# Patient Record
Sex: FEMALE | Race: WHITE | NOT HISPANIC OR LATINO | URBAN - METROPOLITAN AREA
[De-identification: names, ages, dates, MRNs, and addresses within clinical notes are randomized per-mention and may not be internally consistent; named-entity substitution may affect disease eponyms.]

---

## 2018-04-01 PROBLEM — I34.1 MVP (MITRAL VALVE PROLAPSE): Status: ACTIVE | Noted: 2018-04-01

## 2018-04-01 PROBLEM — R91.1 PULMONARY NODULE: Status: ACTIVE | Noted: 2018-04-01

## 2018-04-01 PROBLEM — I77.810 DILATED AORTIC ROOT (CMS/HCC): Status: ACTIVE | Noted: 2018-04-01

## 2018-04-01 PROBLEM — I25.10 CORONARY ARTERY DISEASE INVOLVING NATIVE CORONARY ARTERY OF NATIVE HEART WITHOUT ANGINA PECTORIS: Status: ACTIVE | Noted: 2018-04-01

## 2018-04-01 PROBLEM — I47.10 SVT (SUPRAVENTRICULAR TACHYCARDIA) (CMS/HCC): Status: ACTIVE | Noted: 2018-04-01

## 2018-04-01 PROBLEM — I10 ESSENTIAL HYPERTENSION: Status: ACTIVE | Noted: 2018-04-01

## 2018-04-01 PROBLEM — I45.10 RBBB: Status: ACTIVE | Noted: 2018-04-01

## 2018-04-01 PROBLEM — E78.2 MIXED HYPERLIPIDEMIA: Status: ACTIVE | Noted: 2018-04-01

## 2018-04-06 ENCOUNTER — TELEPHONE (OUTPATIENT)
Dept: CARDIOLOGY | Facility: CLINIC | Age: 83
End: 2018-04-06

## 2018-04-06 RX ORDER — ADHESIVE BANDAGE
30 BANDAGE TOPICAL DAILY PRN
COMMUNITY
Start: 2014-09-29 | End: 2019-10-14

## 2018-04-06 RX ORDER — SOY PROTEIN
1 POWDER (GRAM) ORAL DAILY
COMMUNITY
Start: 2011-06-06

## 2018-04-06 RX ORDER — MAGNESIUM HYDROXIDE 400 MG/5ML
1 SUSPENSION, ORAL (FINAL DOSE FORM) ORAL DAILY
COMMUNITY
Start: 2011-06-06

## 2018-04-06 RX ORDER — THIAMINE HCL 50 MG
50 TABLET ORAL DAILY
COMMUNITY
Start: 2016-09-26

## 2018-04-06 RX ORDER — LORAZEPAM 0.5 MG/1
0.5 TABLET ORAL NIGHTLY PRN
COMMUNITY
Start: 2011-06-06

## 2018-04-06 RX ORDER — SIMETHICONE 125 MG
125 CAPSULE ORAL DAILY
COMMUNITY
Start: 2011-06-06

## 2018-04-06 RX ORDER — CHOLECALCIFEROL (VITAMIN D3) 25 MCG
1 TABLET ORAL DAILY
COMMUNITY
Start: 2011-06-06

## 2018-04-06 RX ORDER — LANSOPRAZOLE 30 MG/1
30 TABLET, ORALLY DISINTEGRATING, DELAYED RELEASE ORAL DAILY
COMMUNITY
Start: 2011-06-06

## 2018-04-06 RX ORDER — FOLIC ACID 0.4 MG
1 TABLET ORAL DAILY
COMMUNITY
Start: 2012-02-06

## 2018-04-06 RX ORDER — FLAXSEED OIL 1000 MG
1 CAPSULE ORAL AS NEEDED
COMMUNITY
Start: 2016-09-26

## 2018-04-06 RX ORDER — VITAMIN E 268 MG
1 CAPSULE ORAL DAILY
COMMUNITY
Start: 2011-06-06 | End: 2022-04-04

## 2018-04-06 NOTE — PROGRESS NOTES
Cardiology Note        Kathy Clayton is a 84 y.o. female 5/24/1933      She has a history of SVT with ablation 2006, of mitral valve prolapse and coronary artery disease.  CAD was identified on calcifications on CAT scan her last ischemic evaluation was negative in September 2016 with pharmacologic nuclear she is pulmonary nodules stable for several years    She has hyperlipidemia but is intolerant to the medications and cannot afford the newer injectable drugs    She has a mildly dilated aortic root last echocardiogram October 2017 was 4.2 she had CT angiogram thoracic aorta done in March 2017 aortic root was only 3.9 nodules are described as stable from 2012    echoCardiogram today stable aortic root 4.2 significant mitral annular calcification with mild mitral regurgitation normal left ventricular systolic function mild aortic insufficiency    Her main complaint is significant arthritis she goes to physical therapy        Patient Active Problem List    Diagnosis Date Noted   • Arthritis 04/09/2018   • Mixed hyperlipidemia 04/01/2018   • Essential hypertension 04/01/2018   • Coronary artery disease involving native coronary artery of native heart without angina pectoris 04/01/2018   • Dilated aortic root (CMS/HCC) ([facility]) 04/01/2018   • SVT (supraventricular tachycardia) (CMS/HCC) ([facility]) 04/01/2018   • Pulmonary nodule 04/01/2018   • RBBB 04/01/2018   • MVP (mitral valve prolapse) 04/01/2018       Allergy    Adenosine; Anesthesia s/i-40  [propofol]; Cephalosporins; Diltiazem; Ezetimibe; Fluoxetine hcl; Metoprolol; Penicillins; Plant stanol leonila; Statins-hmg-coa reductase inhibitors; and Sulfa (sulfonamide antibiotics)          MED LIST       Current Outpatient Prescriptions   Medication Sig Dispense Refill   • cholecalciferol, vitamin D3, (cholecalciferol) 1,000 unit tablet Take 1 tablet by mouth daily.     • CYANOCOBALAMIN/FOLIC ACID (VITAMIN B12-FOLIC ACID) 500-400 mcg tablet Take 1 tablet by mouth daily.      • flaxseed oil 1,000 mg capsule Take 1 capsule by mouth daily.     • folic acid (FOLVITE) 400 mcg tablet Take 1 tablet by mouth daily.     • Lactobac no.41-Bifidobact no.7 (PROBIOTIC-10) 70 mg (3 billion cell) capsule Take 1 capsule by mouth daily.     • lansoprazole (PREVACID SOLUTAB) 30 mg disintegrating tablet Take 30 mg by mouth daily.     • LORazepam (ATIVAN) 0.5 mg tablet Take 0.5 mg by mouth nightly as needed for anxiety.     • magnesium hydroxide (magnesium hydroxide) 400 mg/5 mL suspension Take 30 mL by mouth daily as needed.     • multivit-min-FA-lycopen-lutein (CENTRUM SILVER) tablet Take 1 tablet by mouth daily.     • polyvinyl alcohol-povidon,PF, (REFRESH CLASSIC, PF,) 1.4-0.6 % dropperette Take 1 drop by mouth 4 (four) times a day as needed.     • potassium gluconate 595 mg (99 mg) tablet Take 1 tablet by mouth daily.     • simethicone (GAS RELIEF) 125 mg capsule Take 125 mg by mouth daily.     • thiamine (vitamin B-1) 50 mg tablet Take 50 mg by mouth daily.     • vitamin E 400 unit capsule Take 1 tablet by mouth daily.       No current facility-administered medications for this visit.                 Review of Systems   Constitution: Negative for malaise/fatigue, weight gain and weight loss.   HENT: Negative for hearing loss and hoarse voice.    Eyes: Negative for visual disturbance.   Cardiovascular: Negative for chest pain, claudication, cyanosis, dyspnea on exertion, irregular heartbeat, leg swelling, near-syncope, orthopnea, palpitations, paroxysmal nocturnal dyspnea and syncope.   Respiratory: Negative for cough, hemoptysis, shortness of breath, sleep disturbances due to breathing, snoring, sputum production and wheezing.    Endocrine: Negative for cold intolerance and heat intolerance.   Hematologic/Lymphatic: Negative.  Negative for bleeding problem. Does not bruise/bleed easily.   Skin: Negative.  Negative for rash.   Musculoskeletal: Positive for joint pain. Negative for arthritis, falls,  muscle cramps and myalgias.   Gastrointestinal: Negative for abdominal pain, anorexia, change in bowel habit, constipation, diarrhea, dysphagia, heartburn, jaundice and nausea.   Genitourinary: Negative for frequency and nocturia.   Neurological: Negative for dizziness, focal weakness, headaches, light-headedness, numbness, tremors and vertigo.   Psychiatric/Behavioral: Negative for memory loss. The patient does not have insomnia and is not nervous/anxious.    Allergic/Immunologic: Negative for hives.       Labs     Lab creatinine 0.8 potassium 4 5  Cholesterol 251  HDL 53  Hemoglobin 11.8      No results found for: WBC, HGB, HCT, PLT, CHOL, TRIG, HDL, LDLDIRECT, TOTLDLC, LDLCALC, ALT, AST, NA, K, CL, CREATININE, BUN, CO2, TSH, INR, GLUF, HGBA1C, BNP    No results found for: GLUCOSE, CALCIUM, NA, K, CO2, CL, BUN, CREATININE      Objective   Vitals:    04/09/18 0948   BP: 124/70   Pulse: 68   SpO2: 98%     Physical Exam   Constitutional: She is oriented to person, place, and time. She appears well-developed and well-nourished. No distress.   HENT:   Head: Normocephalic and atraumatic.   Nose: Nose normal.   Eyes: Conjunctivae are normal. No scleral icterus.   Neck: No JVD present.   Cardiovascular: Normal rate, regular rhythm, normal heart sounds and intact distal pulses.  Exam reveals no gallop and no friction rub.    No murmur heard.  2/6 systolic murmur   Pulmonary/Chest: Effort normal. No stridor. No respiratory distress. She has no wheezes. She has no rales. She exhibits no tenderness.   Abdominal: There is no tenderness.   Musculoskeletal: She exhibits no edema or deformity.   Uses a cane   Neurological: She is alert and oriented to person, place, and time.   Skin: Skin is warm and dry.   Psychiatric: She has a normal mood and affect.         Imaging  Calcifications seen on cat scan 2016    pharm nuc neg 9/16    Echo 4/18 Mac mild mr aortic root 4.2 aort sclerosis mild ai no change 2017    CT angiogram  of  thoracic aorta 3.9pulm nodules LLL  3/17 stable from 2012      EKG nsr IRBBB no change      Assessment/Plan:      Coronary artery disease involving native coronary artery of native heart without angina pectoris  This was diagnosed by calcifications seen on CAT scan in the past last ischemic evaluation, pharmacologic nuclear stress test negative in 2016 normal LV systolic function with echo today.  Probably will not  repeat ischemic evaluation unless change in EKG symptoms or if any preoperative evaluation is required she is entertaining the possibility of knee replacement    SVT (supraventricular tachycardia) (CMS/HCC) ([facility])  Ablated 2016 no clinical recurrence no cardiac meds    Mixed hyperlipidemia  Unfixable.  Iwe have gone through all medications and her plan did not cover the PCS canine inhibitors  LDL cholesterol months 160    Dilated aortic root (CMS/HCC) ([facility])  Echo today stable aortic root 4.1 mitral annular calcification mild mitral regurgitation mild aortic regurgitation preserved ejection fraction  CAT scan last year aortic root was 4 pulmonary nodule stable from 2012.  We will repeat noncontrast CAT scan prior to her next visit we will repeat echocardiogram in 1 year    Pulmonary nodule  Left lower lobe pulmonary nodules that have been stable since 2012 she had pulmonary evaluation in the past repeat CAT scan prior to next visit to evaluate her aortic root    MVP (mitral valve prolapse)  Echo today extensive mitral annular calcification with prolapse mild mitral regurgitation mild aortic insufficiency aortic root 4.2 stable    Arthritis  This is her main medical complaints she does go to physical therapy at some point may need knee replacement she is trying to put it off.  If she undergoes orthopedic surgery will repeat pharmacologic nuclear stress test         Her back in 6 months.  She will have lab work noncontrast CAT scan of her aorta prior to that visit, an echocardiogram will be repeated in 1  year.  We will repeat ischemic evaluation unless she has suspicious symptoms change in her EKG or needs a preoperative evaluation    Thank you for allowing me to participate in the care of this patient.  I hope this information is helpful.      This letter was generated using speech recognition software.  Please excuse any typographical errors.  Matias Willard MD [facility]   4/9/2018

## 2018-04-09 ENCOUNTER — OFFICE VISIT (OUTPATIENT)
Dept: CARDIOLOGY | Facility: CLINIC | Age: 83
End: 2018-04-09
Attending: INTERNAL MEDICINE
Payer: MEDICARE

## 2018-04-09 ENCOUNTER — HOSPITAL ENCOUNTER (OUTPATIENT)
Dept: CARDIOLOGY | Facility: CLINIC | Age: 83
Discharge: HOME | End: 2018-04-09
Payer: MEDICARE

## 2018-04-09 VITALS
BODY MASS INDEX: 37.21 KG/M2 | DIASTOLIC BLOOD PRESSURE: 85 MMHG | WEIGHT: 210 LBS | SYSTOLIC BLOOD PRESSURE: 135 MMHG | HEIGHT: 63 IN

## 2018-04-09 VITALS
SYSTOLIC BLOOD PRESSURE: 124 MMHG | BODY MASS INDEX: 34.55 KG/M2 | OXYGEN SATURATION: 98 % | HEART RATE: 68 BPM | DIASTOLIC BLOOD PRESSURE: 70 MMHG | WEIGHT: 195 LBS | HEIGHT: 63 IN

## 2018-04-09 DIAGNOSIS — I25.10 CORONARY ARTERY DISEASE INVOLVING NATIVE CORONARY ARTERY OF NATIVE HEART WITHOUT ANGINA PECTORIS: ICD-10-CM

## 2018-04-09 DIAGNOSIS — Z00.00 ROUTINE GENERAL MEDICAL EXAMINATION AT A HEALTH CARE FACILITY: ICD-10-CM

## 2018-04-09 DIAGNOSIS — I10 ESSENTIAL HYPERTENSION: ICD-10-CM

## 2018-04-09 DIAGNOSIS — I34.1 MVP (MITRAL VALVE PROLAPSE): ICD-10-CM

## 2018-04-09 DIAGNOSIS — R91.1 PULMONARY NODULE: Primary | ICD-10-CM

## 2018-04-09 DIAGNOSIS — I47.10 SVT (SUPRAVENTRICULAR TACHYCARDIA) (CMS/HCC): ICD-10-CM

## 2018-04-09 DIAGNOSIS — I77.810 DILATED AORTIC ROOT (CMS/HCC): ICD-10-CM

## 2018-04-09 DIAGNOSIS — I45.10 RBBB: ICD-10-CM

## 2018-04-09 PROBLEM — M19.90 ARTHRITIS: Status: ACTIVE | Noted: 2018-04-09

## 2018-04-09 LAB
AORTIC ROOT ANNULUS: 4.1 CM
ASCENDING AORTA: 3.7 CM
AV PEAK GRADIENT: 10 MMHG
AV PEAK VELOCITY-S: 1.6 MM/S
AV VALVE AREA: 2.84 CM2
BSA FOR ECHO PROCEDURE: 2.06 M2
CUSP SEPARATION: 1.6 CM
E WAVE DECELERATION TIME: 204 MS
E/A RATIO: 1
E/E' RATIO: 18 MM/S
EF (A4C): 59.9 PERCENT
EF A2C: 68.8 PERCENT
EJECTION FRACTION: 63.9 PERCENT
EST RIGHT VENT SYSTOLIC PRESSURE BY TRICUSPID REGURGITATION JET: 40 MMHG
ESV (BP): 31.2 CM3
INTERVENTRICULAR SEPTUM: 0.9 CM
LA ESV (BP): 80.9 CM3
LA/AORTA RATIO: 0.85
LAAS-AP2: 25.5 CM2
LAAS-AP4: 23.6 CM2
LAD 2D: 3.5 CM
LALD A4C: 5.95 CM
LALD A4C: 6.13 CM
LEFT ATRIUM VOLUME INDEX: 41.26 ML/M2
LEFT ATRIUM VOLUME: 85 ML
LEFT INTERNAL DIMENSION IN SYSTOLE: 2.87 CM (ref 2.7–4.08)
LEFT VENTRICULAR INTERNAL DIMENSION IN DIASTOLE: 4.36 CM (ref 4.57–6.35)
LEFT VENTRICULAR POSTERIOR WALL IN END DIASTOLE: 0.9 CM (ref 0.6–1.11)
LV DIASTOLIC VOLUME: 86.5 CM3
LV ESV (APICAL 2 CHAMBER): 40.6 CM3
LVAD-AP2: 25.5 CM2
LVAD-AP4: 29.9 CM2
LVAS-AP2: 11.9 CM2
LVLD-AP2: 7.49 CM
LVLD-AP4: 7.28 CM
LVLS-AP2: 5.54 CM
LVLS-AP4: 5.97 CM
LVOT 2D: 2.2 CM
LVOT A: 3.8 CM2
LVOT PEAK VELOCITY: 1.2 MM/S
MITRAL VALVE PEAK A WAVE VELOCITY: 1.2 M/S
MV E'TISSUE VEL-LAT: 0.08 M/S
MV E'TISSUE VEL-MED: 0.07 M/S
MV PEAK A VEL: 1.2 MM/S
MV PEAK E VEL: 1.2 MM/S
RAP: 10 MMHG
RVOT VMAX: 852 MM/S
TR MAX PG: 30 MMHG
TRICUSPID VALVE PEAK REGURGITATION VELOCITY: 30 MM/S
Z-SCORE OF LEFT VENTRICULAR DIMENSION IN END DIASTOLE: -2.12
Z-SCORE OF LEFT VENTRICULAR DIMENSION IN END SYSTOLE: -1.15
Z-SCORE OF LEFT VENTRICULAR POSTERIOR WALL IN END DIASTOLE: 0.52

## 2018-04-09 PROCEDURE — 99214 OFFICE O/P EST MOD 30 MIN: CPT | Performed by: INTERNAL MEDICINE

## 2018-04-09 PROCEDURE — 93306 TTE W/DOPPLER COMPLETE: CPT | Performed by: INTERNAL MEDICINE

## 2018-04-09 PROCEDURE — 93000 ELECTROCARDIOGRAM COMPLETE: CPT | Performed by: INTERNAL MEDICINE

## 2018-04-09 ASSESSMENT — ENCOUNTER SYMPTOMS
JAUNDICE: 0
SNORING: 0
NEAR-SYNCOPE: 0
CONSTIPATION: 0
MYALGIAS: 0
MEMORY LOSS: 0
INSOMNIA: 0
FALLS: 0
HEMATOLOGIC/LYMPHATIC NEGATIVE: 1
LIGHT-HEADEDNESS: 0
DYSPNEA ON EXERTION: 0
ORTHOPNEA: 0
WEIGHT LOSS: 0
PND: 0
CLAUDICATION: 0
ANOREXIA: 0
SHORTNESS OF BREATH: 0
HEADACHES: 0
MUSCLE CRAMPS: 0
NERVOUS/ANXIOUS: 0
WEIGHT GAIN: 0
COUGH: 0
DIZZINESS: 0
CHANGE IN BOWEL HABIT: 0
ABDOMINAL PAIN: 0
HEARTBURN: 0
BRUISES/BLEEDS EASILY: 0
HOARSE VOICE: 0
FREQUENCY: 0
IRREGULAR HEARTBEAT: 0
NAUSEA: 0
SPUTUM PRODUCTION: 0
WHEEZING: 0
FOCAL WEAKNESS: 0
SYNCOPE: 0
PALPITATIONS: 0
VERTIGO: 0
DIARRHEA: 0
SLEEP DISTURBANCES DUE TO BREATHING: 0
HEMOPTYSIS: 0
TREMORS: 0
NUMBNESS: 0

## 2018-04-09 NOTE — ASSESSMENT & PLAN NOTE
Echo today stable aortic root 4.1 mitral annular calcification mild mitral regurgitation mild aortic regurgitation preserved ejection fraction  CAT scan last year aortic root was 4 pulmonary nodule stable from 2012.  We will repeat noncontrast CAT scan prior to her next visit we will repeat echocardiogram in 1 year

## 2018-04-09 NOTE — ASSESSMENT & PLAN NOTE
Echo today extensive mitral annular calcification with prolapse mild mitral regurgitation mild aortic insufficiency aortic root 4.2 stable

## 2018-04-09 NOTE — ASSESSMENT & PLAN NOTE
This was diagnosed by calcifications seen on CAT scan in the past last ischemic evaluation, pharmacologic nuclear stress test negative in 2016 normal LV systolic function with echo today.  Probably will not  repeat ischemic evaluation unless change in EKG symptoms or if any preoperative evaluation is required she is entertaining the possibility of knee replacement

## 2018-04-09 NOTE — LETTER
April 9, 2018     Arpan Casillas  [address]    [facility] [id number]    Patient: Kathy Clayton   YOB: 1933   Date of Visit: 4/9/2018       Dear Dr. Casillas:    Thank you for referring Kathy Clayton to me for evaluation. Below are my notes for this consultation.    If you have questions, please do not hesitate to call me. I look forward to following your patient along with you.         Sincerely,        Matias Willard MD        CC: No Recipients  Matias Willard MD  4/9/2018 10:15 AM  Sign at close encounter      Cardiology Note        Kathy Clayton is a 84 y.o. female 5/24/1933      She has a history of SVT with ablation 2006, of mitral valve prolapse and coronary artery disease.  CAD was identified on calcifications on CAT scan her last ischemic evaluation was negative in September 2016 with pharmacologic nuclear she is pulmonary nodules stable for several years    She has hyperlipidemia but is intolerant to the medications and cannot afford the newer injectable drugs    She has a mildly dilated aortic root last echocardiogram October 2017 was 4.2 she had CT angiogram thoracic aorta done in March 2017 aortic root was only 3.9 nodules are described as stable from 2012    echoCardiogram today stable aortic root 4.2 significant mitral annular calcification with mild mitral regurgitation normal left ventricular systolic function mild aortic insufficiency    Her main complaint is significant arthritis she goes to physical therapy        Patient Active Problem List    Diagnosis Date Noted   • Arthritis 04/09/2018   • Mixed hyperlipidemia 04/01/2018   • Essential hypertension 04/01/2018   • Coronary artery disease involving native coronary artery of native heart without angina pectoris 04/01/2018   • Dilated aortic root (CMS/HCC) ([facility]) 04/01/2018   • SVT (supraventricular tachycardia) (CMS/HCC) ([facility]) 04/01/2018   • Pulmonary nodule 04/01/2018   • RBBB 04/01/2018   • MVP  (mitral valve prolapse) 04/01/2018       Allergy    Adenosine; Anesthesia s/i-40  [propofol]; Cephalosporins; Diltiazem; Ezetimibe; Fluoxetine hcl; Metoprolol; Penicillins; Plant stanol leonila; Statins-hmg-coa reductase inhibitors; and Sulfa (sulfonamide antibiotics)          MED LIST       Current Outpatient Prescriptions   Medication Sig Dispense Refill   • cholecalciferol, vitamin D3, (cholecalciferol) 1,000 unit tablet Take 1 tablet by mouth daily.     • CYANOCOBALAMIN/FOLIC ACID (VITAMIN B12-FOLIC ACID) 500-400 mcg tablet Take 1 tablet by mouth daily.     • flaxseed oil 1,000 mg capsule Take 1 capsule by mouth daily.     • folic acid (FOLVITE) 400 mcg tablet Take 1 tablet by mouth daily.     • Lactobac no.41-Bifidobact no.7 (PROBIOTIC-10) 70 mg (3 billion cell) capsule Take 1 capsule by mouth daily.     • lansoprazole (PREVACID SOLUTAB) 30 mg disintegrating tablet Take 30 mg by mouth daily.     • LORazepam (ATIVAN) 0.5 mg tablet Take 0.5 mg by mouth nightly as needed for anxiety.     • magnesium hydroxide (magnesium hydroxide) 400 mg/5 mL suspension Take 30 mL by mouth daily as needed.     • multivit-min-FA-lycopen-lutein (CENTRUM SILVER) tablet Take 1 tablet by mouth daily.     • polyvinyl alcohol-povidon,PF, (REFRESH CLASSIC, PF,) 1.4-0.6 % dropperette Take 1 drop by mouth 4 (four) times a day as needed.     • potassium gluconate 595 mg (99 mg) tablet Take 1 tablet by mouth daily.     • simethicone (GAS RELIEF) 125 mg capsule Take 125 mg by mouth daily.     • thiamine (vitamin B-1) 50 mg tablet Take 50 mg by mouth daily.     • vitamin E 400 unit capsule Take 1 tablet by mouth daily.       No current facility-administered medications for this visit.                 Review of Systems   Constitution: Negative for malaise/fatigue, weight gain and weight loss.   HENT: Negative for hearing loss and hoarse voice.    Eyes: Negative for visual disturbance.   Cardiovascular: Negative for chest pain, claudication,  cyanosis, dyspnea on exertion, irregular heartbeat, leg swelling, near-syncope, orthopnea, palpitations, paroxysmal nocturnal dyspnea and syncope.   Respiratory: Negative for cough, hemoptysis, shortness of breath, sleep disturbances due to breathing, snoring, sputum production and wheezing.    Endocrine: Negative for cold intolerance and heat intolerance.   Hematologic/Lymphatic: Negative.  Negative for bleeding problem. Does not bruise/bleed easily.   Skin: Negative.  Negative for rash.   Musculoskeletal: Positive for joint pain. Negative for arthritis, falls, muscle cramps and myalgias.   Gastrointestinal: Negative for abdominal pain, anorexia, change in bowel habit, constipation, diarrhea, dysphagia, heartburn, jaundice and nausea.   Genitourinary: Negative for frequency and nocturia.   Neurological: Negative for dizziness, focal weakness, headaches, light-headedness, numbness, tremors and vertigo.   Psychiatric/Behavioral: Negative for memory loss. The patient does not have insomnia and is not nervous/anxious.    Allergic/Immunologic: Negative for hives.       Labs     Lab creatinine 0.8 potassium 4 5  Cholesterol 251  HDL 53  Hemoglobin 11.8      No results found for: WBC, HGB, HCT, PLT, CHOL, TRIG, HDL, LDLDIRECT, TOTLDLC, LDLCALC, ALT, AST, NA, K, CL, CREATININE, BUN, CO2, TSH, INR, GLUF, HGBA1C, BNP    No results found for: GLUCOSE, CALCIUM, NA, K, CO2, CL, BUN, CREATININE      Objective   Vitals:    04/09/18 0948   BP: 124/70   Pulse: 68   SpO2: 98%     Physical Exam   Constitutional: She is oriented to person, place, and time. She appears well-developed and well-nourished. No distress.   HENT:   Head: Normocephalic and atraumatic.   Nose: Nose normal.   Eyes: Conjunctivae are normal. No scleral icterus.   Neck: No JVD present.   Cardiovascular: Normal rate, regular rhythm, normal heart sounds and intact distal pulses.  Exam reveals no gallop and no friction rub.    No murmur heard.  2/6 systolic  murmur   Pulmonary/Chest: Effort normal. No stridor. No respiratory distress. She has no wheezes. She has no rales. She exhibits no tenderness.   Abdominal: There is no tenderness.   Musculoskeletal: She exhibits no edema or deformity.   Uses a cane   Neurological: She is alert and oriented to person, place, and time.   Skin: Skin is warm and dry.   Psychiatric: She has a normal mood and affect.         Imaging  Calcifications seen on cat scan 2016    pharm nuc neg 9/16    Echo 4/18 Mac mild mr aortic root 4.2 aort sclerosis mild ai no change 2017    CT angiogram of  thoracic aorta 3.9pulm nodules LLL  3/17 stable from 2012      EKG nsr IRBBB no change      Assessment/Plan:      Coronary artery disease involving native coronary artery of native heart without angina pectoris  This was diagnosed by calcifications seen on CAT scan in the past last ischemic evaluation, pharmacologic nuclear stress test negative in 2016 normal LV systolic function with echo today.  Probably will not  repeat ischemic evaluation unless change in EKG symptoms or if any preoperative evaluation is required she is entertaining the possibility of knee replacement    SVT (supraventricular tachycardia) (CMS/HCC) (HCC)  Ablated 2016 no clinical recurrence no cardiac meds    Mixed hyperlipidemia  Unfixable.  Iwe have gone through all medications and her plan did not cover the PCS canine inhibitors  LDL cholesterol months 160    Dilated aortic root (CMS/HCC) (HCC)  Echo today stable aortic root 4.1 mitral annular calcification mild mitral regurgitation mild aortic regurgitation preserved ejection fraction  CAT scan last year aortic root was 4 pulmonary nodule stable from 2012.  We will repeat noncontrast CAT scan prior to her next visit we will repeat echocardiogram in 1 year    Pulmonary nodule  Left lower lobe pulmonary nodules that have been stable since 2012 she had pulmonary evaluation in the past repeat CAT scan prior to next visit to evaluate  her aortic root    MVP (mitral valve prolapse)  Echo today extensive mitral annular calcification with prolapse mild mitral regurgitation mild aortic insufficiency aortic root 4.2 stable    Arthritis  This is her main medical complaints she does go to physical therapy at some point may need knee replacement she is trying to put it off.  If she undergoes orthopedic surgery will repeat pharmacologic nuclear stress test         Her back in 6 months.  She will have lab work noncontrast CAT scan of her aorta prior to that visit, an echocardiogram will be repeated in 1 year.  We will repeat ischemic evaluation unless she has suspicious symptoms change in her EKG or needs a preoperative evaluation    Thank you for allowing me to participate in the care of this patient.  I hope this information is helpful.      This letter was generated using speech recognition software.  Please excuse any typographical errors.  Matias Willard MD [facility]   4/9/2018

## 2018-04-09 NOTE — ASSESSMENT & PLAN NOTE
Unfixable.  Iwe have gone through all medications and her plan did not cover the PCS canine inhibitors  LDL cholesterol months 160

## 2018-04-09 NOTE — ASSESSMENT & PLAN NOTE
This is her main medical complaints she does go to physical therapy at some point may need knee replacement she is trying to put it off.  If she undergoes orthopedic surgery will repeat pharmacologic nuclear stress test

## 2018-09-28 DIAGNOSIS — I71.20 THORACIC AORTIC ANEURYSM, WITHOUT RUPTURE: Primary | ICD-10-CM

## 2018-10-02 NOTE — PROGRESS NOTES
Cardiology Note    Arpan Casillas Forrest is a 85 y.o. female 5/24/1933     She Returns for cardiac follow-up after noncontrast CAT scan for follow-up of dilated aortic root    CAT scan done earlier this month stable aortic root 4 thyroid nodule pulmonary nodule unchanged for several years  He has chronic GI issues and complained of an episode of GERD yesterday and lasted for hours no exertional component  She has coronary artery disease based on calcification seen on CAT scan last ischemic evaluation, pharmacologic nuclear stress test -2016  I told her if chest pain becomes a frequent issue or changes in quality we will repeat ischemic evaluation  She has mitral valve prolapse with mild mitral regurgitation mild aortic insufficiency and dilated aortic root as discussed above last echo April 2018  She had SVT ablated 2016    She had gerd yestersy lasts all day hx of gerd  Resolved ekg  No acute changes  If recurs to consider repeat pharm nuc hold off for now    She had some flashing in her eyes to she is coming back later this week as per ophthalmologic recommendation, for carotid duplex      Patient Active Problem List    Diagnosis Date Noted   • Visual changes 10/08/2018   • Arthritis 04/09/2018   • Mixed hyperlipidemia 04/01/2018   • Essential hypertension 04/01/2018   • Coronary artery disease involving native coronary artery of native heart without angina pectoris 04/01/2018   • Dilated aortic root (CMS/HCC) (HCC) 04/01/2018   • SVT (supraventricular tachycardia) (CMS/HCC) ([facility]) 04/01/2018   • Pulmonary nodule 04/01/2018   • RBBB 04/01/2018   • MVP (mitral valve prolapse) 04/01/2018       Allergy    Adenosine; Anesthesia s/i-40  [propofol]; Cephalosporins; Diltiazem; Ezetimibe; Fluoxetine hcl; Metoprolol; Penicillins; Plant stanol leonila; Statins-hmg-coa reductase inhibitors; and Sulfa (sulfonamide antibiotics)          MED LIST       Current Outpatient Prescriptions   Medication Sig  Dispense Refill   • cholecalciferol, vitamin D3, (cholecalciferol) 1,000 unit tablet Take 1 tablet by mouth daily.     • CYANOCOBALAMIN/FOLIC ACID (VITAMIN B12-FOLIC ACID) 500-400 mcg tablet Take 1 tablet by mouth daily.     • flaxseed oil 1,000 mg capsule Take 1 capsule by mouth daily.     • folic acid (FOLVITE) 400 mcg tablet Take 1 tablet by mouth daily.     • Lactobac no.41-Bifidobact no.7 (PROBIOTIC-10) 70 mg (3 billion cell) capsule Take 1 capsule by mouth daily.     • lansoprazole (PREVACID SOLUTAB) 30 mg disintegrating tablet Take 30 mg by mouth daily.     • LORazepam (ATIVAN) 0.5 mg tablet Take 0.5 mg by mouth nightly as needed for anxiety.     • magnesium hydroxide (magnesium hydroxide) 400 mg/5 mL suspension Take 30 mL by mouth daily as needed.     • multivit-min-FA-lycopen-lutein (CENTRUM SILVER) tablet Take 1 tablet by mouth daily.     • polyvinyl alcohol-povidon,PF, (REFRESH CLASSIC, PF,) 1.4-0.6 % dropperette Take 1 drop by mouth 4 (four) times a day as needed.     • potassium gluconate 595 mg (99 mg) tablet Take 1 tablet by mouth daily.     • simethicone (GAS RELIEF) 125 mg capsule Take 125 mg by mouth daily.     • thiamine (vitamin B-1) 50 mg tablet Take 50 mg by mouth daily.     • vitamin E 400 unit capsule Take 1 tablet by mouth daily.       No current facility-administered medications for this visit.                 Review of Systems   Constitution: Negative for malaise/fatigue, weight gain and weight loss.   HENT: Negative for hearing loss and hoarse voice.    Eyes: Negative for visual disturbance.   Cardiovascular: Negative for chest pain, claudication, cyanosis, dyspnea on exertion, irregular heartbeat, leg swelling, near-syncope, orthopnea, palpitations, paroxysmal nocturnal dyspnea and syncope.   Respiratory: Negative for cough, hemoptysis, shortness of breath, sleep disturbances due to breathing, snoring, sputum production and wheezing.    Endocrine: Negative for cold intolerance and heat  "intolerance.   Hematologic/Lymphatic: Negative.  Negative for bleeding problem. Does not bruise/bleed easily.   Skin: Negative.  Negative for rash.   Musculoskeletal: Positive for joint pain. Negative for arthritis, falls, muscle cramps and myalgias.   Gastrointestinal: Positive for heartburn. Negative for abdominal pain, anorexia, change in bowel habit, constipation, diarrhea, dysphagia, jaundice and nausea.   Genitourinary: Negative for frequency and nocturia.   Neurological: Negative for dizziness, focal weakness, headaches, light-headedness, numbness, tremors and vertigo.   Psychiatric/Behavioral: Negative for memory loss. The patient does not have insomnia and is not nervous/anxious.    Allergic/Immunologic: Negative for hives.       Labs   No results found for: WBC, HGB, HCT, PLT, CHOL, TRIG, HDL, LDLDIRECT, TOTLDLC, LDLCALC, ALT, AST, NA, K, CL, CREATININE, BUN, CO2, TSH, INR, HGBA1C, BNP    No results found for: GLUCOSE, CALCIUM, NA, K, CO2, CL, BUN, CREATININE      Objective   Vitals:    10/08/18 0830   BP: 134/82   BP Location: Left upper arm   Patient Position: Sitting   Pulse: 71   SpO2: 94%   Weight: 95.8 kg (211 lb 3.2 oz)   Height: 1.6 m (5' 3\")     Physical Exam   Constitutional: She is oriented to person, place, and time. She appears well-developed and well-nourished. No distress.   HENT:   Head: Normocephalic and atraumatic.   Nose: Nose normal.   Eyes: Conjunctivae are normal. No scleral icterus.   Neck: No JVD present.   Cardiovascular: Normal rate, regular rhythm, normal heart sounds and intact distal pulses.  Exam reveals no gallop and no friction rub.    No murmur heard.  2/6 systolic murmur   Pulmonary/Chest: Effort normal. No stridor. No respiratory distress. She has no wheezes. She has no rales. She exhibits no tenderness.   Abdominal: There is no tenderness.   Musculoskeletal: She exhibits no edema or deformity.   Uses a cane   Neurological: She is alert and oriented to person, place, and " time.   Skin: Skin is warm and dry.   Psychiatric: She has a normal mood and affect.         Cardiac Procedures    Imaging  CAT SCAN    CTA 3/17 AOR ROOT 5.0 PULM NODULES NO [id number]    CTA 3/17 aorga 3.9 lll pulm nodules stable from 201`12    CAT scan October 2018 aortic root 4  Thyroid nodule  Right upper lobe tubular nodule  No change 2016    STRESS  pharm nuc neg 9/16       ECHO  Echo 4/18 Mac mild mr aortic root 4.2 aort sclerosis mild ai no change 2017          EKG non spec st t top normla qt interval        assessment/Plan:    She had some unusual flashes in her eyes she will have carotid duplex done later this week and follows up with Dr. Lennie Cordero  I asked her to call me for the test to go over the results  I will see her back in 6 months with resting echocardiogram and lab work  She had a long issue with GERD symptoms return or change in quality we will repeat ischemic evaluation sounds like her typical indigestion  Due for noncontrast CAT scan of her aorta follow-up on aortic root, in 1 year                  Dilated aortic root (CMS/HCC) (HCC)  CAT scan stable for years subcentimeter pulmonary nodule stable for years CAT scan showed aortic root 4.0    Coronary artery disease involving native coronary artery of native heart without angina pectoris  This was diagnosed by calcifications seen on CAT scan in the past last stress test pharmacologic nuclear stress test -2016  Gets constant bouts of indigestion no real change will hold off on repeat testing no change in EKG or change in quality of chest pain    MVP (mitral valve prolapse)  Last seen on echo October 2017 MAC mild MR aortic root 4.2 stable  Repeat echo with next visit    SVT (supraventricular tachycardia) (CMS/HCC) (HCC)  Ablated 2006    Pulmonary nodule  Left lower lobe stable since 2012  Seen on recent CAT scan for follow-up of mildly dilated aortic root    Mixed hyperlipidemia  Cannot tolerate any therapies her insurance plan did not  cover the injectable PCSK9 monoclonal antibodies    Visual changes  She was having some flashing lights in her eyes was evaluated by Dr. Lennie Cordero  She will be having duplex of her carotids done in the next week    She is having carotid duplex in the next week I asked her to call me to go over the results   I will see her back in 6 months with echo and lab work she knows if there is any change in the quality of her chest pain let me know and I will repeat ischemic evaluation    Re                          Matias Willard MD [facility]   10/8/2018  Arpan Sethi

## 2018-10-03 ENCOUNTER — TELEPHONE (OUTPATIENT)
Dept: CARDIOLOGY | Facility: CLINIC | Age: 83
End: 2018-10-03

## 2018-10-03 NOTE — TELEPHONE ENCOUNTER
LMOM regarding cat scan result of her aorta is stable. Informed pt to call back with further questions if needed.

## 2018-10-05 ENCOUNTER — TRANSCRIBE ORDERS (OUTPATIENT)
Dept: CARDIOLOGY | Facility: CLINIC | Age: 83
End: 2018-10-05

## 2018-10-05 DIAGNOSIS — R09.89 CAROTID ARTERY BRUIT: Primary | ICD-10-CM

## 2018-10-08 ENCOUNTER — OFFICE VISIT (OUTPATIENT)
Dept: CARDIOLOGY | Facility: CLINIC | Age: 83
End: 2018-10-08
Payer: MEDICARE

## 2018-10-08 VITALS
BODY MASS INDEX: 37.42 KG/M2 | WEIGHT: 211.2 LBS | OXYGEN SATURATION: 94 % | HEART RATE: 71 BPM | HEIGHT: 63 IN | SYSTOLIC BLOOD PRESSURE: 134 MMHG | DIASTOLIC BLOOD PRESSURE: 82 MMHG

## 2018-10-08 DIAGNOSIS — I77.810 DILATED AORTIC ROOT (CMS/HCC): ICD-10-CM

## 2018-10-08 DIAGNOSIS — I10 ESSENTIAL HYPERTENSION: Primary | ICD-10-CM

## 2018-10-08 DIAGNOSIS — I25.10 CORONARY ARTERY DISEASE INVOLVING NATIVE CORONARY ARTERY OF NATIVE HEART WITHOUT ANGINA PECTORIS: ICD-10-CM

## 2018-10-08 DIAGNOSIS — I47.10 SVT (SUPRAVENTRICULAR TACHYCARDIA) (CMS/HCC): ICD-10-CM

## 2018-10-08 PROBLEM — H53.9 VISUAL CHANGES: Status: ACTIVE | Noted: 2018-10-08

## 2018-10-08 PROCEDURE — 99214 OFFICE O/P EST MOD 30 MIN: CPT | Performed by: INTERNAL MEDICINE

## 2018-10-08 PROCEDURE — 93000 ELECTROCARDIOGRAM COMPLETE: CPT | Performed by: INTERNAL MEDICINE

## 2018-10-08 ASSESSMENT — ENCOUNTER SYMPTOMS
SPUTUM PRODUCTION: 0
COUGH: 0
SHORTNESS OF BREATH: 0
HEMOPTYSIS: 0
PALPITATIONS: 0
HEADACHES: 0
CLAUDICATION: 0
WEIGHT GAIN: 0
CHANGE IN BOWEL HABIT: 0
DIZZINESS: 0
NERVOUS/ANXIOUS: 0
FALLS: 0
NEAR-SYNCOPE: 0
HEMATOLOGIC/LYMPHATIC NEGATIVE: 1
WEIGHT LOSS: 0
ABDOMINAL PAIN: 0
LIGHT-HEADEDNESS: 0
SLEEP DISTURBANCES DUE TO BREATHING: 0
TREMORS: 0
NAUSEA: 0
JAUNDICE: 0
MEMORY LOSS: 0
FREQUENCY: 0
ORTHOPNEA: 0
DIARRHEA: 0
BRUISES/BLEEDS EASILY: 0
SYNCOPE: 0
FOCAL WEAKNESS: 0
MUSCLE CRAMPS: 0
MYALGIAS: 0
HOARSE VOICE: 0
ANOREXIA: 0
HEARTBURN: 1
NUMBNESS: 0
DYSPNEA ON EXERTION: 0
WHEEZING: 0
INSOMNIA: 0
PND: 0
SNORING: 0
IRREGULAR HEARTBEAT: 0
CONSTIPATION: 0
VERTIGO: 0

## 2018-10-08 NOTE — ASSESSMENT & PLAN NOTE
This was diagnosed by calcifications seen on CAT scan in the past last stress test pharmacologic nuclear stress test -2016  Gets constant bouts of indigestion no real change will hold off on repeat testing no change in EKG or change in quality of chest pain

## 2018-10-08 NOTE — ASSESSMENT & PLAN NOTE
She was having some flashing lights in her eyes was evaluated by Dr. Lennie Cordero  She will be having duplex of her carotids done in the next week

## 2018-10-08 NOTE — ASSESSMENT & PLAN NOTE
Left lower lobe stable since 2012  Seen on recent CAT scan for follow-up of mildly dilated aortic root

## 2018-10-08 NOTE — LETTER
October 8, 2018     Arpan Casillas  [address]    [place] [id number]    Patient: Kathy Clayton   YOB: 1933   Date of Visit: 10/8/2018       Dear Tino      Thank you for referring Kathy Clayton to me for evaluation. Below are my notes for this consultation.    If you have questions, please do not hesitate to call me. I look forward to following your patient along with you.         Sincerely,        Matias Willard MD        CC: MD Sudhere Rosenbaum, Matias LIND MD  10/8/2018 10:25 AM  Sign at close encounter      Cardiology Note    Arpan Casillas is a 85 y.o. female 5/24/1933     She Returns for cardiac follow-up after noncontrast CAT scan for follow-up of dilated aortic root    CAT scan done earlier this month stable aortic root 4 thyroid nodule pulmonary nodule unchanged for several years  He has chronic GI issues and complained of an episode of GERD yesterday and lasted for hours no exertional component  She has coronary artery disease based on calcification seen on CAT scan last ischemic evaluation, pharmacologic nuclear stress test -2016  I told her if chest pain becomes a frequent issue or changes in quality we will repeat ischemic evaluation  She has mitral valve prolapse with mild mitral regurgitation mild aortic insufficiency and dilated aortic root as discussed above last echo April 2018  She had SVT ablated 2016    She had gerd yestersy lasts all day hx of gerd  Resolved ekg  No acute changes  If recurs to consider repeat pharm nuc hold off for now    She had some flashing in her eyes to she is coming back later this week as per ophthalmologic recommendation, for carotid duplex      Patient Active Problem List    Diagnosis Date Noted   • Visual changes 10/08/2018   • Arthritis 04/09/2018   • Mixed hyperlipidemia 04/01/2018   • Essential hypertension 04/01/2018   • Coronary artery disease involving native coronary artery of native  heart without angina pectoris 04/01/2018   • Dilated aortic root (CMS/HCC) ([facility]) 04/01/2018   • SVT (supraventricular tachycardia) (CMS/HCC) ([facility]) 04/01/2018   • Pulmonary nodule 04/01/2018   • RBBB 04/01/2018   • MVP (mitral valve prolapse) 04/01/2018       Allergy    Adenosine; Anesthesia s/i-40  [propofol]; Cephalosporins; Diltiazem; Ezetimibe; Fluoxetine hcl; Metoprolol; Penicillins; Plant stanol leonila; Statins-hmg-coa reductase inhibitors; and Sulfa (sulfonamide antibiotics)          MED LIST       Current Outpatient Prescriptions   Medication Sig Dispense Refill   • cholecalciferol, vitamin D3, (cholecalciferol) 1,000 unit tablet Take 1 tablet by mouth daily.     • CYANOCOBALAMIN/FOLIC ACID (VITAMIN B12-FOLIC ACID) 500-400 mcg tablet Take 1 tablet by mouth daily.     • flaxseed oil 1,000 mg capsule Take 1 capsule by mouth daily.     • folic acid (FOLVITE) 400 mcg tablet Take 1 tablet by mouth daily.     • Lactobac no.41-Bifidobact no.7 (PROBIOTIC-10) 70 mg (3 billion cell) capsule Take 1 capsule by mouth daily.     • lansoprazole (PREVACID SOLUTAB) 30 mg disintegrating tablet Take 30 mg by mouth daily.     • LORazepam (ATIVAN) 0.5 mg tablet Take 0.5 mg by mouth nightly as needed for anxiety.     • magnesium hydroxide (magnesium hydroxide) 400 mg/5 mL suspension Take 30 mL by mouth daily as needed.     • multivit-min-FA-lycopen-lutein (CENTRUM SILVER) tablet Take 1 tablet by mouth daily.     • polyvinyl alcohol-povidon,PF, (REFRESH CLASSIC, PF,) 1.4-0.6 % dropperette Take 1 drop by mouth 4 (four) times a day as needed.     • potassium gluconate 595 mg (99 mg) tablet Take 1 tablet by mouth daily.     • simethicone (GAS RELIEF) 125 mg capsule Take 125 mg by mouth daily.     • thiamine (vitamin B-1) 50 mg tablet Take 50 mg by mouth daily.     • vitamin E 400 unit capsule Take 1 tablet by mouth daily.       No current facility-administered medications for this visit.                 Review of Systems  "  Constitution: Negative for malaise/fatigue, weight gain and weight loss.   HENT: Negative for hearing loss and hoarse voice.    Eyes: Negative for visual disturbance.   Cardiovascular: Negative for chest pain, claudication, cyanosis, dyspnea on exertion, irregular heartbeat, leg swelling, near-syncope, orthopnea, palpitations, paroxysmal nocturnal dyspnea and syncope.   Respiratory: Negative for cough, hemoptysis, shortness of breath, sleep disturbances due to breathing, snoring, sputum production and wheezing.    Endocrine: Negative for cold intolerance and heat intolerance.   Hematologic/Lymphatic: Negative.  Negative for bleeding problem. Does not bruise/bleed easily.   Skin: Negative.  Negative for rash.   Musculoskeletal: Positive for joint pain. Negative for arthritis, falls, muscle cramps and myalgias.   Gastrointestinal: Positive for heartburn. Negative for abdominal pain, anorexia, change in bowel habit, constipation, diarrhea, dysphagia, jaundice and nausea.   Genitourinary: Negative for frequency and nocturia.   Neurological: Negative for dizziness, focal weakness, headaches, light-headedness, numbness, tremors and vertigo.   Psychiatric/Behavioral: Negative for memory loss. The patient does not have insomnia and is not nervous/anxious.    Allergic/Immunologic: Negative for hives.       Labs   No results found for: WBC, HGB, HCT, PLT, CHOL, TRIG, HDL, LDLDIRECT, TOTLDLC, LDLCALC, ALT, AST, NA, K, CL, CREATININE, BUN, CO2, TSH, INR, HGBA1C, BNP    No results found for: GLUCOSE, CALCIUM, NA, K, CO2, CL, BUN, CREATININE      Objective   Vitals:    10/08/18 0830   BP: 134/82   BP Location: Left upper arm   Patient Position: Sitting   Pulse: 71   SpO2: 94%   Weight: 95.8 kg (211 lb 3.2 oz)   Height: 1.6 m (5' 3\")     Physical Exam   Constitutional: She is oriented to person, place, and time. She appears well-developed and well-nourished. No distress.   HENT:   Head: Normocephalic and atraumatic.   Nose: Nose " normal.   Eyes: Conjunctivae are normal. No scleral icterus.   Neck: No JVD present.   Cardiovascular: Normal rate, regular rhythm, normal heart sounds and intact distal pulses.  Exam reveals no gallop and no friction rub.    No murmur heard.  2/6 systolic murmur   Pulmonary/Chest: Effort normal. No stridor. No respiratory distress. She has no wheezes. She has no rales. She exhibits no tenderness.   Abdominal: There is no tenderness.   Musculoskeletal: She exhibits no edema or deformity.   Uses a cane   Neurological: She is alert and oriented to person, place, and time.   Skin: Skin is warm and dry.   Psychiatric: She has a normal mood and affect.         Cardiac Procedures    Imaging  CAT SCAN    CTA 3/17 AOR ROOT 5.0 PULM NODULES NO [id number]    CTA 3/17 aorga 3.9 lll pulm nodules stable from 201`12    CAT scan October 2018 aortic root 4  Thyroid nodule  Right upper lobe tubular nodule  No change 2016    STRESS  pharm nuc neg 9/16       ECHO  Echo 4/18 Mac mild mr aortic root 4.2 aort sclerosis mild ai no change 2017          EKG non spec st t top normla qt interval        assessment/Plan:    She had some unusual flashes in her eyes she will have carotid duplex done later this week and follows up with Dr. Lennie Cordero  I asked her to call me for the test to go over the results  I will see her back in 6 months with resting echocardiogram and lab work  She had a long issue with GERD symptoms return or change in quality we will repeat ischemic evaluation sounds like her typical indigestion  Due for noncontrast CAT scan of her aorta follow-up on aortic root, in 1 year                  Dilated aortic root (CMS/HCC) (HCC)  CAT scan stable for years subcentimeter pulmonary nodule stable for years CAT scan showed aortic root 4.0    Coronary artery disease involving native coronary artery of native heart without angina pectoris  This was diagnosed by calcifications seen on CAT scan in the past last stress test  pharmacologic nuclear stress test -2016  Gets constant bouts of indigestion no real change will hold off on repeat testing no change in EKG or change in quality of chest pain    MVP (mitral valve prolapse)  Last seen on echo October 2017 MAC mild MR aortic root 4.2 stable  Repeat echo with next visit    SVT (supraventricular tachycardia) (CMS/HCC) (HCC)  Ablated 2006    Pulmonary nodule  Left lower lobe stable since 2012  Seen on recent CAT scan for follow-up of mildly dilated aortic root    Mixed hyperlipidemia  Cannot tolerate any therapies her insurance plan did not cover the injectable PCSK9 monoclonal antibodies    Visual changes  She was having some flashing lights in her eyes was evaluated by Dr. Lennie Cordero  She will be having duplex of her carotids done in the next week    She is having carotid duplex in the next week I asked her to call me to go over the results   I will see her back in 6 months with echo and lab work she knows if there is any change in the quality of her chest pain let me know and I will repeat ischemic evaluation    Re                          Matias Willard MD [facility]   10/8/2018  Arpan Sethi

## 2018-10-08 NOTE — ASSESSMENT & PLAN NOTE
Cannot tolerate any therapies her insurance plan did not cover the injectable PCSK9 monoclonal antibodies

## 2018-10-08 NOTE — ASSESSMENT & PLAN NOTE
CAT scan stable for years subcentimeter pulmonary nodule stable for years CAT scan showed aortic root 4.0

## 2018-10-10 ENCOUNTER — HOSPITAL ENCOUNTER (OUTPATIENT)
Dept: CARDIOLOGY | Facility: CLINIC | Age: 83
Discharge: HOME | End: 2018-10-10
Payer: MEDICARE

## 2018-10-10 VITALS
WEIGHT: 211 LBS | HEIGHT: 63 IN | SYSTOLIC BLOOD PRESSURE: 120 MMHG | BODY MASS INDEX: 37.39 KG/M2 | DIASTOLIC BLOOD PRESSURE: 80 MMHG

## 2018-10-10 DIAGNOSIS — R09.89 CAROTID ARTERY BRUIT: ICD-10-CM

## 2018-10-10 LAB
BSA FOR ECHO PROCEDURE: 2.06 M2
LEFT CCA DIST DIAS: 15.8 CM/S
LEFT CCA DIST SYS: 57.1 CM/S
LEFT CCA MID DIAS: 14.5 CM/S
LEFT CCA MID SYS: 59.3 CM/S
LEFT CCA PROX DIAS: 16.2 CM/S
LEFT CCA PROX SYS: 67.6 CM/S
LEFT ICA DIST DIAS: 28.5 CM/S
LEFT ICA DIST SYS: 76.8 CM/S
LEFT ICA MID DIAS: 25.9 CM/S
LEFT ICA MID SYS: 73.8 CM/S
LEFT ICA PROX DIAS: 10.5 CM/S
LEFT ICA PROX SYS: 36.4 CM/S
LEFT ICA/CCA SYS: 1.35
LT BULB EDV: 14.9 CM/S
LT BULB PSV: 60.1 CM/S
LT ECA PROX EDV: 13.6 CM/S
LT ECA PROX PSV: 82.5 CM/S
LT VERTEBRAL MID EDV: 18.4 CM/S
LT VERTEBRAL MID PSV: 53.1 CM/S
RIGHT CCA DIST DIAS: 23.6 CM/S
RIGHT CCA DIST SYS: 72.1 CM/S
RIGHT CCA MID DIAS: 23 CM/S
RIGHT CCA MID SYS: 70.8 CM/S
RIGHT CCA PROX DIAS: 21.1 CM/S
RIGHT CCA PROX SYS: 73.3 CM/S
RIGHT ECA SYS: 90.9 CM/S
RIGHT ICA DIST DIAS: 37.8 CM/S
RIGHT ICA DIST SYS: 83.9 CM/S
RIGHT ICA MID DIAS: 29.9 CM/S
RIGHT ICA MID SYS: 73.8 CM/S
RIGHT ICA PROX DIAS: 12.3 CM/S
RIGHT ICA PROX SYS: 45.7 CM/S
RIGHT ICA/CCA SYS: 1.16
RT BULB EDV: 11.8 CM/S
RT BULB PSV: 38.5 CM/S
RT ECA PROC EDV: 10.8 CM/S
RT VERTEBRAL MID EDV: 17.4 CM/S
RT VERTEBRAL MID PSV: 46.6 CM/S

## 2018-10-10 PROCEDURE — 93880 EXTRACRANIAL BILAT STUDY: CPT | Performed by: INTERNAL MEDICINE

## 2018-10-11 ENCOUNTER — TELEPHONE (OUTPATIENT)
Dept: CARDIOLOGY | Facility: CLINIC | Age: 83
End: 2018-10-11

## 2018-10-11 NOTE — TELEPHONE ENCOUNTER
Pt called to remind Dr. Willard to look at her Carotid US done on 10/10/18.    I faxed report to Dr. Cordero.

## 2018-10-11 NOTE — TELEPHONE ENCOUNTER
Can you let her know I looked at it no critical disease and that the copy was sent to her ophthalmologist

## 2019-03-29 NOTE — PROGRESS NOTES
Cardiology Note    Arpan Casillas Forrest is a 85 y.o. female 5/24/1933     She returns for follow-up and review her lab work  She had echocardiogram today images personally reviewed  Echo is stable aortic root 4.2 cm dilated stable aortic sclerosis mild aortic insufficiency dilated left atrium moderate mitral regurgitation normal LV systolic function no significant change from previous year  She has a mildly dilated aortic root 4 there is been stable for years and subcentimeter pulmonary nodules last CAT scan was October 2018  She has coronary artery disease diagnosed by calcification seen on CAT scan her last ischemic workup, pharmacologic nuclear stress test was negative in 2016  She has mitral valve prolapse with mild mitral regurgitation  Supraventricular tachycardia that was ablated in 2006  Mixed hyperlipidemia  Lab work was reviewed I purposely did not get her cholesterol because it is always elevated and I can never treated she is intolerant to most medications  She is taking no cardiac meds at this time  She admits to occasional palpitations lasting seconds with her history of SVT ablation in 2006 suspect she is feeling some APCs no therapy at this time unless symptoms worsen                  Patient Active Problem List    Diagnosis Date Noted   • Visual changes 10/08/2018   • Arthritis 04/09/2018   • Mixed hyperlipidemia 04/01/2018   • Essential hypertension 04/01/2018   • Coronary artery disease involving native coronary artery of native heart without angina pectoris 04/01/2018   • Dilated aortic root (CMS/HCC) ([facility]) 04/01/2018   • SVT (supraventricular tachycardia) (CMS/HCC) ([facility]) 04/01/2018   • Pulmonary nodule 04/01/2018   • RBBB 04/01/2018   • MVP (mitral valve prolapse) 04/01/2018       Allergy  Adenosine; Anesthesia s/i-40  [propofol]; Cephalosporins; Desvenlafaxine succinate; Dexbrompheniramine; Diltiazem; Escitalopram oxalate; Ezetimibe; Fluoxetine hcl; Lactase;  Metoprolol; Mirtazapine; Penicillins; Plant stanol leonila; Sertraline hcl; Statins-hmg-coa reductase inhibitors; and Sulfa (sulfonamide antibiotics)    MED LIST     Current Outpatient Prescriptions   Medication Sig Dispense Refill   • acetaminophen (TYLENOL ARTHRITIS PAIN) 650 mg 8 hr tablet Take 650 mg by mouth every 8 (eight) hours as needed for mild pain.     • cholecalciferol, vitamin D3, (cholecalciferol) 1,000 unit tablet Take 1 tablet by mouth daily.     • CYANOCOBALAMIN/FOLIC ACID (VITAMIN B12-FOLIC ACID) 500-400 mcg tablet Take 1 tablet by mouth daily.     • diclofenac sodium (VOLTAREN) 1 % topical gel Apply topically 2 (two) times a day as needed for mild pain.     • flaxseed oil 1,000 mg capsule Take 1 capsule by mouth as needed.       • folic acid (FOLVITE) 400 mcg tablet Take 1 tablet by mouth daily.     • Lactobac no.41-Bifidobact no.7 (PROBIOTIC-10) 70 mg (3 billion cell) capsule Take 1 capsule by mouth daily.     • lansoprazole (PREVACID SOLUTAB) 30 mg disintegrating tablet Take 30 mg by mouth daily.     • LORazepam (ATIVAN) 0.5 mg tablet Take 0.5 mg by mouth nightly as needed for anxiety.     • magnesium hydroxide (magnesium hydroxide) 400 mg/5 mL suspension Take 30 mL by mouth daily as needed.     • multivit-min-FA-lycopen-lutein (CENTRUM SILVER) tablet Take 1 tablet by mouth daily.     • polyvinyl alcohol-povidon,PF, (REFRESH CLASSIC, PF,) 1.4-0.6 % dropperette Take 1 drop by mouth 4 (four) times a day as needed.     • potassium gluconate 595 mg (99 mg) tablet Take 1 tablet by mouth daily.     • simethicone (GAS RELIEF) 125 mg capsule Take 125 mg by mouth daily.     • thiamine (vitamin B-1) 50 mg tablet Take 50 mg by mouth daily.     • vitamin E 400 unit capsule Take 1 tablet by mouth daily.       No current facility-administered medications for this visit.         Review of Systems   Constitution: Negative for malaise/fatigue, weight gain and weight loss.   HENT: Negative for hearing loss and  "hoarse voice.    Eyes: Negative for visual disturbance.   Cardiovascular: Negative for chest pain, claudication, cyanosis, dyspnea on exertion, irregular heartbeat, leg swelling, near-syncope, orthopnea, palpitations, paroxysmal nocturnal dyspnea and syncope.   Respiratory: Negative for cough, hemoptysis, shortness of breath, sleep disturbances due to breathing, snoring, sputum production and wheezing.    Endocrine: Negative for cold intolerance and heat intolerance.   Hematologic/Lymphatic: Negative.  Negative for bleeding problem. Does not bruise/bleed easily.   Skin: Negative.  Negative for rash.   Musculoskeletal: Positive for joint pain. Negative for arthritis, falls, muscle cramps and myalgias.   Gastrointestinal: Negative for abdominal pain, anorexia, change in bowel habit, constipation, diarrhea, dysphagia, heartburn, jaundice and nausea.   Genitourinary: Negative for frequency and nocturia.   Neurological: Negative for dizziness, focal weakness, headaches, light-headedness, numbness, tremors and vertigo.   Psychiatric/Behavioral: Negative for memory loss. The patient does not have insomnia and is not nervous/anxious.    Allergic/Immunologic: Negative for hives.       Labs                             No results found for: WBC, HGB, HCT, PLT, CHOL, TRIG, HDL, LDLDIRECT, TOTLDLC, LDLCALC, ALT, AST, NA, K, CL, CREATININE, BUN, CO2, TSH, INR, HGBA1C, BNP    No results found for: GLUCOSE, CALCIUM, NA, K, CO2, CL, BUN, CREATININE    Objective   Vitals:    04/08/19 0804   BP: (!) 148/98   BP Location: Left upper arm   Patient Position: Sitting   Pulse: 69   SpO2: 97%   Weight: 93 kg (205 lb)   Height: 1.6 m (5' 3\")     Physical Exam   Constitutional: She is oriented to person, place, and time. She appears well-developed and well-nourished. No distress.   HENT:   Head: Normocephalic and atraumatic.   Nose: Nose normal.   Eyes: Conjunctivae are normal. No scleral icterus.   Neck: No JVD present.   Cardiovascular: " Normal rate, regular rhythm, normal heart sounds and intact distal pulses.  Exam reveals no gallop and no friction rub.    No murmur heard.  2/6 systolic murmur   Pulmonary/Chest: Effort normal. No stridor. No respiratory distress. She has no wheezes. She has no rales. She exhibits no tenderness.   Abdominal: There is no tenderness.   Musculoskeletal: She exhibits no edema or deformity.   Uses a cane   Neurological: She is alert and oriented to person, place, and time.   Skin: Skin is warm and dry.   Psychiatric: She has a normal mood and affect.       Cardiac Procedures  Cardiac Procedures     Imaging  CAT SCAN     CTA 3/17 AOR ROOT 5.0 PULM NODULES NO [id number]     CTA 3/17 aorga 3.9 lll pulm nodules stable from 201`12     CAT scan October 2018 aortic root 4  Thyroid nodule  Right upper lobe tubular nodule  No change 2016     STRESS  pharm nuc neg 9/16        ECHO  Echo 4/18 Mac mild mr aortic root 4.2 aort sclerosis mild ai no 4/19 no change 2017       ELECTROPHYSIOLOGY  SVT ablation 2006      EKG normal EKG no change    Assessment/Plan:  SVT (supraventricular tachycardia) (CMS/HCC) (HCC)  She had SVT ablation in 2006 occasional palpitations lasting seconds no therapy at this time if increased episodes to consider monitor beta-blocker    Coronary artery disease involving native coronary artery of native heart without angina pectoris  This was diagnosed by calcification seen on CAT scan 2016 last pharmacologic nuclear stress test -2016 no chest pain normal LV systolic function EKG unchanged but does not tolerate any cholesterol medications conservative management  Any symptoms or change in EKG will repeat ischemic evaluation    Dilated aortic root (CMS/HCC) (HCC)  CAT scan October 2018 aortic root 4.2 stable from 2017 repeat CAT scan prior to next visit  Echocardiogram today aortic root four-point 2 aortic sclerosis mild aortic insufficiency normal LV systolic function mitral annular calcification moderate mitral  regurgitation, stable  If CAT scan done prior to next visit shows no change in aortic root side will probably do every 2 years    Mixed hyperlipidemia  He does not tolerate any therapy with statins bile acids questions, she did the newer injectable agents were not covered under her insurance plan   May try again to prescribe PC SK 9 next visit sometimes things change with time    Pulmonary nodule  Lobe nodule seen on routine CAT scans has been many years       We will see her back in 6 months noncontrast CAT scan of chest to follow-up on dilated aortic root if remains stable for the past 2 years will probably decrease frequency to every 2 years for CAT scan  Repeat echocardiogram in 1 year for LV function and valvular disease      Thank you for allowing me to participate in the care of this patient.  I hope this information is helpful.    Matias Willard MD [facility]   4/8/2019  Arpan Sethi

## 2019-04-08 ENCOUNTER — HOSPITAL ENCOUNTER (OUTPATIENT)
Dept: CARDIOLOGY | Facility: CLINIC | Age: 84
Discharge: HOME | End: 2019-04-08
Payer: MEDICARE

## 2019-04-08 ENCOUNTER — OFFICE VISIT (OUTPATIENT)
Dept: CARDIOLOGY | Facility: CLINIC | Age: 84
End: 2019-04-08
Payer: MEDICARE

## 2019-04-08 VITALS
SYSTOLIC BLOOD PRESSURE: 148 MMHG | BODY MASS INDEX: 36.32 KG/M2 | HEART RATE: 69 BPM | DIASTOLIC BLOOD PRESSURE: 98 MMHG | HEIGHT: 63 IN | WEIGHT: 205 LBS | OXYGEN SATURATION: 97 %

## 2019-04-08 VITALS
SYSTOLIC BLOOD PRESSURE: 130 MMHG | BODY MASS INDEX: 37.39 KG/M2 | HEIGHT: 63 IN | WEIGHT: 211 LBS | DIASTOLIC BLOOD PRESSURE: 70 MMHG

## 2019-04-08 DIAGNOSIS — I34.1 MVP (MITRAL VALVE PROLAPSE): ICD-10-CM

## 2019-04-08 DIAGNOSIS — I25.10 CORONARY ARTERY DISEASE INVOLVING NATIVE CORONARY ARTERY OF NATIVE HEART WITHOUT ANGINA PECTORIS: Primary | ICD-10-CM

## 2019-04-08 DIAGNOSIS — I10 ESSENTIAL HYPERTENSION: ICD-10-CM

## 2019-04-08 DIAGNOSIS — I77.810 DILATED AORTIC ROOT (CMS/HCC): ICD-10-CM

## 2019-04-08 DIAGNOSIS — I47.10 SVT (SUPRAVENTRICULAR TACHYCARDIA) (CMS/HCC): ICD-10-CM

## 2019-04-08 DIAGNOSIS — E78.2 MIXED HYPERLIPIDEMIA: ICD-10-CM

## 2019-04-08 LAB
AORTIC ROOT ANNULUS - M-MODE: 4 CM
AORTIC ROOT ANNULUS: 4.2 CM
AORTIC VALVE MEAN VELOCITY: 1.03 M/S
AORTIC VALVE VELOCITY TIME INTEGRAL: 33.5 CM
AV MEAN GRADIENT: 5 MMHG
AV PEAK GRADIENT: 8 MMHG
AV PEAK VELOCITY-S: 1.45 M/S
AV VALVE AREA: 2.3 CM2
BSA FOR ECHO PROCEDURE: 2.06 M2
CUSP SEPARATION: 2.2 CM
DOP CALC LVOT STROKE VOLUME: 76.62 ML
E WAVE DECELERATION TIME: 194 MS
E/A RATIO: 1.2
E/E' RATIO: 18.7
E/LAT E' RATIO: 15.4
EDV (BP): 54.1 CM3
EF (A4C): 66.1 %
EF A2C: 52.5 %
EJECTION FRACTION: 59.3 %
EST RIGHT VENT SYSTOLIC PRESSURE BY TRICUSPID REGURGITATION JET: 40 MMHG
ESV (BP): 22 CM3
FRACTIONAL SHORTENING: 37.4 %
INTERVENTRICULAR SEPTUM: 0.67 CM
LA ESV (BP): 99.5 CM3
LA ESV INDEX (A2C): 57.28 CM3/M2
LA ESV INDEX (BP): 48.3 CM3/M2
LA/AORTA RATIO: 0.9
LAAS-AP2: 31.8 CM2
LAAS-AP4: 24.5 CM2
LAD 2D - M-MODE: 3.6 CM
LALD A4C: 6.12 CM
LALD A4C: 6.98 CM
LAV-S: 118 CM3
LEFT ATRIUM VOLUME INDEX: 36.02 CM3/M2
LEFT ATRIUM VOLUME: 74.2 CM3
LEFT INTERNAL DIMENSION IN SYSTOLE: 2.8 CM (ref 2.85–4.32)
LEFT VENTRICLE DIASTOLIC VOLUME INDEX: 35.44 CM3/M2
LEFT VENTRICLE DIASTOLIC VOLUME: 73 CM3
LEFT VENTRICLE SYSTOLIC VOLUME INDEX: 11.99 CM3/M2
LEFT VENTRICLE SYSTOLIC VOLUME: 24.7 CM3
LEFT VENTRICULAR INTERNAL DIMENSION IN DIASTOLE: 4.47 CM (ref 4.84–6.73)
LEFT VENTRICULAR POSTERIOR WALL IN END DIASTOLE: 0.95 CM (ref 0.63–1.17)
LV DIASTOLIC VOLUME: 39.8 CM3
LV ESV (APICAL 2 CHAMBER): 18.9 CM3
LVAD-AP2: 17.5 CM2
LVAD-AP4: 24.3 CM2
LVAS-AP2: 10.9 CM2
LVAS-AP4: 12.4 CM2
LVEDVI(A2C): 19.32 CM3/M2
LVEDVI(BP): 26.26 CM3/M2
LVESVI(A2C): 9.17 CM3/M2
LVESVI(BP): 10.68 CM3/M2
LVLD-AP2: 6.43 CM
LVLD-AP4: 6.52 CM
LVLS-AP2: 5.46 CM
LVLS-AP4: 5.23 CM
LVOT 2D: 2 CM
LVOT A: 3.14 CM2
LVOT MG: 2 MMHG
LVOT MV: 0.7 M/S
LVOT PEAK VELOCITY: 1.06 M/S
LVOT PG: 4 MMHG
LVOT VTI: 24.4 CM
MV E'TISSUE VEL-LAT: 0.07 M/S
MV E'TISSUE VEL-MED: 0.06 M/S
MV PEAK A VEL: 0.93 M/S
MV PEAK E VEL: 1.14 M/S
POSTERIOR WALL: 0.95 CM
RVOT VMAX: 0.62 M/S
SEPTAL TISSUE DOPPLER FREE WALL LATE DIA VELOCITY (APICAL 4 CHAMBER VIEW): 0.12 M/S
TR MAX PG: 32 MMHG
TRICUSPID VALVE PEAK REGURGITATION VELOCITY: 2.85 M/S
Z-SCORE OF LEFT VENTRICULAR DIMENSION IN END DIASTOLE: -2.44
Z-SCORE OF LEFT VENTRICULAR DIMENSION IN END SYSTOLE: -1.78
Z-SCORE OF LEFT VENTRICULAR POSTERIOR WALL IN END DIASTOLE: 0.53

## 2019-04-08 PROCEDURE — 93000 ELECTROCARDIOGRAM COMPLETE: CPT | Performed by: INTERNAL MEDICINE

## 2019-04-08 PROCEDURE — 93306 TTE W/DOPPLER COMPLETE: CPT | Performed by: INTERNAL MEDICINE

## 2019-04-08 PROCEDURE — 99214 OFFICE O/P EST MOD 30 MIN: CPT | Performed by: INTERNAL MEDICINE

## 2019-04-08 RX ORDER — DEXTROMETHORPHAN HYDROBROMIDE, GUAIFENESIN 5; 100 MG/5ML; MG/5ML
650 LIQUID ORAL EVERY 8 HOURS PRN
COMMUNITY
End: 2020-06-08

## 2019-04-08 RX ORDER — DICLOFENAC SODIUM 10 MG/G
GEL TOPICAL 2 TIMES DAILY PRN
COMMUNITY
End: 2020-12-14

## 2019-04-08 ASSESSMENT — ENCOUNTER SYMPTOMS
DYSPNEA ON EXERTION: 0
INSOMNIA: 0
DIARRHEA: 0
HOARSE VOICE: 0
WEIGHT GAIN: 0
IRREGULAR HEARTBEAT: 0
MEMORY LOSS: 0
CLAUDICATION: 0
HEMOPTYSIS: 0
WEIGHT LOSS: 0
COUGH: 0
LIGHT-HEADEDNESS: 0
NERVOUS/ANXIOUS: 0
WHEEZING: 0
ANOREXIA: 0
PALPITATIONS: 0
DIZZINESS: 0
CONSTIPATION: 0
HEADACHES: 0
MUSCLE CRAMPS: 0
SLEEP DISTURBANCES DUE TO BREATHING: 0
JAUNDICE: 0
FOCAL WEAKNESS: 0
HEMATOLOGIC/LYMPHATIC NEGATIVE: 1
ABDOMINAL PAIN: 0
SNORING: 0
FREQUENCY: 0
TREMORS: 0
HEARTBURN: 0
SHORTNESS OF BREATH: 0
PND: 0
VERTIGO: 0
FALLS: 0
ORTHOPNEA: 0
MYALGIAS: 0
NAUSEA: 0
SPUTUM PRODUCTION: 0
NEAR-SYNCOPE: 0
SYNCOPE: 0
BRUISES/BLEEDS EASILY: 0
NUMBNESS: 0
CHANGE IN BOWEL HABIT: 0

## 2019-04-08 NOTE — ASSESSMENT & PLAN NOTE
This was diagnosed by calcification seen on CAT scan 2016 last pharmacologic nuclear stress test -2016 no chest pain normal LV systolic function EKG unchanged but does not tolerate any cholesterol medications conservative management  Any symptoms or change in EKG will repeat ischemic evaluation

## 2019-04-08 NOTE — ASSESSMENT & PLAN NOTE
She had SVT ablation in 2006 occasional palpitations lasting seconds no therapy at this time if increased episodes to consider monitor beta-blocker

## 2019-04-08 NOTE — ASSESSMENT & PLAN NOTE
He does not tolerate any therapy with statins bile acids questions, she did the newer injectable agents were not covered under her insurance plan   May try again to prescribe PC SK 9 next visit sometimes things change with time

## 2019-04-08 NOTE — LETTER
April 8, 2019     Arpan Casillas  [address]    [place] [id number]    Patient: Kathy Clayton   YOB: 1933   Date of Visit: 4/8/2019       Dear Tino    Thank you for referring Kathy Clayton to me for evaluation. Below are my notes for this consultation.    If you have questions, please do not hesitate to call me. I look forward to following your patient along with you.         Sincerely,        Matias Willard MD        CC: MD Sudheer Rosenbaum, Matias LIND MD  4/8/2019 10:14 AM  Sign at close encounter      Cardiology Note    Arpan Casillas is a 85 y.o. female 5/24/1933     She returns for follow-up and review her lab work  She had echocardiogram today images personally reviewed  Echo is stable aortic root 4.2 cm dilated stable aortic sclerosis mild aortic insufficiency dilated left atrium moderate mitral regurgitation normal LV systolic function no significant change from previous year  She has a mildly dilated aortic root 4 there is been stable for years and subcentimeter pulmonary nodules last CAT scan was October 2018  She has coronary artery disease diagnosed by calcification seen on CAT scan her last ischemic workup, pharmacologic nuclear stress test was negative in 2016  She has mitral valve prolapse with mild mitral regurgitation  Supraventricular tachycardia that was ablated in 2006  Mixed hyperlipidemia  Lab work was reviewed I purposely did not get her cholesterol because it is always elevated and I can never treated she is intolerant to most medications  She is taking no cardiac meds at this time  She admits to occasional palpitations lasting seconds with her history of SVT ablation in 2006 suspect she is feeling some APCs no therapy at this time unless symptoms worsen                  Patient Active Problem List    Diagnosis Date Noted   • Visual changes 10/08/2018   • Arthritis 04/09/2018   • Mixed hyperlipidemia 04/01/2018   •  Essential hypertension 04/01/2018   • Coronary artery disease involving native coronary artery of native heart without angina pectoris 04/01/2018   • Dilated aortic root (CMS/HCC) ([facility]) 04/01/2018   • SVT (supraventricular tachycardia) (CMS/HCC) ([facility]) 04/01/2018   • Pulmonary nodule 04/01/2018   • RBBB 04/01/2018   • MVP (mitral valve prolapse) 04/01/2018       Allergy  Adenosine; Anesthesia s/i-40  [propofol]; Cephalosporins; Desvenlafaxine succinate; Dexbrompheniramine; Diltiazem; Escitalopram oxalate; Ezetimibe; Fluoxetine hcl; Lactase; Metoprolol; Mirtazapine; Penicillins; Plant stanol leonila; Sertraline hcl; Statins-hmg-coa reductase inhibitors; and Sulfa (sulfonamide antibiotics)    MED LIST     Current Outpatient Prescriptions   Medication Sig Dispense Refill   • acetaminophen (TYLENOL ARTHRITIS PAIN) 650 mg 8 hr tablet Take 650 mg by mouth every 8 (eight) hours as needed for mild pain.     • cholecalciferol, vitamin D3, (cholecalciferol) 1,000 unit tablet Take 1 tablet by mouth daily.     • CYANOCOBALAMIN/FOLIC ACID (VITAMIN B12-FOLIC ACID) 500-400 mcg tablet Take 1 tablet by mouth daily.     • diclofenac sodium (VOLTAREN) 1 % topical gel Apply topically 2 (two) times a day as needed for mild pain.     • flaxseed oil 1,000 mg capsule Take 1 capsule by mouth as needed.       • folic acid (FOLVITE) 400 mcg tablet Take 1 tablet by mouth daily.     • Lactobac no.41-Bifidobact no.7 (PROBIOTIC-10) 70 mg (3 billion cell) capsule Take 1 capsule by mouth daily.     • lansoprazole (PREVACID SOLUTAB) 30 mg disintegrating tablet Take 30 mg by mouth daily.     • LORazepam (ATIVAN) 0.5 mg tablet Take 0.5 mg by mouth nightly as needed for anxiety.     • magnesium hydroxide (magnesium hydroxide) 400 mg/5 mL suspension Take 30 mL by mouth daily as needed.     • multivit-min-FA-lycopen-lutein (CENTRUM SILVER) tablet Take 1 tablet by mouth daily.     • polyvinyl alcohol-povidon,PF, (REFRESH CLASSIC, PF,) 1.4-0.6 % dropperette  Take 1 drop by mouth 4 (four) times a day as needed.     • potassium gluconate 595 mg (99 mg) tablet Take 1 tablet by mouth daily.     • simethicone (GAS RELIEF) 125 mg capsule Take 125 mg by mouth daily.     • thiamine (vitamin B-1) 50 mg tablet Take 50 mg by mouth daily.     • vitamin E 400 unit capsule Take 1 tablet by mouth daily.       No current facility-administered medications for this visit.         Review of Systems   Constitution: Negative for malaise/fatigue, weight gain and weight loss.   HENT: Negative for hearing loss and hoarse voice.    Eyes: Negative for visual disturbance.   Cardiovascular: Negative for chest pain, claudication, cyanosis, dyspnea on exertion, irregular heartbeat, leg swelling, near-syncope, orthopnea, palpitations, paroxysmal nocturnal dyspnea and syncope.   Respiratory: Negative for cough, hemoptysis, shortness of breath, sleep disturbances due to breathing, snoring, sputum production and wheezing.    Endocrine: Negative for cold intolerance and heat intolerance.   Hematologic/Lymphatic: Negative.  Negative for bleeding problem. Does not bruise/bleed easily.   Skin: Negative.  Negative for rash.   Musculoskeletal: Positive for joint pain. Negative for arthritis, falls, muscle cramps and myalgias.   Gastrointestinal: Negative for abdominal pain, anorexia, change in bowel habit, constipation, diarrhea, dysphagia, heartburn, jaundice and nausea.   Genitourinary: Negative for frequency and nocturia.   Neurological: Negative for dizziness, focal weakness, headaches, light-headedness, numbness, tremors and vertigo.   Psychiatric/Behavioral: Negative for memory loss. The patient does not have insomnia and is not nervous/anxious.    Allergic/Immunologic: Negative for hives.       Labs                             No results found for: WBC, HGB, HCT, PLT, CHOL, TRIG, HDL, LDLDIRECT, TOTLDLC, LDLCALC, ALT, AST, NA, K, CL, CREATININE, BUN, CO2, TSH, INR, HGBA1C, BNP    No results found for:  "GLUCOSE, CALCIUM, NA, K, CO2, CL, BUN, CREATININE    Objective   Vitals:    04/08/19 0804   BP: (!) 148/98   BP Location: Left upper arm   Patient Position: Sitting   Pulse: 69   SpO2: 97%   Weight: 93 kg (205 lb)   Height: 1.6 m (5' 3\")     Physical Exam   Constitutional: She is oriented to person, place, and time. She appears well-developed and well-nourished. No distress.   HENT:   Head: Normocephalic and atraumatic.   Nose: Nose normal.   Eyes: Conjunctivae are normal. No scleral icterus.   Neck: No JVD present.   Cardiovascular: Normal rate, regular rhythm, normal heart sounds and intact distal pulses.  Exam reveals no gallop and no friction rub.    No murmur heard.  2/6 systolic murmur   Pulmonary/Chest: Effort normal. No stridor. No respiratory distress. She has no wheezes. She has no rales. She exhibits no tenderness.   Abdominal: There is no tenderness.   Musculoskeletal: She exhibits no edema or deformity.   Uses a cane   Neurological: She is alert and oriented to person, place, and time.   Skin: Skin is warm and dry.   Psychiatric: She has a normal mood and affect.       Cardiac Procedures  Cardiac Procedures     Imaging  CAT SCAN     CTA 3/17 AOR ROOT 5.0 PULM NODULES NO [id number]     CTA 3/17 aorga 3.9 lll pulm nodules stable from 201`12     CAT scan October 2018 aortic root 4  Thyroid nodule  Right upper lobe tubular nodule  No change 2016     STRESS  pharm nuc neg 9/16        ECHO  Echo 4/18 Mac mild mr aortic root 4.2 aort sclerosis mild ai no 4/19 no change 2017       ELECTROPHYSIOLOGY  SVT ablation 2006      EKG normal EKG no change    Assessment/Plan:  SVT (supraventricular tachycardia) (CMS/HCC) (HCC)  She had SVT ablation in 2006 occasional palpitations lasting seconds no therapy at this time if increased episodes to consider monitor beta-blocker    Coronary artery disease involving native coronary artery of native heart without angina pectoris  This was diagnosed by calcification seen on CAT " scan 2016 last pharmacologic nuclear stress test -2016 no chest pain normal LV systolic function EKG unchanged but does not tolerate any cholesterol medications conservative management  Any symptoms or change in EKG will repeat ischemic evaluation    Dilated aortic root (CMS/HCC) (HCC)  CAT scan October 2018 aortic root 4.2 stable from 2017 repeat CAT scan prior to next visit  Echocardiogram today aortic root four-point 2 aortic sclerosis mild aortic insufficiency normal LV systolic function mitral annular calcification moderate mitral regurgitation, stable  If CAT scan done prior to next visit shows no change in aortic root side will probably do every 2 years    Mixed hyperlipidemia  He does not tolerate any therapy with statins bile acids questions, she did the newer injectable agents were not covered under her insurance plan   May try again to prescribe PC SK 9 next visit sometimes things change with time    Pulmonary nodule  Lobe nodule seen on routine CAT scans has been many years       We will see her back in 6 months noncontrast CAT scan of chest to follow-up on dilated aortic root if remains stable for the past 2 years will probably decrease frequency to every 2 years for CAT scan  Repeat echocardiogram in 1 year for LV function and valvular disease      Thank you for allowing me to participate in the care of this patient.  I hope this information is helpful.    Matias Willard MD [facility]   4/8/2019  Arpan Sethi

## 2019-04-08 NOTE — ASSESSMENT & PLAN NOTE
CAT scan October 2018 aortic root 4.2 stable from 2017 repeat CAT scan prior to next visit  Echocardiogram today aortic root four-point 2 aortic sclerosis mild aortic insufficiency normal LV systolic function mitral annular calcification moderate mitral regurgitation, stable  If CAT scan done prior to next visit shows no change in aortic root side will probably do every 2 years

## 2019-05-15 ENCOUNTER — TELEPHONE (OUTPATIENT)
Dept: SCHEDULING | Facility: CLINIC | Age: 84
End: 2019-05-15

## 2019-05-15 ASSESSMENT — ENCOUNTER SYMPTOMS
MUSCLE CRAMPS: 0
CONSTIPATION: 0
FALLS: 0
BRUISES/BLEEDS EASILY: 0
MYALGIAS: 0
VERTIGO: 0
COUGH: 0
JAUNDICE: 0
FREQUENCY: 0
DYSPNEA ON EXERTION: 0
HOARSE VOICE: 0
TREMORS: 0
MEMORY LOSS: 0
NEAR-SYNCOPE: 0
LIGHT-HEADEDNESS: 0
INSOMNIA: 0
FOCAL WEAKNESS: 0
CHANGE IN BOWEL HABIT: 0
SNORING: 0
WEIGHT LOSS: 0
HEMATOLOGIC/LYMPHATIC NEGATIVE: 1
SHORTNESS OF BREATH: 0
DIZZINESS: 0
SPUTUM PRODUCTION: 0
DIARRHEA: 0
NAUSEA: 0
PALPITATIONS: 0
HEADACHES: 0
HEMOPTYSIS: 0
IRREGULAR HEARTBEAT: 0
ORTHOPNEA: 0
NERVOUS/ANXIOUS: 0
ABDOMINAL PAIN: 0
HEARTBURN: 0
SYNCOPE: 0
NUMBNESS: 0
PND: 0
CLAUDICATION: 0
SLEEP DISTURBANCES DUE TO BREATHING: 0
ANOREXIA: 0
WHEEZING: 0
WEIGHT GAIN: 0

## 2019-05-15 NOTE — PROGRESS NOTES
Cardiology Note    Arpan Casillas Forrest is a 85 y.o. female 5/24/1933     She is here for an unscheduled visit she has been feeling kind of foggy and dizzy off balance mildly elevated blood pressure 160 systolic in your office   She had some midepigastric abdominal pain also that resolved she is chronic issues  With GERD and was evaluated at [facility] in the past  She was evaluated in the emergency room    Records reviewed lab work unremarkable CAT scan of his small vessel disease no acute issues  She also CAT scan of abdomen that was unremarkable    She has history of SVT ablation 2006  She has CAD diagnosed by calcification seen on CAT scan her last ischemic evaluation was negative in 2016 she has normal LV systolic function  She is dilated aortic root 4.2 cm last imaged October 2018 has been stable for several years  His aortic sclerosis mild aortic insufficiency  Stable pulmonary nodules and mixed hyperlipidemia intolerant to statin therapy and Zetia           Patient Active Problem List    Diagnosis Date Noted   • Dizzy 05/16/2019   • Gastroesophageal reflux disease without esophagitis 05/16/2019   • Visual changes 10/08/2018   • Arthritis 04/09/2018   • Mixed hyperlipidemia 04/01/2018   • Essential hypertension 04/01/2018   • Coronary artery disease involving native coronary artery of native heart without angina pectoris 04/01/2018   • Dilated aortic root (CMS/HCC) ([facility]) 04/01/2018   • SVT (supraventricular tachycardia) (CMS/HCC) ([facility]) 04/01/2018   • Pulmonary nodule 04/01/2018   • RBBB 04/01/2018   • MVP (mitral valve prolapse) 04/01/2018       Allergy  Adenosine; Anesthesia s/i-40  [propofol]; Cephalosporins; Desvenlafaxine succinate; Dexbrompheniramine; Diltiazem; Escitalopram oxalate; Ezetimibe; Fluoxetine hcl; Lactase; Metoprolol; Mirtazapine; Penicillins; Plant stanol leonila; Sertraline hcl; Statins-hmg-coa reductase inhibitors; and Sulfa (sulfonamide  antibiotics)    MED LIST     Current Outpatient Prescriptions   Medication Sig Dispense Refill   • acetaminophen (TYLENOL ARTHRITIS PAIN) 650 mg 8 hr tablet Take 650 mg by mouth every 8 (eight) hours as needed for mild pain.     • cholecalciferol, vitamin D3, (cholecalciferol) 1,000 unit tablet Take 1 tablet by mouth daily.     • CYANOCOBALAMIN/FOLIC ACID (VITAMIN B12-FOLIC ACID) 500-400 mcg tablet Take 1 tablet by mouth daily.     • diclofenac sodium (VOLTAREN) 1 % topical gel Apply topically 2 (two) times a day as needed for mild pain.     • flaxseed oil 1,000 mg capsule Take 1 capsule by mouth as needed.       • folic acid (FOLVITE) 400 mcg tablet Take 1 tablet by mouth daily.     • Lactobac no.41-Bifidobact no.7 (PROBIOTIC-10) 70 mg (3 billion cell) capsule Take 1 capsule by mouth daily.     • lansoprazole (PREVACID SOLUTAB) 30 mg disintegrating tablet Take 30 mg by mouth daily.     • LORazepam (ATIVAN) 0.5 mg tablet Take 0.5 mg by mouth nightly as needed for anxiety.     • magnesium hydroxide (magnesium hydroxide) 400 mg/5 mL suspension Take 30 mL by mouth daily as needed.     • multivit-min-FA-lycopen-lutein (CENTRUM SILVER) tablet Take 1 tablet by mouth daily.     • polyvinyl alcohol-povidon,PF, (REFRESH CLASSIC, PF,) 1.4-0.6 % dropperette Take 1 drop by mouth 4 (four) times a day as needed.     • potassium gluconate 595 mg (99 mg) tablet Take 1 tablet by mouth daily.     • simethicone (GAS RELIEF) 125 mg capsule Take 125 mg by mouth daily.     • thiamine (vitamin B-1) 50 mg tablet Take 50 mg by mouth daily.     • vitamin E 400 unit capsule Take 1 tablet by mouth daily.     • meclizine (ANTIVERT) 25 mg tablet TAKE ONE TABLET BY MOUTH THREE TIMES A DAY AS NEEDED FOR DIZZINESS FOR UP TO 3 DAYS  0     No current facility-administered medications for this visit.         Review of Systems   Constitution: Positive for weakness and malaise/fatigue. Negative for weight gain and weight loss.   HENT: Negative for  "hearing loss and hoarse voice.    Eyes: Negative for visual disturbance.   Cardiovascular: Negative for chest pain, claudication, cyanosis, dyspnea on exertion, irregular heartbeat, leg swelling, near-syncope, orthopnea, palpitations, paroxysmal nocturnal dyspnea and syncope.   Respiratory: Negative for cough, hemoptysis, shortness of breath, sleep disturbances due to breathing, snoring, sputum production and wheezing.    Endocrine: Negative for cold intolerance and heat intolerance.   Hematologic/Lymphatic: Negative.  Negative for bleeding problem. Does not bruise/bleed easily.   Skin: Negative.  Negative for rash.   Musculoskeletal: Positive for joint pain. Negative for arthritis, falls, muscle cramps and myalgias.   Gastrointestinal: Negative for abdominal pain, anorexia, change in bowel habit, constipation, diarrhea, dysphagia, heartburn, jaundice and nausea.   Genitourinary: Negative for frequency and nocturia.   Neurological: Negative for dizziness, focal weakness, headaches, light-headedness, numbness, tremors and vertigo.   Psychiatric/Behavioral: Negative for memory loss. The patient does not have insomnia and is not nervous/anxious.    Allergic/Immunologic: Negative for hives.       Labs                             No results found for: WBC, HGB, HCT, PLT, CHOL, TRIG, HDL, LDLDIRECT, TOTLDLC, LDLCALC, ALT, AST, NA, K, CL, CREATININE, BUN, CO2, TSH, INR, HGBA1C, BNP    No results found for: GLUCOSE, CALCIUM, NA, K, CO2, CL, BUN, CREATININE    Objective   Vitals:    05/16/19 1101 05/16/19 1104   BP:  132/80   BP Location:  Left upper arm   Patient Position:  Sitting   Pulse:  75   SpO2:  97%   Weight:  92.5 kg (204 lb)   Height: 1.6 m (5' 3\") 1.6 m (5' 3\")     Physical Exam   Constitutional: She is oriented to person, place, and time. She appears well-developed and well-nourished. No distress.   HENT:   Head: Normocephalic and atraumatic.   Nose: Nose normal.   Eyes: Conjunctivae are normal. No scleral " icterus.   Neck: No JVD present.   Cardiovascular: Normal rate, regular rhythm, normal heart sounds and intact distal pulses.  Exam reveals no gallop and no friction rub.    No murmur heard.  2/6 systolic murmur   Pulmonary/Chest: Effort normal. No stridor. No respiratory distress. She has no wheezes. She has no rales. She exhibits no tenderness.   Abdominal: There is no tenderness.   Musculoskeletal: She exhibits no edema or deformity.   Uses a cane   Neurological: She is alert and oriented to person, place, and time.   Skin: Skin is warm and dry.   Psychiatric: She has a normal mood and affect.       Cardiac Procedures  Cardiac Procedures     Imaging  CAT SCAN     CTA 3/17 AOR ROOT 5.0 PULM NODULES NO [id number]     CTA 3/17 aorga 3.9 lll pulm nodules stable from 201`12     CAT scan October 2018 aortic root 4  Thyroid nodule  Right upper lobe tubular nodule  No change 2016     Cat scan head 5/19 small vessel disease      STRESS  pharm nuc neg 9/16        ECHO  Echo  Mac mild mr aortic root 4.2 aort sclerosis mild ai no 4/19 no change 2017       VASCULAR  Carotid 10/18 mild disease      CAT SCAN  Cat scan9/19 aorta 4 rll inflammation    ELECTROPHYSIOLOGY  SVT ablation 2006      EKG normal EKG no change  ]        Assessment/Plan:  Dizzy  She had an episode of dizziness midepigastric pain feeling off balance blood pressure was elevated for her at 160  ER evaluation including CAT scan head and abdomen unremarkable    Coronary artery disease involving native coronary artery of native heart without angina pectoris  CAD based on calcification seen on remote CAT scan  Last ischemic evaluation was -September 2016 EKG is normal no suspicious symptoms echocardiogram normal April 2019  Probably will not repeat stress testing unless change in EKG or suspicious symptoms  She does not tolerate aspirin or statins we talked about PCSK9 inhibitors again today but she declined    Dilated aortic root (CMS/HCC) (HCC)  Last image  October 2018 4.05 and a pulmonary nodule stable has been stable for several years recheck CAT scan October 2020 recheck echocardiogram April 2020    Pulmonary nodule  Chronic pulmonary nodule stable for many years will reimage when followed her aortic root size CAT scan due October 2000 o    Mixed hyperlipidemia  Intolerant to usual therapies we talked about PCSK9 inhibitors we have tried in the past money was an issue I brought it up again but she declines    SVT (supraventricular tachycardia) (CMS/HCC) (HCC)  She had ablation 2006    Gastroesophageal reflux disease without esophagitis  Chronic issue with abdominal pain recent CAT scan negative has been evaluated [facility] treated for GERD       Transient episode of dizziness and being off balance acute evaluation in the emergency room with CAT scan unremarkable  She has had carotid duplex in October 2018 nonobstructive disease  She will be following up with neurology Dr. Sacks for further evaluation   we talked about the PCSK9 inhibitors for her hyperlipidemia, but she declined at this time  She has a mildly dilated aortic root we have been following for years and subcentimeter pulmonary nodules are stable repeat noncontrast CAT scan October 2020  Echocardiogram April 2020  Follow-up in 6 months with lab work        Thank you for allowing me to participate in the care of this patient.  I hope this information is helpful.    Matias Willard MD [facility]   5/16/2019  Arpan Sethi

## 2019-05-15 NOTE — TELEPHONE ENCOUNTER
Spoke w/ Radha and confirmed if pt is okay to see Dr. Willard for 5/16 for HFU, was advised to place pt on for 11 am and confirmed with the pt, not able to place pt for 11 am on the schedule on my end. Please place pt on the schedule for 5/16 for Dr. Willard at 11 am.

## 2019-05-15 NOTE — TELEPHONE ENCOUNTER
I called pt -- She states that she hasn't been feeling well. She went to Dr. Casillas- her BP was 160/90.    He sent her to [facility].    They did EKGs, Labs CT head, CT abd.    Pt is very tired and sick to stomach, her head feels cloudy.    Sonja wants her to see Sacks(neurology)- She is scheduled for June.     Pt is scheduled for 5/16/19- [facility].       Pt states she has CT reports, she will fax them to me.    I also faxed [facility] for records.

## 2019-05-16 ENCOUNTER — OFFICE VISIT (OUTPATIENT)
Dept: CARDIOLOGY | Facility: CLINIC | Age: 84
End: 2019-05-16
Payer: MEDICARE

## 2019-05-16 VITALS
HEART RATE: 75 BPM | SYSTOLIC BLOOD PRESSURE: 132 MMHG | HEIGHT: 63 IN | WEIGHT: 204 LBS | DIASTOLIC BLOOD PRESSURE: 80 MMHG | BODY MASS INDEX: 36.14 KG/M2 | OXYGEN SATURATION: 97 %

## 2019-05-16 DIAGNOSIS — I25.10 CAD IN NATIVE ARTERY: Primary | ICD-10-CM

## 2019-05-16 DIAGNOSIS — I10 ESSENTIAL HYPERTENSION: ICD-10-CM

## 2019-05-16 PROBLEM — R42 DIZZY: Status: ACTIVE | Noted: 2019-05-16

## 2019-05-16 PROBLEM — K21.9 GASTROESOPHAGEAL REFLUX DISEASE WITHOUT ESOPHAGITIS: Status: ACTIVE | Noted: 2019-05-16

## 2019-05-16 PROCEDURE — 93000 ELECTROCARDIOGRAM COMPLETE: CPT | Performed by: INTERNAL MEDICINE

## 2019-05-16 PROCEDURE — 99215 OFFICE O/P EST HI 40 MIN: CPT | Performed by: INTERNAL MEDICINE

## 2019-05-16 RX ORDER — MECLIZINE HYDROCHLORIDE 25 MG/1
TABLET ORAL
Refills: 0 | COMMUNITY
Start: 2019-05-08 | End: 2019-10-14

## 2019-05-16 ASSESSMENT — ENCOUNTER SYMPTOMS: WEAKNESS: 1

## 2019-05-16 NOTE — ASSESSMENT & PLAN NOTE
Chronic issue with abdominal pain recent CAT scan negative has been evaluated [facility] treated for GERD

## 2019-05-16 NOTE — LETTER
May 16, 2019     Arpan Casillas  [address]    [place] [id number]    Patient: Kathy Clayton   YOB: 1933   Date of Visit: 5/16/2019       Dear Tino  Thank you for referring Kathy Clayton to me for evaluation. Below are my notes for this consultation.    If you have questions, please do not hesitate to call me. I look forward to following your patient along with you.         Sincerely,        Matias Willard MD        CC: Jill Schneider, MD Stephen E Sacks, DO William H Lipshutz, MD Becker, Matias LIND MD  5/16/2019 11:41 AM  Sign at close encounter      Cardiology Note    Arpan Casillas is a 85 y.o. female 5/24/1933     She is here for an unscheduled visit she has been feeling kind of foggy and dizzy off balance mildly elevated blood pressure 160 systolic in your office   She had some midepigastric abdominal pain also that resolved she is chronic issues  With GERD and was evaluated at [facility] in the past  She was evaluated in the emergency room    Records reviewed lab work unremarkable CAT scan of his small vessel disease no acute issues  She also CAT scan of abdomen that was unremarkable    She has history of SVT ablation 2006  She has CAD diagnosed by calcification seen on CAT scan her last ischemic evaluation was negative in 2016 she has normal LV systolic function  She is dilated aortic root 4.2 cm last imaged October 2018 has been stable for several years  His aortic sclerosis mild aortic insufficiency  Stable pulmonary nodules and mixed hyperlipidemia intolerant to statin therapy and Zetia           Patient Active Problem List    Diagnosis Date Noted   • Dizzy 05/16/2019   • Gastroesophageal reflux disease without esophagitis 05/16/2019   • Visual changes 10/08/2018   • Arthritis 04/09/2018   • Mixed hyperlipidemia 04/01/2018   • Essential hypertension 04/01/2018   • Coronary artery disease involving native coronary artery  of native heart without angina pectoris 04/01/2018   • Dilated aortic root (CMS/HCC) ([facility]) 04/01/2018   • SVT (supraventricular tachycardia) (CMS/HCC) ([facility]) 04/01/2018   • Pulmonary nodule 04/01/2018   • RBBB 04/01/2018   • MVP (mitral valve prolapse) 04/01/2018       Allergy  Adenosine; Anesthesia s/i-40  [propofol]; Cephalosporins; Desvenlafaxine succinate; Dexbrompheniramine; Diltiazem; Escitalopram oxalate; Ezetimibe; Fluoxetine hcl; Lactase; Metoprolol; Mirtazapine; Penicillins; Plant stanol leonila; Sertraline hcl; Statins-hmg-coa reductase inhibitors; and Sulfa (sulfonamide antibiotics)    MED LIST     Current Outpatient Prescriptions   Medication Sig Dispense Refill   • acetaminophen (TYLENOL ARTHRITIS PAIN) 650 mg 8 hr tablet Take 650 mg by mouth every 8 (eight) hours as needed for mild pain.     • cholecalciferol, vitamin D3, (cholecalciferol) 1,000 unit tablet Take 1 tablet by mouth daily.     • CYANOCOBALAMIN/FOLIC ACID (VITAMIN B12-FOLIC ACID) 500-400 mcg tablet Take 1 tablet by mouth daily.     • diclofenac sodium (VOLTAREN) 1 % topical gel Apply topically 2 (two) times a day as needed for mild pain.     • flaxseed oil 1,000 mg capsule Take 1 capsule by mouth as needed.       • folic acid (FOLVITE) 400 mcg tablet Take 1 tablet by mouth daily.     • Lactobac no.41-Bifidobact no.7 (PROBIOTIC-10) 70 mg (3 billion cell) capsule Take 1 capsule by mouth daily.     • lansoprazole (PREVACID SOLUTAB) 30 mg disintegrating tablet Take 30 mg by mouth daily.     • LORazepam (ATIVAN) 0.5 mg tablet Take 0.5 mg by mouth nightly as needed for anxiety.     • magnesium hydroxide (magnesium hydroxide) 400 mg/5 mL suspension Take 30 mL by mouth daily as needed.     • multivit-min-FA-lycopen-lutein (CENTRUM SILVER) tablet Take 1 tablet by mouth daily.     • polyvinyl alcohol-povidon,PF, (REFRESH CLASSIC, PF,) 1.4-0.6 % dropperette Take 1 drop by mouth 4 (four) times a day as needed.     • potassium gluconate 595 mg (99 mg)  tablet Take 1 tablet by mouth daily.     • simethicone (GAS RELIEF) 125 mg capsule Take 125 mg by mouth daily.     • thiamine (vitamin B-1) 50 mg tablet Take 50 mg by mouth daily.     • vitamin E 400 unit capsule Take 1 tablet by mouth daily.     • meclizine (ANTIVERT) 25 mg tablet TAKE ONE TABLET BY MOUTH THREE TIMES A DAY AS NEEDED FOR DIZZINESS FOR UP TO 3 DAYS  0     No current facility-administered medications for this visit.         Review of Systems   Constitution: Positive for weakness and malaise/fatigue. Negative for weight gain and weight loss.   HENT: Negative for hearing loss and hoarse voice.    Eyes: Negative for visual disturbance.   Cardiovascular: Negative for chest pain, claudication, cyanosis, dyspnea on exertion, irregular heartbeat, leg swelling, near-syncope, orthopnea, palpitations, paroxysmal nocturnal dyspnea and syncope.   Respiratory: Negative for cough, hemoptysis, shortness of breath, sleep disturbances due to breathing, snoring, sputum production and wheezing.    Endocrine: Negative for cold intolerance and heat intolerance.   Hematologic/Lymphatic: Negative.  Negative for bleeding problem. Does not bruise/bleed easily.   Skin: Negative.  Negative for rash.   Musculoskeletal: Positive for joint pain. Negative for arthritis, falls, muscle cramps and myalgias.   Gastrointestinal: Negative for abdominal pain, anorexia, change in bowel habit, constipation, diarrhea, dysphagia, heartburn, jaundice and nausea.   Genitourinary: Negative for frequency and nocturia.   Neurological: Negative for dizziness, focal weakness, headaches, light-headedness, numbness, tremors and vertigo.   Psychiatric/Behavioral: Negative for memory loss. The patient does not have insomnia and is not nervous/anxious.    Allergic/Immunologic: Negative for hives.       Labs                             No results found for: WBC, HGB, HCT, PLT, CHOL, TRIG, HDL, LDLDIRECT, TOTLDLC, LDLCALC, ALT, AST, NA, K, CL, CREATININE,  "BUN, CO2, TSH, INR, HGBA1C, BNP    No results found for: GLUCOSE, CALCIUM, NA, K, CO2, CL, BUN, CREATININE    Objective   Vitals:    05/16/19 1101 05/16/19 1104   BP:  132/80   BP Location:  Left upper arm   Patient Position:  Sitting   Pulse:  75   SpO2:  97%   Weight:  92.5 kg (204 lb)   Height: 1.6 m (5' 3\") 1.6 m (5' 3\")     Physical Exam   Constitutional: She is oriented to person, place, and time. She appears well-developed and well-nourished. No distress.   HENT:   Head: Normocephalic and atraumatic.   Nose: Nose normal.   Eyes: Conjunctivae are normal. No scleral icterus.   Neck: No JVD present.   Cardiovascular: Normal rate, regular rhythm, normal heart sounds and intact distal pulses.  Exam reveals no gallop and no friction rub.    No murmur heard.  2/6 systolic murmur   Pulmonary/Chest: Effort normal. No stridor. No respiratory distress. She has no wheezes. She has no rales. She exhibits no tenderness.   Abdominal: There is no tenderness.   Musculoskeletal: She exhibits no edema or deformity.   Uses a cane   Neurological: She is alert and oriented to person, place, and time.   Skin: Skin is warm and dry.   Psychiatric: She has a normal mood and affect.       Cardiac Procedures  Cardiac Procedures     Imaging  CAT SCAN     CTA 3/17 AOR ROOT 5.0 PULM NODULES NO [id number]     CTA 3/17 aorga 3.9 lll pulm nodules stable from 201`12     CAT scan October 2018 aortic root 4  Thyroid nodule  Right upper lobe tubular nodule  No change 2016     Cat scan head 5/19 small vessel disease      STRESS  pharm nuc neg 9/16        ECHO  Echo  Mac mild mr aortic root 4.2 aort sclerosis mild ai no 4/19 no change 2017       VASCULAR  Carotid 10/18 mild disease      ELECTROPHYSIOLOGY  SVT ablation 2006      EKG normal EKG no change  ]        Assessment/Plan:  Dizzy  She had an episode of dizziness midepigastric pain feeling off balance blood pressure was elevated for her at 160  ER evaluation including CAT scan head and abdomen " unremarkable    Coronary artery disease involving native coronary artery of native heart without angina pectoris  CAD based on calcification seen on remote CAT scan  Last ischemic evaluation was -September 2016 EKG is normal no suspicious symptoms echocardiogram normal April 2019  Probably will not repeat stress testing unless change in EKG or suspicious symptoms  She does not tolerate aspirin or statins we talked about PCSK9 inhibitors again today but she declined    Dilated aortic root (CMS/HCC) (HCC)  Last image October 2018 4.05 and a pulmonary nodule stable has been stable for several years recheck CAT scan October 2020 recheck echocardiogram April 2020    Pulmonary nodule  Chronic pulmonary nodule stable for many years will reimage when followed her aortic root size CAT scan due October 2000 o    Mixed hyperlipidemia  Intolerant to usual therapies we talked about PCSK9 inhibitors we have tried in the past money was an issue I brought it up again but she declines    SVT (supraventricular tachycardia) (CMS/HCC) (HCC)  She had ablation 2006    Gastroesophageal reflux disease without esophagitis  Chronic issue with abdominal pain recent CAT scan negative has been evaluated [facility] treated for GERD       Transient episode of dizziness and being off balance acute evaluation in the emergency room with CAT scan unremarkable  She has had carotid duplex in October 2018 nonobstructive disease  She will be following up with neurology Dr. Sacks for further evaluation   we talked about the PCSK9 inhibitors for her hyperlipidemia, but she declined at this time  She has a mildly dilated aortic root we have been following for years and subcentimeter pulmonary nodules are stable repeat noncontrast CAT scan October 2020  Echocardiogram April 2020  Follow-up in 6 months with lab work        Thank you for allowing me to participate in the care of this patient.  I hope this information is helpful.    Matias MUÑOZ  MD [facility]   5/16/2019  Cc Arpan Casillas

## 2019-05-16 NOTE — ASSESSMENT & PLAN NOTE
Intolerant to usual therapies we talked about PCSK9 inhibitors we have tried in the past money was an issue I brought it up again but she declines

## 2019-05-16 NOTE — ASSESSMENT & PLAN NOTE
Last image October 2018 4.05 and a pulmonary nodule stable has been stable for several years recheck CAT scan October 2020 recheck echocardiogram April 2020

## 2019-05-16 NOTE — ASSESSMENT & PLAN NOTE
CAD based on calcification seen on remote CAT scan  Last ischemic evaluation was -September 2016 EKG is normal no suspicious symptoms echocardiogram normal April 2019  Probably will not repeat stress testing unless change in EKG or suspicious symptoms  She does not tolerate aspirin or statins we talked about PCSK9 inhibitors again today but she declined

## 2019-05-16 NOTE — ASSESSMENT & PLAN NOTE
Pregnancy Precautions: Care Instructions  Your Care Instructions    There is no sure way to prevent labor before your due date ( labor) or to prevent most other pregnancy problems. But there are things you can do to increase your chances of a healthy pregnancy. Go to your appointments, follow your doctor's advice, and take good care of yourself. Eat well, and exercise (if your doctor agrees). And make sure to drink plenty of water. Follow-up care is a key part of your treatment and safety. Be sure to make and go to all appointments, and call your doctor if you are having problems. It's also a good idea to know your test results and keep a list of the medicines you take. How can you care for yourself at home? · Make sure you go to your prenatal appointments. At each visit, your doctor will check your blood pressure. Your doctor will also check to see if you have protein in your urine. High blood pressure and protein in urine are signs of preeclampsia. This condition can be dangerous for you and your baby. · Drink plenty of fluids, enough so that your urine is light yellow or clear like water. Dehydration can cause contractions. If you have kidney, heart, or liver disease and have to limit fluids, talk with your doctor before you increase the amount of fluids you drink. · Tell your doctor right away if you notice any symptoms of an infection, such as:  ¨ Burning when you urinate. ¨ A foul-smelling discharge from your vagina. ¨ Vaginal itching. ¨ Unexplained fever. ¨ Unusual pain or soreness in your uterus or lower belly. · Eat a balanced diet. Include plenty of foods that are high in calcium and iron. ¨ Foods high in calcium include milk, cheese, yogurt, almonds, and broccoli. ¨ Foods high in iron include red meat, shellfish, poultry, eggs, beans, raisins, whole-grain bread, and leafy green vegetables. · Do not smoke.  If you need help quitting, talk to your doctor about stop-smoking programs She had ablation 2006   and medicines. These can increase your chances of quitting for good. · Do not drink alcohol or use illegal drugs. · Follow your doctor's directions about activity. Your doctor will let you know how much, if any, exercise you can do. · Ask your doctor if you can have sex. If you are at risk for early labor, your doctor may ask you to not have sex. · Take care to prevent falls. During pregnancy, your joints are loose, and your balance is off. Sports such as bicycling, skiing, or in-line skating can increase your risk of falling. And don't ride horses or motorcycles, dive, water ski, scuba dive, or parachute jump while you are pregnant. · Avoid getting very hot. Do not use saunas or hot tubs. Avoid staying out in the sun in hot weather for long periods. Take acetaminophen (Tylenol) to lower a high fever. · Do not take any over-the-counter or herbal medicines or supplements without talking to your doctor or pharmacist first.  When should you call for help? Call 911 anytime you think you may need emergency care. For example, call if:    · You passed out (lost consciousness).     · You have severe vaginal bleeding.     · You have severe pain in your belly or pelvis.     · You have had fluid gushing or leaking from your vagina and you know or think the umbilical cord is bulging into your vagina. If this happens, immediately get down on your knees so your rear end (buttocks) is higher than your head. This will decrease the pressure on the cord until help arrives.   [facility] your doctor now or seek immediate medical care if:    · You have signs of preeclampsia, such as:  ¨ Sudden swelling of your face, hands, or feet. ¨ New vision problems (such as dimness or blurring). ¨ A severe headache.     · You have any vaginal bleeding.     · You have belly pain or cramping.     · You have a fever.     · You have had regular contractions (with or without pain) for an hour.  This means that you have 8 or more within 1 hour or 4 or more in 20 minutes after you change your position and drink fluids.     · You have a sudden release of fluid from your vagina.     · You have low back pain or pelvic pressure that does not go away.     · You notice that your baby has stopped moving or is moving much less than normal.    Watch closely for changes in your health, and be sure to contact your doctor if you have any problems. Where can you learn more? Go to http://fredi-desean.info/. Enter [id number] in the search box to learn more about \"Pregnancy Precautions: Care Instructions. \"  Current as of: November 21, 2017  Content Version: 11.7  © 3751-4032 [employer]. Care instructions adapted under license by [employer] (which disclaims liability or warranty for this information). If you have questions about a medical condition or this instruction, always ask your healthcare professional. [address] any warranty or liability for your use of this information.

## 2019-05-16 NOTE — ASSESSMENT & PLAN NOTE
Chronic pulmonary nodule stable for many years will reimage when followed her aortic root size CAT scan due October 2000 o

## 2019-05-16 NOTE — ASSESSMENT & PLAN NOTE
She had an episode of dizziness midepigastric pain feeling off balance blood pressure was elevated for her at 160  ER evaluation including CAT scan head and abdomen unremarkable

## 2019-09-27 ENCOUNTER — TELEPHONE (OUTPATIENT)
Dept: SCHEDULING | Facility: CLINIC | Age: 84
End: 2019-09-27

## 2019-09-27 DIAGNOSIS — I77.810 DILATED AORTIC ROOT (CMS/HCC): Primary | ICD-10-CM

## 2019-09-27 NOTE — TELEPHONE ENCOUNTER
Nina, from [facility] needs clarity with script for CT Chest. Nina is unsure whether Dr. Willard wants CT Angio Chest with and without IV contrast or just without. Does she want the unenhancement of the chest? Nina can be reach at     P: [phone]  F:[phone]

## 2019-09-27 NOTE — TELEPHONE ENCOUNTER
CT chest without IV contrast reordered with post processing for dilated aortic root for jared Ewing.

## 2019-10-01 ENCOUNTER — TELEPHONE (OUTPATIENT)
Dept: CARDIOLOGY | Facility: CLINIC | Age: 84
End: 2019-10-01

## 2019-10-01 NOTE — TELEPHONE ENCOUNTER
lmom about cat scan and follow up   Mild inlflmation and aorta stable  Tole her will discuss at visit in weeks

## 2019-10-08 ASSESSMENT — ENCOUNTER SYMPTOMS
HEMATOLOGIC/LYMPHATIC NEGATIVE: 1
HOARSE VOICE: 0
ORTHOPNEA: 0
INSOMNIA: 0
HEARTBURN: 0
DIZZINESS: 0
SYNCOPE: 0
FALLS: 0
ANOREXIA: 0
MEMORY LOSS: 0
TREMORS: 0
CONSTIPATION: 0
DYSPNEA ON EXERTION: 0
COUGH: 0
NAUSEA: 0
SLEEP DISTURBANCES DUE TO BREATHING: 0
FREQUENCY: 0
LIGHT-HEADEDNESS: 0
ABDOMINAL PAIN: 0
HEADACHES: 0
BRUISES/BLEEDS EASILY: 0
WHEEZING: 0
PALPITATIONS: 0
NEAR-SYNCOPE: 0
WEAKNESS: 1
VERTIGO: 0
SHORTNESS OF BREATH: 0
JAUNDICE: 0
MYALGIAS: 0
FOCAL WEAKNESS: 0
WEIGHT LOSS: 0
PND: 0
WEIGHT GAIN: 0
CHANGE IN BOWEL HABIT: 0
CLAUDICATION: 0
SNORING: 0
NUMBNESS: 0
SPUTUM PRODUCTION: 0
DIARRHEA: 0
MUSCLE CRAMPS: 0
NERVOUS/ANXIOUS: 0
IRREGULAR HEARTBEAT: 0
HEMOPTYSIS: 0

## 2019-10-08 NOTE — PROGRESS NOTES
Cardiology Note    Arpan Casillas Forrest is a 86 y.o. female 5/24/1933       She returns for follow-up and to review her lab work  She is a history of coronary artery disease based on calcification seen on remote CAT scan  Last ischemic evaluation, pharmacologic nuclear stress test was negative in 2016 and she had normal   echocardiogram in April 2019  She had SVT ablation 2006  She has mildly dilated aortic root of 4  Subcentimeter pulmonary nodules been stable for past year  Repeat CAT scan was done [facility] September 2019 aortic root stable for questionable infectious process right lower lobe tree-in-bud abnormality  Mixed hyperlipidemia    And GERD  Her other complaint is intermittent tingling of her hands                     Patient Active Problem List    Diagnosis Date Noted   • Paresthesias 10/14/2019   • Dizzy 05/16/2019   • Gastroesophageal reflux disease without esophagitis 05/16/2019   • Visual changes 10/08/2018   • Arthritis 04/09/2018   • Mixed hyperlipidemia 04/01/2018   • Essential hypertension 04/01/2018   • Coronary artery disease involving native coronary artery of native heart without angina pectoris 04/01/2018   • Dilated aortic root (CMS/HCC) 04/01/2018   • SVT (supraventricular tachycardia) (CMS/HCC) 04/01/2018   • Pulmonary nodule 04/01/2018   • RBBB 04/01/2018   • MVP (mitral valve prolapse) 04/01/2018       Allergy  Adenosine; Anesthesia s/i-40  [propofol]; Cephalosporins; Desvenlafaxine succinate; Dexbrompheniramine; Diltiazem; Escitalopram oxalate; Ezetimibe; Fluoxetine hcl; Lactase; Metoprolol; Mirtazapine; Penicillins; Plant stanol leonila; Sertraline hcl; Statins-hmg-coa reductase inhibitors; and Sulfa (sulfonamide antibiotics)    MED LIST     Current Outpatient Medications   Medication Sig Dispense Refill   • acetaminophen (TYLENOL EXTRA STRENGTH ORAL) Take by mouth once daily.     • cholecalciferol, vitamin D3, (cholecalciferol) 1,000 unit tablet Take  1 tablet by mouth daily.     • CYANOCOBALAMIN/FOLIC ACID (VITAMIN B12-FOLIC ACID) 500-400 mcg tablet Take 1 tablet by mouth daily.     • diclofenac sodium (VOLTAREN) 1 % topical gel Apply topically 2 (two) times a day as needed for mild pain.     • flaxseed oil 1,000 mg capsule Take 1 capsule by mouth as needed.       • folic acid (FOLVITE) 400 mcg tablet Take 1 tablet by mouth daily.     • Lactobac no.41-Bifidobact no.7 (PROBIOTIC-10) 70 mg (3 billion cell) capsule Take 1 capsule by mouth daily.     • lansoprazole (PREVACID SOLUTAB) 30 mg disintegrating tablet Take 30 mg by mouth daily.     • LORazepam (ATIVAN) 0.5 mg tablet Take 0.5 mg by mouth nightly as needed for anxiety.     • multivit-min-FA-lycopen-lutein (CENTRUM SILVER) tablet Take 1 tablet by mouth daily.     • polyvinyl alcohol-povidon,PF, (REFRESH CLASSIC, PF,) 1.4-0.6 % dropperette Take 1 drop by mouth 4 (four) times a day as needed.     • potassium gluconate 595 mg (99 mg) tablet Take 1 tablet by mouth daily.     • simethicone (GAS RELIEF) 125 mg capsule Take 125 mg by mouth daily.     • simethicone (MYLANTA GAS ORAL) Take by mouth.     • thiamine (vitamin B-1) 50 mg tablet Take 50 mg by mouth daily.     • vitamin E 400 unit capsule Take 1 tablet by mouth daily.     • acetaminophen (TYLENOL ARTHRITIS PAIN) 650 mg 8 hr tablet Take 650 mg by mouth every 8 (eight) hours as needed for mild pain.       No current facility-administered medications for this visit.         Review of Systems   Constitution: Positive for weakness and malaise/fatigue. Negative for weight gain and weight loss.   HENT: Negative for hearing loss and hoarse voice.    Eyes: Negative for visual disturbance.   Cardiovascular: Negative for chest pain, claudication, cyanosis, dyspnea on exertion, irregular heartbeat, leg swelling, near-syncope, orthopnea, palpitations, paroxysmal nocturnal dyspnea and syncope.   Respiratory: Negative for cough, hemoptysis, shortness of breath, sleep  "disturbances due to breathing, snoring, sputum production and wheezing.    Endocrine: Negative for cold intolerance and heat intolerance.   Hematologic/Lymphatic: Negative.  Negative for bleeding problem. Does not bruise/bleed easily.   Skin: Negative.  Negative for rash.   Musculoskeletal: Positive for joint pain. Negative for arthritis, falls, muscle cramps and myalgias.   Gastrointestinal: Negative for abdominal pain, anorexia, change in bowel habit, constipation, diarrhea, dysphagia, heartburn, jaundice and nausea.   Genitourinary: Negative for frequency and nocturia.   Neurological: Positive for weakness. Negative for dizziness, focal weakness, headaches, light-headedness, numbness, tremors and vertigo.        Bilateral hand tingling   Psychiatric/Behavioral: Negative for memory loss. The patient does not have insomnia and is not nervous/anxious.    Allergic/Immunologic: Negative for hives.       Labs sept 2019                            No results found for: WBC, HGB, HCT, PLT, CHOL, TRIG, HDL, LDLDIRECT, TOTLDLC, LDLCALC, ALT, AST, NA, K, CL, CREATININE, BUN, CO2, TSH, INR, HGBA1C, BNP    No results found for: GLUCOSE, CALCIUM, NA, K, CO2, CL, BUN, CREATININE    Objective   Vitals:    10/14/19 0905   BP: 118/78   Pulse: 80   SpO2: 95%   Weight: 94.3 kg (208 lb)   Height: 1.6 m (5' 3\")     Physical Exam   Constitutional: She is oriented to person, place, and time. She appears well-developed and well-nourished. No distress.   HENT:   Head: Normocephalic and atraumatic.   Nose: Nose normal.   Eyes: Conjunctivae are normal. No scleral icterus.   Neck: No JVD present.   Cardiovascular: Normal rate, regular rhythm, normal heart sounds and intact distal pulses.  Exam reveals no gallop and no friction rub.    No murmur heard.  2/6 systolic murmur   Pulmonary/Chest: Effort normal. No stridor. No respiratory distress. She has no wheezes. She has no rales. She exhibits no tenderness.   Abdominal: There is no tenderness. "   Musculoskeletal: She exhibits no edema or deformity.   Uses a cane   Neurological: She is alert and oriented to person, place, and time.   Skin: Skin is warm and dry.   Psychiatric: She has a normal mood and affect.       Cardiac Procedures  Cardiac Procedures     Imaging  CAT SCAN     CTA 3/17 AOR ROOT 5.0 PULM NODULES NO [id number]     CTA 3/17 aorga 3.9 lll pulm nodules stable from 201`12     CAT scan October 2018 aortic root 4  Thyroid nodule  Right upper lobe tubular nodule  No change 2016     Cat scan chest 9/19 rll infection? Small nodules aorta 4.      Cat scan head 5/19 small vessel disease      STRESS  pharm nuc neg 9/16        ECHO  Echo  Mac mild mr aortic root 4.2 aort sclerosis mild ai no 4/19 no change 2017       VASCULAR  Carotid 10/18 mild disease      CAT SCAN  Cat scan9/19 aorta 4 rll inflammation    ELECTROPHYSIOLOGY  SVT ablation 2006      EKG normal EKG noRBBBNO CHANGE change  ]        Assessment/Plan:  Coronary artery disease involving native coronary artery of native heart without angina pectoris  CAD diagnosed by calcification seen on CAT scan in the past last ischemic evaluation negative pharmacologic nuclear stress September 2016 echocardiogram   April 2019 aortic root stable 4.2 preserved LV systolic function aortic sclerosis mild aortic insufficiency  No suspicious symptoms  She unfortunately does not tolerate aspirin or nonsteroidals statins    Dilated aortic root (CMS/HCC)  4.0 by CAT scan September 2019 4.2 and echocardiogram question about inflammation of the CAT scan repeat earlier than usual 6 months time with echo at next visit aortic root is been stable for several years    SVT (supraventricular tachycardia) (CMS/HCC) (HCC)  Ablation 2006 no clinical recurrence    Mixed hyperlipidemia  Cholesterol was horrible  intolerant to statins and Zetia we talked about PCSK9 inhibitors she declines    Pulmonary nodule  This CAT scan from September aortic root stable 4.0 right lower  lobe inflammation recommend a repeat study will do in 6 months not clinically ill aortic root size been stable from 2012    Paresthesias  Bilateral hand numbness suspect related to carpal tunnel problems with her neck she will follow-up with neurology    Gastroesophageal reflux disease without esophagitis  Chronic medication follows at [facility]       CAT scan done September 2019 aortic root stable at 4 minimal inflammation right lower lobe they suggested repeat study will do it in 6 months  Follow-up at that time with resting echocardiogram  She always has hyperlipidemia intolerant to Zetia and statins declines newer injectable agents  I will see her back after noncontrast CAT scan and echocardiogram follow-up on her aortic root      Thank you for allowing me to participate in the care of this patient.  I hope this information is helpful.    Matias Willard MD [facility]   10/14/2019  Arpan Sethi

## 2019-10-14 ENCOUNTER — OFFICE VISIT (OUTPATIENT)
Dept: CARDIOLOGY | Facility: CLINIC | Age: 84
End: 2019-10-14
Payer: MEDICARE

## 2019-10-14 VITALS
OXYGEN SATURATION: 95 % | HEIGHT: 63 IN | HEART RATE: 80 BPM | BODY MASS INDEX: 36.86 KG/M2 | WEIGHT: 208 LBS | DIASTOLIC BLOOD PRESSURE: 78 MMHG | SYSTOLIC BLOOD PRESSURE: 118 MMHG

## 2019-10-14 DIAGNOSIS — I34.1 MVP (MITRAL VALVE PROLAPSE): ICD-10-CM

## 2019-10-14 DIAGNOSIS — I10 ESSENTIAL HYPERTENSION: ICD-10-CM

## 2019-10-14 DIAGNOSIS — E78.2 MIXED HYPERLIPIDEMIA: Primary | ICD-10-CM

## 2019-10-14 DIAGNOSIS — I77.810 DILATED AORTIC ROOT (CMS/HCC): ICD-10-CM

## 2019-10-14 DIAGNOSIS — I25.10 CORONARY ARTERY DISEASE INVOLVING NATIVE CORONARY ARTERY OF NATIVE HEART WITHOUT ANGINA PECTORIS: ICD-10-CM

## 2019-10-14 DIAGNOSIS — I45.10 RBBB: ICD-10-CM

## 2019-10-14 DIAGNOSIS — R20.2 PARESTHESIAS: ICD-10-CM

## 2019-10-14 DIAGNOSIS — M19.90 ARTHRITIS: ICD-10-CM

## 2019-10-14 DIAGNOSIS — I47.10 SVT (SUPRAVENTRICULAR TACHYCARDIA) (CMS/HCC): ICD-10-CM

## 2019-10-14 DIAGNOSIS — R91.8 PULMONARY NODULES: ICD-10-CM

## 2019-10-14 PROCEDURE — 93000 ELECTROCARDIOGRAM COMPLETE: CPT | Performed by: INTERNAL MEDICINE

## 2019-10-14 PROCEDURE — 99214 OFFICE O/P EST MOD 30 MIN: CPT | Performed by: INTERNAL MEDICINE

## 2019-10-14 NOTE — ASSESSMENT & PLAN NOTE
Cholesterol was horrible  intolerant to statins and Travis we talked about PCSK9 inhibitors she declines

## 2019-10-14 NOTE — ASSESSMENT & PLAN NOTE
CAD diagnosed by calcification seen on CAT scan in the past last ischemic evaluation negative pharmacologic nuclear stress September 2016 echocardiogram   April 2019 aortic root stable 4.2 preserved LV systolic function aortic sclerosis mild aortic insufficiency  No suspicious symptoms  She unfortunately does not tolerate aspirin or nonsteroidals statins

## 2019-10-14 NOTE — LETTER
October 14, 2019     Arpan Casillas  [address]    [facility] [address]    Patient: Kathy Clayton  YOB: 1933  Date of Visit: 10/14/2019      Dear Tino    Thank you for referring Kathy Clayton to me for evaluation. Below are my notes for this consultation.    If you have questions, please do not hesitate to call me. I look forward to following your patient along with you.         Sincerely,        Matias Willard MD        CC: Jill Schneider, MD Stephen E Sacks, DO William H Lipshutz, MD Becker, Matias LIND MD  10/14/2019 10:50 AM  Sign at close encounter      Cardiology Note    Arpan Casillas is a 86 y.o. female 5/24/1933       She returns for follow-up and to review her lab work  She is a history of coronary artery disease based on calcification seen on remote CAT scan  Last ischemic evaluation, pharmacologic nuclear stress test was negative in 2016 and she had normal   echocardiogram in April 2019  She had SVT ablation 2006  She has mildly dilated aortic root of 4  Subcentimeter pulmonary nodules been stable for past year  Repeat CAT scan was done [facility] September 2019 aortic root stable for questionable infectious process right lower lobe tree-in-bud abnormality  Mixed hyperlipidemia    And GERD  Her other complaint is intermittent tingling of her hands                     Patient Active Problem List    Diagnosis Date Noted   • Paresthesias 10/14/2019   • Dizzy 05/16/2019   • Gastroesophageal reflux disease without esophagitis 05/16/2019   • Visual changes 10/08/2018   • Arthritis 04/09/2018   • Mixed hyperlipidemia 04/01/2018   • Essential hypertension 04/01/2018   • Coronary artery disease involving native coronary artery of native heart without angina pectoris 04/01/2018   • Dilated aortic root (CMS/HCC) 04/01/2018   • SVT (supraventricular tachycardia) (CMS/HCC) 04/01/2018   • Pulmonary nodule 04/01/2018   • RBBB 04/01/2018   • MVP  (mitral valve prolapse) 04/01/2018       Allergy  Adenosine; Anesthesia s/i-40  [propofol]; Cephalosporins; Desvenlafaxine succinate; Dexbrompheniramine; Diltiazem; Escitalopram oxalate; Ezetimibe; Fluoxetine hcl; Lactase; Metoprolol; Mirtazapine; Penicillins; Plant stanol leonila; Sertraline hcl; Statins-hmg-coa reductase inhibitors; and Sulfa (sulfonamide antibiotics)    MED LIST     Current Outpatient Medications   Medication Sig Dispense Refill   • acetaminophen (TYLENOL EXTRA STRENGTH ORAL) Take by mouth once daily.     • cholecalciferol, vitamin D3, (cholecalciferol) 1,000 unit tablet Take 1 tablet by mouth daily.     • CYANOCOBALAMIN/FOLIC ACID (VITAMIN B12-FOLIC ACID) 500-400 mcg tablet Take 1 tablet by mouth daily.     • diclofenac sodium (VOLTAREN) 1 % topical gel Apply topically 2 (two) times a day as needed for mild pain.     • flaxseed oil 1,000 mg capsule Take 1 capsule by mouth as needed.       • folic acid (FOLVITE) 400 mcg tablet Take 1 tablet by mouth daily.     • Lactobac no.41-Bifidobact no.7 (PROBIOTIC-10) 70 mg (3 billion cell) capsule Take 1 capsule by mouth daily.     • lansoprazole (PREVACID SOLUTAB) 30 mg disintegrating tablet Take 30 mg by mouth daily.     • LORazepam (ATIVAN) 0.5 mg tablet Take 0.5 mg by mouth nightly as needed for anxiety.     • multivit-min-FA-lycopen-lutein (CENTRUM SILVER) tablet Take 1 tablet by mouth daily.     • polyvinyl alcohol-povidon,PF, (REFRESH CLASSIC, PF,) 1.4-0.6 % dropperette Take 1 drop by mouth 4 (four) times a day as needed.     • potassium gluconate 595 mg (99 mg) tablet Take 1 tablet by mouth daily.     • simethicone (GAS RELIEF) 125 mg capsule Take 125 mg by mouth daily.     • simethicone (MYLANTA GAS ORAL) Take by mouth.     • thiamine (vitamin B-1) 50 mg tablet Take 50 mg by mouth daily.     • vitamin E 400 unit capsule Take 1 tablet by mouth daily.     • acetaminophen (TYLENOL ARTHRITIS PAIN) 650 mg 8 hr tablet Take 650 mg by mouth every 8 (eight)  "hours as needed for mild pain.       No current facility-administered medications for this visit.         Review of Systems   Constitution: Positive for weakness and malaise/fatigue. Negative for weight gain and weight loss.   HENT: Negative for hearing loss and hoarse voice.    Eyes: Negative for visual disturbance.   Cardiovascular: Negative for chest pain, claudication, cyanosis, dyspnea on exertion, irregular heartbeat, leg swelling, near-syncope, orthopnea, palpitations, paroxysmal nocturnal dyspnea and syncope.   Respiratory: Negative for cough, hemoptysis, shortness of breath, sleep disturbances due to breathing, snoring, sputum production and wheezing.    Endocrine: Negative for cold intolerance and heat intolerance.   Hematologic/Lymphatic: Negative.  Negative for bleeding problem. Does not bruise/bleed easily.   Skin: Negative.  Negative for rash.   Musculoskeletal: Positive for joint pain. Negative for arthritis, falls, muscle cramps and myalgias.   Gastrointestinal: Negative for abdominal pain, anorexia, change in bowel habit, constipation, diarrhea, dysphagia, heartburn, jaundice and nausea.   Genitourinary: Negative for frequency and nocturia.   Neurological: Positive for weakness. Negative for dizziness, focal weakness, headaches, light-headedness, numbness, tremors and vertigo.        Bilateral hand tingling   Psychiatric/Behavioral: Negative for memory loss. The patient does not have insomnia and is not nervous/anxious.    Allergic/Immunologic: Negative for hives.       Labs sept 2019                            No results found for: WBC, HGB, HCT, PLT, CHOL, TRIG, HDL, LDLDIRECT, TOTLDLC, LDLCALC, ALT, AST, NA, K, CL, CREATININE, BUN, CO2, TSH, INR, HGBA1C, BNP    No results found for: GLUCOSE, CALCIUM, NA, K, CO2, CL, BUN, CREATININE    Objective   Vitals:    10/14/19 0905   BP: 118/78   Pulse: 80   SpO2: 95%   Weight: 94.3 kg (208 lb)   Height: 1.6 m (5' 3\")     Physical Exam   Constitutional: She " is oriented to person, place, and time. She appears well-developed and well-nourished. No distress.   HENT:   Head: Normocephalic and atraumatic.   Nose: Nose normal.   Eyes: Conjunctivae are normal. No scleral icterus.   Neck: No JVD present.   Cardiovascular: Normal rate, regular rhythm, normal heart sounds and intact distal pulses.  Exam reveals no gallop and no friction rub.    No murmur heard.  2/6 systolic murmur   Pulmonary/Chest: Effort normal. No stridor. No respiratory distress. She has no wheezes. She has no rales. She exhibits no tenderness.   Abdominal: There is no tenderness.   Musculoskeletal: She exhibits no edema or deformity.   Uses a cane   Neurological: She is alert and oriented to person, place, and time.   Skin: Skin is warm and dry.   Psychiatric: She has a normal mood and affect.       Cardiac Procedures  Cardiac Procedures     Imaging  CAT SCAN     CTA 3/17 AOR ROOT 5.0 PULM NODULES NO [id number]     CTA 3/17 aorga 3.9 lll pulm nodules stable from 201`12     CAT scan October 2018 aortic root 4  Thyroid nodule  Right upper lobe tubular nodule  No change 2016     Cat scan chest 9/19 rll infection? Small nodules aorta 4.      Cat scan head 5/19 small vessel disease      STRESS  pharm nuc neg 9/16        ECHO  Echo  Mac mild mr aortic root 4.2 aort sclerosis mild ai no 4/19 no change 2017       VASCULAR  Carotid 10/18 mild disease      CAT SCAN  Cat scan9/19 aorta 4 rll inflammation    ELECTROPHYSIOLOGY  SVT ablation 2006      EKG normal EKG [id number] CHANGE change  ]        Assessment/Plan:  Coronary artery disease involving native coronary artery of native heart without angina pectoris  CAD diagnosed by calcification seen on CAT scan in the past last ischemic evaluation negative pharmacologic nuclear stress September 2016 echocardiogram   April 2019 aortic root stable 4.2 preserved LV systolic function aortic sclerosis mild aortic insufficiency  No suspicious symptoms  She unfortunately does  not tolerate aspirin or nonsteroidals statins    Dilated aortic root (CMS/HCC)  4.0 by CAT scan September 2019 4.2 and echocardiogram question about inflammation of the CAT scan repeat earlier than usual 6 months time with echo at next visit aortic root is been stable for several years    SVT (supraventricular tachycardia) (CMS/HCC) (HCC)  Ablation 2006 no clinical recurrence    Mixed hyperlipidemia  Cholesterol was horrible  intolerant to statins and Zetia we talked about PCSK9 inhibitors she declines    Pulmonary nodule  This CAT scan from September aortic root stable 4.0 right lower lobe inflammation recommend a repeat study will do in 6 months not clinically ill aortic root size been stable from 2012    Paresthesias  Bilateral hand numbness suspect related to carpal tunnel problems with her neck she will follow-up with neurology    Gastroesophageal reflux disease without esophagitis  Chronic [facility]       CAT scan done September 2019 aortic root stable at 4 minimal inflammation right lower lobe they suggested repeat study will do it in 6 months  Follow-up at that time with resting echocardiogram  She always has hyperlipidemia intolerant to Zetia and statins declines newer injectable agents  I will see her back after noncontrast CAT scan and echocardiogram follow-up on her aortic root      Thank you for allowing me to participate in the care of this patient.  I hope this information is helpful.    Matias Willard MD [facility]   10/14/2019  Arpan Sethi

## 2019-10-14 NOTE — ASSESSMENT & PLAN NOTE
4.0 by CAT scan September 2019 4.2 and echocardiogram question about inflammation of the CAT scan repeat earlier than usual 6 months time with echo at next visit aortic root is been stable for several years

## 2019-10-14 NOTE — ASSESSMENT & PLAN NOTE
This CAT scan from September aortic root stable 4.0 right lower lobe inflammation recommend a repeat study will do in 6 months not clinically ill aortic root size been stable from 2012

## 2019-10-14 NOTE — ASSESSMENT & PLAN NOTE
Bilateral hand numbness suspect related to carpal tunnel problems with her neck she will follow-up with neurology

## 2020-04-20 ENCOUNTER — TELEPHONE (OUTPATIENT)
Dept: CARDIOLOGY | Facility: CLINIC | Age: 85
End: 2020-04-20

## 2020-04-20 NOTE — TELEPHONE ENCOUNTER
I called pt to confirm Echo/OV -- 4/27/2020- reschedule.    Per Dr. Willard,due to COVID-19,offer pt a telemed visit to have any problems,questions, concerns or lab results addressed. If not,then reschedule testing and OV. If labs ordered for future appt, pt will need to have done prior.    Pt declined telemed- audio and video.    Pt is reschedule to 6/8/2020

## 2020-05-21 ENCOUNTER — TELEPHONE (OUTPATIENT)
Dept: SCHEDULING | Facility: CLINIC | Age: 85
End: 2020-05-21

## 2020-05-21 NOTE — TELEPHONE ENCOUNTER
Pt uncomfortable going out for labs and ct prior to 6/8 appt.  Would like call back from Dr Willard to discuss. [phone]

## 2020-05-21 NOTE — TELEPHONE ENCOUNTER
Called patient, informed her that Dr Willard is out of the office til next Tuesday. Will have Dr Willard review and give further instructions when she returns. Pt understood.

## 2020-05-26 NOTE — TELEPHONE ENCOUNTER
LMOM both phone lines, per Dr. Willard it is ok to keep ov and echo appts, 06-, without having the labs done.

## 2020-05-26 NOTE — TELEPHONE ENCOUNTER
Pt called me. She is unsure what she would like to do. I reviewed testing availability for Jose/[facility]. She will speak with her dgt and call me back.

## 2020-06-04 ASSESSMENT — ENCOUNTER SYMPTOMS
SPUTUM PRODUCTION: 0
HEMOPTYSIS: 0
WEAKNESS: 1
SNORING: 0
COUGH: 0
HEADACHES: 0
ANOREXIA: 0
HOARSE VOICE: 0
HEMATOLOGIC/LYMPHATIC NEGATIVE: 1
MYALGIAS: 0
BRUISES/BLEEDS EASILY: 0
VERTIGO: 0
WEIGHT GAIN: 0
TREMORS: 0
HEARTBURN: 0
CHANGE IN BOWEL HABIT: 0
FALLS: 0
LIGHT-HEADEDNESS: 0
MEMORY LOSS: 0
NEAR-SYNCOPE: 0
WEIGHT LOSS: 0
JAUNDICE: 0
CONSTIPATION: 0
WHEEZING: 0
SHORTNESS OF BREATH: 0
NAUSEA: 0
FOCAL WEAKNESS: 0
IRREGULAR HEARTBEAT: 0
NERVOUS/ANXIOUS: 0
FREQUENCY: 0
PALPITATIONS: 0
NUMBNESS: 0
MUSCLE CRAMPS: 0
CLAUDICATION: 0
DIARRHEA: 0
DYSPNEA ON EXERTION: 0
SYNCOPE: 0
ORTHOPNEA: 0
INSOMNIA: 0
SLEEP DISTURBANCES DUE TO BREATHING: 0
PND: 0
DIZZINESS: 0
ABDOMINAL PAIN: 0

## 2020-06-04 NOTE — PROGRESS NOTES
Cardiology Note    Arpan Casillas DO Lillian E Forrest is a 87 y.o. female 5/24/1933         She returns for follow-up and echocardiogram  She has a diagnosis of CAD made by calcification seen on CAT scan in the past her last ischemic evaluation was a negative pharmacologic test in 2016  He is followed for.aortic root of 4.2 cm mild aortic insufficiency last CAT scan was April 2019    Echocardiogram today showed June 2020  Aortic root size 4.1 cm aortic root index 2 cm/m² preserved LV systolic function mild aortic and mitral insufficiency mitral annular calcification    She had SVT ablation in 2006  Subcentimeter pulmonary nodules mixed hyperlipidemia chronic GERD paresthesias she had abnormality on her CAT scan September 2019 suggesting this was done [facility]  right lower lobe inflammation they suggested follow-up imaging aortic root was stable at 4 cm  She is delayed getting her imaging and lab work because of COVID      She takes no prescription medication she has multiple side effects and is afraid to start anything              Electronically signed by Matias Willard MD at 10/14/2019 10:52 AM       Office Visit on 10/14/2019                        She returns for follow-up and to review her lab work  She is a history of coronary artery disease based on calcification seen on remote CAT scan  Last ischemic evaluation, pharmacologic nuclear stress test was negative in 2016 and she had normal   echocardiogram in April 2019  She had SVT ablation 2006  She has mildly dilated aortic root of 4  Subcentimeter pulmonary nodules been stable for past year  Repeat CAT scan was done [facility] September 2019 aortic root stable for questionable infectious process right lower lobe tree-in-bud abnormality  Mixed hyperlipidemia    And GERD  Her other complaint is intermittent tingling of her hands                     Patient Active Problem List    Diagnosis Date Noted   •  Paresthesias 10/14/2019   • Dizzy 05/16/2019   • Gastroesophageal reflux disease without esophagitis 05/16/2019   • Visual changes 10/08/2018   • Arthritis 04/09/2018   • Mixed hyperlipidemia 04/01/2018   • Essential hypertension 04/01/2018   • Coronary artery disease involving native coronary artery of native heart without angina pectoris 04/01/2018   • Dilated aortic root (CMS/HCC) 04/01/2018   • SVT (supraventricular tachycardia) (CMS/HCC) 04/01/2018   • Pulmonary nodule 04/01/2018   • RBBB 04/01/2018   • MVP (mitral valve prolapse) 04/01/2018       Allergy  Adenosine; Anesthesia s/i-40  [propofol]; Cephalosporins; Desvenlafaxine succinate; Dexbrompheniramine; Diltiazem; Escitalopram oxalate; Ezetimibe; Fluoxetine hcl; Lactase; Metoprolol; Mirtazapine; Penicillins; Plant stanol leonila; Sertraline hcl; Statins-hmg-coa reductase inhibitors; and Sulfa (sulfonamide antibiotics)    MED LIST     Current Outpatient Medications   Medication Sig Dispense Refill   • acetaminophen (TYLENOL EXTRA STRENGTH ORAL) Take by mouth once daily.     • cholecalciferol, vitamin D3, (cholecalciferol) 1,000 unit tablet Take 1 tablet by mouth daily.     • CYANOCOBALAMIN/FOLIC ACID (VITAMIN B12-FOLIC ACID) 500-400 mcg tablet Take 1 tablet by mouth daily.     • diclofenac sodium (VOLTAREN) 1 % topical gel Apply topically 2 (two) times a day as needed for mild pain.     • flaxseed oil 1,000 mg capsule Take 1 capsule by mouth as needed.       • folic acid (FOLVITE) 400 mcg tablet Take 1 tablet by mouth daily.     • Lactobac no.41-Bifidobact no.7 (PROBIOTIC-10) 70 mg (3 billion cell) capsule Take 1 capsule by mouth daily.     • lansoprazole (PREVACID SOLUTAB) 30 mg disintegrating tablet Take 30 mg by mouth daily.     • LORazepam (ATIVAN) 0.5 mg tablet Take 0.5 mg by mouth nightly as needed for anxiety.     • magnesium hydroxide (MILK OF MAGNESIA CONCENTRATED ORAL) Take by mouth once.     • multivit-min-FA-lycopen-lutein (CENTRUM SILVER) tablet  Take 1 tablet by mouth daily.     • polyvinyl alcohol-povidon,PF, (REFRESH CLASSIC, PF,) 1.4-0.6 % dropperette Take 1 drop by mouth 4 (four) times a day as needed.     • potassium gluconate 595 mg (99 mg) tablet Take 1 tablet by mouth daily.     • simethicone (GAS RELIEF) 125 mg capsule Take 125 mg by mouth daily.     • simethicone (MYLANTA GAS ORAL) Take by mouth.     • thiamine (vitamin B-1) 50 mg tablet Take 50 mg by mouth daily.     • vitamin E 400 unit capsule Take 1 tablet by mouth daily.       No current facility-administered medications for this visit.         Review of Systems   Constitution: Positive for malaise/fatigue. Negative for weight gain and weight loss.   HENT: Negative for hearing loss and hoarse voice.    Eyes: Negative for visual disturbance.   Cardiovascular: Negative for chest pain, claudication, cyanosis, dyspnea on exertion, irregular heartbeat, leg swelling, near-syncope, orthopnea, palpitations, paroxysmal nocturnal dyspnea and syncope.   Respiratory: Negative for cough, hemoptysis, shortness of breath, sleep disturbances due to breathing, snoring, sputum production and wheezing.    Endocrine: Negative for cold intolerance and heat intolerance.   Hematologic/Lymphatic: Negative.  Negative for bleeding problem. Does not bruise/bleed easily.   Skin: Negative.  Negative for rash.   Musculoskeletal: Positive for joint pain. Negative for arthritis, falls, muscle cramps and myalgias.   Gastrointestinal: Negative for abdominal pain, anorexia, change in bowel habit, constipation, diarrhea, dysphagia, heartburn, jaundice and nausea.   Genitourinary: Negative for frequency and nocturia.   Neurological: Positive for weakness. Negative for dizziness, focal weakness, headaches, light-headedness, numbness, tremors and vertigo.        Bilateral hand tingling   Psychiatric/Behavioral: Negative for memory loss. The patient does not have insomnia and is not nervous/anxious.    Allergic/Immunologic:  "Negative for hives.       Labs sept 2019                            No results found for: WBC, HGB, HCT, PLT, CHOL, TRIG, HDL, LDLDIRECT, TOTLDLC, LDLCALC, ALT, AST, NA, K, CL, CREATININE, BUN, CO2, TSH, INR, HGBA1C, BNP    No results found for: GLUCOSE, CALCIUM, NA, K, CO2, CL, BUN, CREATININE    Objective   Vitals:    06/08/20 1112   BP: 140/82   BP Location: Left upper arm   Patient Position: Sitting   Pulse: 66   SpO2: 97%   Weight: 88.5 kg (195 lb)   Height: 1.6 m (5' 3\")     Physical Exam   Constitutional: She is oriented to person, place, and time. She appears well-developed and well-nourished. No distress.   HENT:   Head: Normocephalic and atraumatic.   Nose: Nose normal.   Eyes: Conjunctivae are normal. No scleral icterus.   Neck: No JVD present.   Cardiovascular: Normal rate, regular rhythm, normal heart sounds and intact distal pulses. Exam reveals no gallop and no friction rub.   No murmur heard.  2/6 systolic murmur   Pulmonary/Chest: Effort normal. No stridor. No respiratory distress. She has no wheezes. She has no rales. She exhibits no tenderness.   Abdominal: There is no tenderness.   Musculoskeletal: She exhibits no edema or deformity.   Uses walker   Neurological: She is alert and oriented to person, place, and time.   Skin: Skin is warm and dry.   Psychiatric: She has a normal mood and affect.       Cardiac Procedures  Cardiac Procedures     Imaging  CAT SCAN     CTA 3/17 AOR ROOT 5.0 PULM NODULES NO [id number]     CTA 3/17 aorga 3.9 lll pulm nodules stable from 201`12     CAT scan October 2018 aortic root 4  Thyroid nodule  Right upper lobe tubular nodule  No change 2016     Cat scan chest 9/19 rll infection? Small nodules aorta 4. u fo p      Cat scan head 5/19 small vessel disease      STRESS  pharm nuc neg 9/16        ECHO  Echo  Mac mild mr aortic root 4.2 aort sclerosis mild ai no 6/20 no change 2017       VASCULAR  Carotid 10/18 mild disease      CAT SCAN  Cat scan9/19 aorta 4 rll " inflammation    ELECTROPHYSIOLOGY  SVT ablation 2006      EKG normal EKG [id number] CHANGE change  ]        Assessment/Plan:  Dilated aortic root (CMS/HCC)  Imaged today June 2020 by echo stable 4.2 with aortic root index of 2.1 cm/m² mild aortic insufficiency preserved LV systolic function last CAT scan was September 2019 aortic root of 4 she has CAD based on calcification seen on CAT scan last ischemic evaluation was a nuclear test in 2016 asymptomatic no repeat study normal LV systolic function no change in her EKG right upper branch block    Coronary artery disease involving native coronary artery of native heart without angina pectoris  Diagnosed by calcification seen on CAT scan no unstable symptoms declines medication hyperlipidemic last ischemic evaluation -2016 normal LV systolic function with echo today EKG no change right bundle branch block    SVT (supraventricular tachycardia) (CMS/HCC) (HCC)  Ablation 2006 on no chronic medication no clinical recurrence    Mixed hyperlipidemia  Intolerant to medications declines pharmaceuticals    Pulmonary nodule  Last CAT scan September 2019 some right lower lobe infiltrates questionable new finding, left lower lobe nodule been stable for years due for repeat study was put off due to COVID slip given she will get this done [facility]    Gastroesophageal reflux disease without esophagitis  Chronic issue    MVP (mitral valve prolapse)  Echo today June 2020 back mild left regurgitation       CAT scan done September 2019 aortic root stable at 4 minimal inflammation right lower lobe they   She delayed repeat imaging because of COVID when she feels comfortable she will get it done   no coughing nothing unusual on exam of her lungs today echo findings are stable follow-up in 6 months I did give her slip to have lab work and repeat CAT scan that [facility] again she declines any pharmaceutical medications for her  cholesterol          Addendum sept 9 2020  Based on her cardiovascular exam from June 2020 stable to undergo cataract surgery            Thank you for allowing me to participate in the care of this patient.  I hope this information is helpful.    Matias Willard MD [facility]   6/8/2020  Cc Arpan Casillas, DO

## 2020-06-08 ENCOUNTER — HOSPITAL ENCOUNTER (OUTPATIENT)
Dept: CARDIOLOGY | Facility: CLINIC | Age: 85
Discharge: HOME | End: 2020-06-08
Attending: INTERNAL MEDICINE
Payer: MEDICARE

## 2020-06-08 ENCOUNTER — OFFICE VISIT (OUTPATIENT)
Dept: CARDIOLOGY | Facility: CLINIC | Age: 85
End: 2020-06-08
Payer: MEDICARE

## 2020-06-08 VITALS
HEART RATE: 66 BPM | SYSTOLIC BLOOD PRESSURE: 140 MMHG | HEIGHT: 63 IN | OXYGEN SATURATION: 97 % | WEIGHT: 195 LBS | DIASTOLIC BLOOD PRESSURE: 82 MMHG | BODY MASS INDEX: 34.55 KG/M2

## 2020-06-08 VITALS
BODY MASS INDEX: 36.86 KG/M2 | HEIGHT: 63 IN | SYSTOLIC BLOOD PRESSURE: 118 MMHG | WEIGHT: 208 LBS | DIASTOLIC BLOOD PRESSURE: 78 MMHG

## 2020-06-08 DIAGNOSIS — I34.1 MVP (MITRAL VALVE PROLAPSE): ICD-10-CM

## 2020-06-08 DIAGNOSIS — E78.2 MIXED HYPERLIPIDEMIA: ICD-10-CM

## 2020-06-08 DIAGNOSIS — I10 ESSENTIAL HYPERTENSION: ICD-10-CM

## 2020-06-08 DIAGNOSIS — I77.810 DILATED AORTIC ROOT (CMS/HCC): ICD-10-CM

## 2020-06-08 DIAGNOSIS — I47.10 SVT (SUPRAVENTRICULAR TACHYCARDIA) (CMS/HCC): ICD-10-CM

## 2020-06-08 DIAGNOSIS — I25.10 CORONARY ARTERY DISEASE INVOLVING NATIVE CORONARY ARTERY OF NATIVE HEART WITHOUT ANGINA PECTORIS: Primary | ICD-10-CM

## 2020-06-08 DIAGNOSIS — R91.1 PULMONARY NODULE: ICD-10-CM

## 2020-06-08 LAB
AORTIC ROOT ANNULUS - M-MODE: 3.7 CM
AORTIC ROOT ANNULUS: 4.1 CM
AORTIC VALVE MEAN VELOCITY: 1.15 M/S
AORTIC VALVE VELOCITY TIME INTEGRAL: 44.1 CM
ASCENDING AORTA: 3.6 CM
AV MEAN GRADIENT: 6 MMHG
AV PEAK GRADIENT: 12 MMHG
AV PEAK VELOCITY-S: 1.74 M/S
AV REG PEAK VEL: 3.43 M/S
AV REGURGITATION PRESSURE HALF TIME: 602 MS
AV VALVE AREA: 2.25 CM2
BSA FOR ECHO PROCEDURE: 2.05 M2
CUSP SEPARATION: 0.9 CM
DOP CALC LVOT STROKE VOLUME: 99.22 ML
E WAVE DECELERATION TIME: 246 MS
E/A RATIO: 1.2
E/E' RATIO: 14.7
E/LAT E' RATIO: 14.7
EDV (BP): 75.1 CM3
EF (A4C): 58.7 %
EF A2C: 60.1 %
EJECTION FRACTION: 60.1 %
EST RIGHT VENT SYSTOLIC PRESSURE BY TRICUSPID REGURGITATION JET: 35 MMHG
ESV (BP): 30 CM3
FRACTIONAL SHORTENING: 33 %
INTERVENTRICULAR SEPTUM: 1 CM
LA ESV (BP): 63.5 CM3
LA ESV INDEX (A2C): 30 CM3/M2
LA ESV INDEX (BP): 30.98 CM3/M2
LA/AORTA RATIO: 0.85
LAAS-AP2: 20.1 CM2
LAAS-AP4: 21.4 CM2
LAD 2D - M-MODE: 3.5 CM
LAD 2D: 3.5 CM
LALD A4C: 5.47 CM
LALD A4C: 5.96 CM
LAV-S: 61.5 CM3
LEFT ATRIUM VOLUME INDEX: 29.61 CM3/M2
LEFT ATRIUM VOLUME: 60.7 CM3
LEFT INTERNAL DIMENSION IN SYSTOLE: 3.31 CM (ref 2.7–4.08)
LEFT VENTRICLE DIASTOLIC VOLUME INDEX: 36.93 CM3/M2
LEFT VENTRICLE DIASTOLIC VOLUME: 75.7 CM3
LEFT VENTRICLE SYSTOLIC VOLUME INDEX: 15.22 CM3/M2
LEFT VENTRICLE SYSTOLIC VOLUME: 31.2 CM3
LEFT VENTRICULAR INTERNAL DIMENSION IN DIASTOLE: 4.94 CM (ref 4.57–6.35)
LEFT VENTRICULAR POSTERIOR WALL IN END DIASTOLE: 1.01 CM (ref 0.6–1.11)
LV DIASTOLIC VOLUME: 71.6 CM3
LV ESV (APICAL 2 CHAMBER): 28.6 CM3
LVAD-AP2: 24.8 CM2
LVAD-AP4: 26.2 CM2
LVAS-AP2: 14.6 CM2
LVAS-AP4: 15.2 CM2
LVEDVI(A2C): 34.93 CM3/M2
LVEDVI(BP): 36.63 CM3/M2
LVESVI(A2C): 13.95 CM3/M2
LVESVI(BP): 14.63 CM3/M2
LVLD-AP2: 7.34 CM
LVLD-AP4: 7.66 CM
LVLS-AP2: 6.44 CM
LVLS-AP4: 6.34 CM
LVOT 2D: 2 CM
LVOT A: 3.14 CM2
LVOT MG: 3 MMHG
LVOT MV: 0.82 M/S
LVOT PEAK VELOCITY: 1.12 M/S
LVOT VTI: 31.6 CM
MITRAL VALVE MEAN INFLOW VELOCITY: 3.77 M/S
MR VELOCITY: 4.84 M/S
MR VTI: 195 CM
MV E'TISSUE VEL-LAT: 0.07 M/S
MV E'TISSUE VEL-MED: 0.07 M/S
MV PEAK A VEL: 0.88 M/S
MV PEAK E VEL: 1.09 M/S
POSTERIOR WALL: 1.01 CM
PULMONARY REGURGITATION LATE DIASTOLIC VELOCITY: 1.13 M/S
RVOT VMAX: 0.64 M/S
RVOT VTI: 17.4 CM
SEPTAL TISSUE DOPPLER FREE WALL LATE DIA VELOCITY (APICAL 4 CHAMBER VIEW): 0.1 M/S
TR MAX PG: 24 MMHG
TRICUSPID VALVE PEAK REGURGITATION VELOCITY: 2.47 M/S
Z-SCORE OF LEFT VENTRICULAR DIMENSION IN END DIASTOLE: -0.87
Z-SCORE OF LEFT VENTRICULAR DIMENSION IN END SYSTOLE: -0.02
Z-SCORE OF LEFT VENTRICULAR POSTERIOR WALL IN END DIASTOLE: 1.12

## 2020-06-08 PROCEDURE — 99214 OFFICE O/P EST MOD 30 MIN: CPT | Performed by: INTERNAL MEDICINE

## 2020-06-08 PROCEDURE — 93000 ELECTROCARDIOGRAM COMPLETE: CPT | Performed by: INTERNAL MEDICINE

## 2020-06-08 PROCEDURE — 93306 TTE W/DOPPLER COMPLETE: CPT | Performed by: INTERNAL MEDICINE

## 2020-06-08 NOTE — ASSESSMENT & PLAN NOTE
Diagnosed by calcification seen on CAT scan no unstable symptoms declines medication hyperlipidemic last ischemic evaluation -2016 normal LV systolic function with echo today EKG no change right bundle branch block

## 2020-06-08 NOTE — ASSESSMENT & PLAN NOTE
Last CAT scan September 2019 some right lower lobe infiltrates questionable new finding, left lower lobe nodule been stable for years due for repeat study was put off due to COVID slip given she will get this done [facility]

## 2020-06-08 NOTE — ASSESSMENT & PLAN NOTE
Imaged today June 2020 by echo stable 4.2 with aortic root index of 2.1 cm/m² mild aortic insufficiency preserved LV systolic function last CAT scan was September 2019 aortic root of 4 she has CAD based on calcification seen on CAT scan last ischemic evaluation was a nuclear test in 2016 asymptomatic no repeat study normal LV systolic function no change in her EKG right upper branch block

## 2020-06-08 NOTE — LETTER
June 8, 2020     Arpan Casillas DO  [address]    [facility] [address]    Patient: Kathy Clayton  YOB: 1933  Date of Visit: 6/8/2020      Dear Tino    Thank you for referring Kathy Clayton to me for evaluation. Below are my notes for this consultation.    If you have questions, please do not hesitate to call me. I look forward to following your patient along with you.         Sincerely,        Matias Willard MD        CC: Jill Schneider, MD Stephen E Sacks, DO William H Lipshutz, MD Becker, Andrea J, MD  6/8/2020 11:46 AM  Sign at close encounter      Cardiology Note    Arpan Casillas DO    Kathy Clayton is a 87 y.o. female 5/24/1933         She returns for follow-up and echocardiogram  She has a diagnosis of CAD made by calcification seen on CAT scan in the past her last ischemic evaluation was a negative pharmacologic test in 2016  He is followed for.aortic root of 4.2 cm mild aortic insufficiency last CAT scan was April 2019    Echocardiogram today showed June 2020  Aortic root size 4.1 cm aortic root index 2 cm/m² preserved LV systolic function mild aortic and mitral insufficiency mitral annular calcification    She had SVT ablation in 2006  Subcentimeter pulmonary nodules mixed hyperlipidemia chronic GERD paresthesias she had abnormality on her CAT scan September 2019 suggesting this was done [facility]  right lower lobe inflammation they suggested follow-up imaging aortic root was stable at 4 cm  She is delayed getting her imaging and lab work because of COVID      She takes no prescription medication she has multiple side effects and is afraid to start anything              Electronically signed by Matias Willard MD at 10/14/2019 10:52 AM       Office Visit on 10/14/2019                        She returns for follow-up and to review her lab work  She is a history of coronary artery disease based on calcification seen on remote CAT  scan  Last ischemic evaluation, pharmacologic nuclear stress test was negative in 2016 and she had normal   echocardiogram in April 2019  She had SVT ablation 2006  She has mildly dilated aortic root of 4  Subcentimeter pulmonary nodules been stable for past year  Repeat CAT scan was done [facility] September 2019 aortic root stable for questionable infectious process right lower lobe tree-in-bud abnormality  Mixed hyperlipidemia    And GERD  Her other complaint is intermittent tingling of her hands                     Patient Active Problem List    Diagnosis Date Noted   • Paresthesias 10/14/2019   • Dizzy 05/16/2019   • Gastroesophageal reflux disease without esophagitis 05/16/2019   • Visual changes 10/08/2018   • Arthritis 04/09/2018   • Mixed hyperlipidemia 04/01/2018   • Essential hypertension 04/01/2018   • Coronary artery disease involving native coronary artery of native heart without angina pectoris 04/01/2018   • Dilated aortic root (CMS/HCC) 04/01/2018   • SVT (supraventricular tachycardia) (CMS/HCC) 04/01/2018   • Pulmonary nodule 04/01/2018   • RBBB 04/01/2018   • MVP (mitral valve prolapse) 04/01/2018       Allergy  Adenosine; Anesthesia s/i-40  [propofol]; Cephalosporins; Desvenlafaxine succinate; Dexbrompheniramine; Diltiazem; Escitalopram oxalate; Ezetimibe; Fluoxetine hcl; Lactase; Metoprolol; Mirtazapine; Penicillins; Plant stanol leonila; Sertraline hcl; Statins-hmg-coa reductase inhibitors; and Sulfa (sulfonamide antibiotics)    MED LIST     Current Outpatient Medications   Medication Sig Dispense Refill   • acetaminophen (TYLENOL EXTRA STRENGTH ORAL) Take by mouth once daily.     • cholecalciferol, vitamin D3, (cholecalciferol) 1,000 unit tablet Take 1 tablet by mouth daily.     • CYANOCOBALAMIN/FOLIC ACID (VITAMIN B12-FOLIC ACID) 500-400 mcg tablet Take 1 tablet by mouth daily.     • diclofenac sodium (VOLTAREN) 1 % topical gel Apply topically 2 (two) times a day as needed for mild  pain.     • flaxseed oil 1,000 mg capsule Take 1 capsule by mouth as needed.       • folic acid (FOLVITE) 400 mcg tablet Take 1 tablet by mouth daily.     • Lactobac no.41-Bifidobact no.7 (PROBIOTIC-10) 70 mg (3 billion cell) capsule Take 1 capsule by mouth daily.     • lansoprazole (PREVACID SOLUTAB) 30 mg disintegrating tablet Take 30 mg by mouth daily.     • LORazepam (ATIVAN) 0.5 mg tablet Take 0.5 mg by mouth nightly as needed for anxiety.     • magnesium hydroxide (MILK OF MAGNESIA CONCENTRATED ORAL) Take by mouth once.     • multivit-min-FA-lycopen-lutein (CENTRUM SILVER) tablet Take 1 tablet by mouth daily.     • polyvinyl alcohol-povidon,PF, (REFRESH CLASSIC, PF,) 1.4-0.6 % dropperette Take 1 drop by mouth 4 (four) times a day as needed.     • potassium gluconate 595 mg (99 mg) tablet Take 1 tablet by mouth daily.     • simethicone (GAS RELIEF) 125 mg capsule Take 125 mg by mouth daily.     • simethicone (MYLANTA GAS ORAL) Take by mouth.     • thiamine (vitamin B-1) 50 mg tablet Take 50 mg by mouth daily.     • vitamin E 400 unit capsule Take 1 tablet by mouth daily.       No current facility-administered medications for this visit.         Review of Systems   Constitution: Positive for malaise/fatigue. Negative for weight gain and weight loss.   HENT: Negative for hearing loss and hoarse voice.    Eyes: Negative for visual disturbance.   Cardiovascular: Negative for chest pain, claudication, cyanosis, dyspnea on exertion, irregular heartbeat, leg swelling, near-syncope, orthopnea, palpitations, paroxysmal nocturnal dyspnea and syncope.   Respiratory: Negative for cough, hemoptysis, shortness of breath, sleep disturbances due to breathing, snoring, sputum production and wheezing.    Endocrine: Negative for cold intolerance and heat intolerance.   Hematologic/Lymphatic: Negative.  Negative for bleeding problem. Does not bruise/bleed easily.   Skin: Negative.  Negative for rash.   Musculoskeletal: Positive  "for joint pain. Negative for arthritis, falls, muscle cramps and myalgias.   Gastrointestinal: Negative for abdominal pain, anorexia, change in bowel habit, constipation, diarrhea, dysphagia, heartburn, jaundice and nausea.   Genitourinary: Negative for frequency and nocturia.   Neurological: Positive for weakness. Negative for dizziness, focal weakness, headaches, light-headedness, numbness, tremors and vertigo.        Bilateral hand tingling   Psychiatric/Behavioral: Negative for memory loss. The patient does not have insomnia and is not nervous/anxious.    Allergic/Immunologic: Negative for hives.       Labs sept 2019                            No results found for: WBC, HGB, HCT, PLT, CHOL, TRIG, HDL, LDLDIRECT, TOTLDLC, LDLCALC, ALT, AST, NA, K, CL, CREATININE, BUN, CO2, TSH, INR, HGBA1C, BNP    No results found for: GLUCOSE, CALCIUM, NA, K, CO2, CL, BUN, CREATININE    Objective   Vitals:    06/08/20 1112   BP: 140/82   BP Location: Left upper arm   Patient Position: Sitting   Pulse: 66   SpO2: 97%   Weight: 88.5 kg (195 lb)   Height: 1.6 m (5' 3\")     Physical Exam   Constitutional: She is oriented to person, place, and time. She appears well-developed and well-nourished. No distress.   HENT:   Head: Normocephalic and atraumatic.   Nose: Nose normal.   Eyes: Conjunctivae are normal. No scleral icterus.   Neck: No JVD present.   Cardiovascular: Normal rate, regular rhythm, normal heart sounds and intact distal pulses. Exam reveals no gallop and no friction rub.   No murmur heard.  2/6 systolic murmur   Pulmonary/Chest: Effort normal. No stridor. No respiratory distress. She has no wheezes. She has no rales. She exhibits no tenderness.   Abdominal: There is no tenderness.   Musculoskeletal: She exhibits no edema or deformity.   Uses walker   Neurological: She is alert and oriented to person, place, and time.   Skin: Skin is warm and dry.   Psychiatric: She has a normal mood and affect.       Cardiac " Procedures  Cardiac Procedures     Imaging  CAT SCAN     CTA 3/17 AOR ROOT 5.0 PULM NODULES NO [id number]     CTA 3/17 aorga 3.9 lll pulm nodules stable from 201`12     CAT scan October 2018 aortic root 4  Thyroid nodule  Right upper lobe tubular nodule  No change 2016     Cat scan chest 9/19 rll infection? Small nodules aorta 4. u fo p      Cat scan head 5/19 small vessel disease      STRESS  pharm nuc neg 9/16        ECHO  Echo  Mac mild mr aortic root 4.2 aort sclerosis mild ai no 6/20 no change 2017       VASCULAR  Carotid 10/18 mild disease      CAT SCAN  Cat scan9/19 aorta 4 rll inflammation    ELECTROPHYSIOLOGY  SVT ablation 2006      EKG normal EKG [id number] CHANGE change  ]        Assessment/Plan:  Dilated aortic root (CMS/HCC)  Imaged today June 2020 by echo stable 4.2 with aortic root index of 2.1 cm/m² mild aortic insufficiency preserved LV systolic function last CAT scan was September 2019 aortic root of 4 she has CAD based on calcification seen on CAT scan last ischemic evaluation was a nuclear test in 2016 asymptomatic no repeat study normal LV systolic function no change in her EKG right upper branch block    Coronary artery disease involving native coronary artery of native heart without angina pectoris  Diagnosed by calcification seen on CAT scan no unstable symptoms declines medication hyperlipidemic last ischemic evaluation -2016 normal LV systolic function with echo today EKG no change right bundle branch block    SVT (supraventricular tachycardia) (CMS/HCC) (HCC)  Ablation 2006 on no chronic medication no clinical recurrence    Mixed hyperlipidemia  Intolerant to medications declines pharmaceuticals    Pulmonary nodule  Last CAT scan September 2019 some right lower lobe infiltrates questionable new finding, left lower lobe nodule been stable for years due for repeat study was put off due to COVID slip given she will get this done [facility]    Gastroesophageal reflux  disease without esophagitis  Chronic issue    MVP (mitral valve prolapse)  Echo today June 2020 back mild left regurgitation       CAT scan done September 2019 aortic root stable at 4 minimal inflammation right lower lobe they   She delayed repeat imaging because of COVID when she feels comfortable she will get it done   no coughing nothing unusual on exam of her lungs today echo findings are stable follow-up in 6 months I did give her slip to have lab work and repeat CAT scan that [facility] again she declines any pharmaceutical medications for her cholesterol          Thank you for allowing me to participate in the care of this patient.  I hope this information is helpful.    Matias Willard MD [facility]   6/8/2020  Arpan Sethi, DO

## 2020-07-08 ENCOUNTER — APPOINTMENT (RX ONLY)
Dept: URBAN - METROPOLITAN AREA CLINIC 374 | Facility: CLINIC | Age: 86
Setting detail: DERMATOLOGY
End: 2020-07-08

## 2020-07-08 DIAGNOSIS — L30.4 ERYTHEMA INTERTRIGO: ICD-10-CM

## 2020-07-08 DIAGNOSIS — L82.1 OTHER SEBORRHEIC KERATOSIS: ICD-10-CM

## 2020-07-08 PROCEDURE — ? PRESCRIPTION MEDICATION MANAGEMENT

## 2020-07-08 PROCEDURE — ? PRESCRIPTION

## 2020-07-08 PROCEDURE — 99213 OFFICE O/P EST LOW 20 MIN: CPT

## 2020-07-08 PROCEDURE — ? COUNSELING

## 2020-07-08 RX ORDER — CLOTRIMAZOLE AND BETAMETHASONE DIPROPIONATE 10; .5 MG/G; MG/G
1 CREAM TOPICAL BID
Qty: 1 | Refills: 3 | Status: ERX

## 2020-07-08 ASSESSMENT — LOCATION DETAILED DESCRIPTION DERM
LOCATION DETAILED: LEFT INFRAMAMMARY CREASE (OUTER QUADRANT)
LOCATION DETAILED: RIGHT RIB CAGE
LOCATION DETAILED: LEFT RIB CAGE
LOCATION DETAILED: RIGHT MEDIAL BREAST 5-6:00 REGION
LOCATION DETAILED: LEFT INFRAMAMMARY CREASE (INNER QUADRANT)
LOCATION DETAILED: LEFT MEDIAL BREAST 7-8:00 REGION
LOCATION DETAILED: SUBXIPHOID
LOCATION DETAILED: RIGHT INFRAMAMMARY CREASE (INNER QUADRANT)

## 2020-07-08 ASSESSMENT — LOCATION SIMPLE DESCRIPTION DERM
LOCATION SIMPLE: RIGHT BREAST
LOCATION SIMPLE: ABDOMEN
LOCATION SIMPLE: LEFT BREAST

## 2020-07-08 ASSESSMENT — SEVERITY ASSESSMENT: SEVERITY: MILD

## 2020-07-08 ASSESSMENT — LOCATION ZONE DERM: LOCATION ZONE: TRUNK

## 2020-07-08 NOTE — PROCEDURE: PRESCRIPTION MEDICATION MANAGEMENT
Detail Level: Simple
Render In Strict Bullet Format?: No
Initiate Treatment: Lotrisone cream bid to rash under breast\\nOTC Zeasorb Antifungal powder qam
Discontinue Regimen: Alaquin cream
Otc Regimen: Zeasorb antifungal powder

## 2020-07-20 ENCOUNTER — TELEPHONE (OUTPATIENT)
Dept: CARDIOLOGY | Facility: CLINIC | Age: 85
End: 2020-07-20

## 2020-07-20 NOTE — LETTER
[facility] IN [place] MEETING                   FAX COVER SHEET    DATE:  2020       TO:    [facility] [place]- Lab        FROM: Radha GARCIA [phone]      SUBJECT: Records Request          MESSAGE:     Kathy Clayton (: 1933) is scheduled for an office visit, Please fax recent records to [phone]    Pt had labs done- order by Dr. Willard, on 2020. Please fax results to [phone]. Thanks, KL                           X Labs         ?Office Notes    ?Cardiac Testing    ?EKGs                 ?Hospital Consults      ?Discharge Summary       ?All testing              If you have received this fax in error, please notify me at [phone] or by fax, [phone]

## 2020-07-20 NOTE — TELEPHONE ENCOUNTER
Pt called and states she will need cardiac clearance for cataract surgery 8/18/2020 @ [facility] with Dr. Wu.    Pt will drop off the form off to [facility] .

## 2020-07-21 NOTE — TELEPHONE ENCOUNTER
Spoke to Dr. Dye's office, pt was last seen June 2020. Last OV note, EKG, H&P form faxed to Dr. Dye's office as requested.

## 2020-08-12 NOTE — TELEPHONE ENCOUNTER
Pt called and states she is scheduled to have her eyes done and she had labs done @ [facility] [place]- labs in care everywhere.    She would like for you to review.     Per pt's request- I called [facility] [place] for report to be faxed to the office  -- NO ANSWER.    I faxed for report to be sent.

## 2020-09-08 NOTE — TELEPHONE ENCOUNTER
Dr. De La Paz,    Pt called and states that she needs to have another clearance letter sent to [facility] for her second cataract surgery. Its not scheduled yet due to the issue she had with the eye drops after the first surgery. She is sensitive to most meds and was only able to use the drops(after surgery) for 2 of the 4 wks required. They are waiting to schedule the second surgery until after they receive the clearance letter because they have to schedule her at a different facility([facility]) due to her sensitvity.    Will you be able to write a clearance letter?    *waiting for clearance form to be faxed from [facility].    Let me know.    Thanks,    KL

## 2020-09-08 NOTE — TELEPHONE ENCOUNTER
I called and spoke to pt. She wants to call the office to see when they were planning on scheduling her to surgery. If the end of next week she may wait for Dr. Willard on Monday. If early next week she will need to figure something out for Thursday with her daughter.   Message sent for more information.

## 2020-09-09 NOTE — TELEPHONE ENCOUNTER
I added an addendum to my letter from June, for her cataract surgery but if she needs an EKG she will need to have an office visit

## 2020-12-10 ASSESSMENT — ENCOUNTER SYMPTOMS
LIGHT-HEADEDNESS: 0
DIZZINESS: 0
ABDOMINAL PAIN: 0
NERVOUS/ANXIOUS: 0
FALLS: 0
FREQUENCY: 0
CLAUDICATION: 0
SNORING: 0
DYSPNEA ON EXERTION: 0
HEMATOLOGIC/LYMPHATIC NEGATIVE: 1
NAUSEA: 0
HEMOPTYSIS: 0
WEAKNESS: 1
INSOMNIA: 0
HEARTBURN: 0
CONSTIPATION: 0
ORTHOPNEA: 0
JAUNDICE: 0
NUMBNESS: 0
PALPITATIONS: 0
WEIGHT LOSS: 0
WEIGHT GAIN: 0
HEADACHES: 0
VERTIGO: 0
IRREGULAR HEARTBEAT: 0
MUSCLE CRAMPS: 0
MEMORY LOSS: 0
COUGH: 0
TREMORS: 0
NEAR-SYNCOPE: 0
MYALGIAS: 0
WHEEZING: 0
BRUISES/BLEEDS EASILY: 0
SLEEP DISTURBANCES DUE TO BREATHING: 0
HOARSE VOICE: 0
PND: 0
CHANGE IN BOWEL HABIT: 0
FOCAL WEAKNESS: 0
SHORTNESS OF BREATH: 0
DIARRHEA: 0
SYNCOPE: 0
SPUTUM PRODUCTION: 0
ANOREXIA: 0

## 2020-12-10 NOTE — PROGRESS NOTES
Cardiology Note    Arpan Casillas DO Lillian E Forrest is a 87 y.o. female 5/24/1933         She returns for follow-up \  She has a diagnosis of CAD made by calcification seen on CAT scan in the past   her last ischemic evaluation was a negative pharmacologic test in 2016  He is followed for.dilated aortic root of 4.2 cm mild aortic insufficiency last CAT scan was April 2019    She had CAT scan of aortic root at [place] December 2020 stable 4.1 cm subcentimeter pulmonary nodules actually smaller felt to be bronchiolitis           '' echocardiogramJune 2020  Aortic root size 4.1 cm aortic root index 2 cm/m² preserved LV systolic function mild aortic and mitral insufficiency mitral annular calcification    She had SVT ablation in 2006  Subcentimeter pulmonary nodules mixed hyperlipidemia chronic GERD paresthesias she had abnormality on her CAT scan September 2019 suggesting this was done [facility]  right lower lobe inflammation they suggested follow-up imaging aortic root was stable at 4 cm  She is delayed getting her imaging and lab work because of COVID      She takes no prescription medication she has multiple side effects and is afraid to start anything          Lab August 2020 not at goal discussed LDL cholesterol 162 total cholesterol 250  She declines pharmaceutical intervention    December 2020  Creatinine 0.85 potassium 4.2  CBC normal    She continues to get atypical chest pain that sounds like reflux and she has had for years  Her last ischemic evaluation was a nuclear stress test she had a reaction to the adenosine it was negative in 2016                                   Patient Active Problem List    Diagnosis Date Noted   • Paresthesias 10/14/2019   • Dizzy 05/16/2019   • Gastroesophageal reflux disease without esophagitis 05/16/2019   • Visual changes 10/08/2018   • Arthritis 04/09/2018   • Mixed hyperlipidemia 04/01/2018   • Essential hypertension 04/01/2018   • Coronary  artery disease involving native coronary artery of native heart without angina pectoris 04/01/2018   • Dilated aortic root (CMS/HCC) 04/01/2018   • SVT (supraventricular tachycardia) (CMS/[facility]) 04/01/2018   • Pulmonary nodule 04/01/2018   • RBBB 04/01/2018   • MVP (mitral valve prolapse) 04/01/2018       Allergy  Adenosine, Anesthesia s/i-40  [propofol], Cephalosporins, Desvenlafaxine succinate, Dexbrompheniramine, Diltiazem, Escitalopram oxalate, Ezetimibe, Fluoxetine hcl, Lactase, Metoprolol, Mirtazapine, Penicillins, Plant stanol leonila, Sertraline hcl, Statins-hmg-coa reductase inhibitors, and Sulfa (sulfonamide antibiotics)    MED LIST     Current Outpatient Medications   Medication Sig Dispense Refill   • acetaminophen (TYLENOL EXTRA STRENGTH ORAL) Take by mouth as needed.       • cholecalciferol, vitamin D3, (cholecalciferol) 1,000 unit tablet Take 1 tablet by mouth daily.     • CYANOCOBALAMIN/FOLIC ACID (VITAMIN B12-FOLIC ACID) 500-400 mcg tablet Take 1 tablet by mouth daily.     • flaxseed oil 1,000 mg capsule Take 1 capsule by mouth as needed.       • folic acid (FOLVITE) 400 mcg tablet Take 1 tablet by mouth daily.     • Lactobac no.41-Bifidobact no.7 (PROBIOTIC-10) 70 mg (3 billion cell) capsule Take 1 capsule by mouth daily.     • lansoprazole (PREVACID SOLUTAB) 30 mg disintegrating tablet Take 30 mg by mouth daily.     • LORazepam (ATIVAN) 0.5 mg tablet Take 0.5 mg by mouth nightly as needed for anxiety.     • magnesium hydroxide (MILK OF MAGNESIA CONCENTRATED ORAL) Take by mouth once.     • multivit-min-FA-lycopen-lutein (CENTRUM SILVER) tablet Take 1 tablet by mouth daily.     • polyvinyl alcohol-povidon,PF, (REFRESH CLASSIC, PF,) 1.4-0.6 % dropperette Take 1 drop by mouth 4 (four) times a day as needed.     • potassium gluconate 595 mg (99 mg) tablet Take 1 tablet by mouth daily.     • simethicone (GAS RELIEF) 125 mg capsule Take 125 mg by mouth daily.     • simethicone (MYLANTA GAS ORAL) Take by  mouth.     • thiamine (vitamin B-1) 50 mg tablet Take 50 mg by mouth daily.     • vitamin E 400 unit capsule Take 1 tablet by mouth daily.       No current facility-administered medications for this visit.         Review of Systems   Constitution: Positive for malaise/fatigue. Negative for weight gain and weight loss.   HENT: Negative for hearing loss and hoarse voice.    Eyes: Negative for visual disturbance.   Cardiovascular: Negative for chest pain, claudication, cyanosis, dyspnea on exertion, irregular heartbeat, leg swelling, near-syncope, orthopnea, palpitations, paroxysmal nocturnal dyspnea and syncope.   Respiratory: Negative for cough, hemoptysis, shortness of breath, sleep disturbances due to breathing, snoring, sputum production and wheezing.    Endocrine: Negative for cold intolerance and heat intolerance.   Hematologic/Lymphatic: Negative.  Negative for bleeding problem. Does not bruise/bleed easily.   Skin: Negative.  Negative for rash.   Musculoskeletal: Positive for joint pain. Negative for arthritis, falls, muscle cramps and myalgias.   Gastrointestinal: Negative for abdominal pain, anorexia, change in bowel habit, constipation, diarrhea, dysphagia, heartburn, jaundice and nausea.   Genitourinary: Negative for frequency and nocturia.   Neurological: Positive for weakness. Negative for dizziness, focal weakness, headaches, light-headedness, numbness, tremors and vertigo.        Bilateral hand tingling   Psychiatric/Behavioral: Negative for memory loss. The patient does not have insomnia and is not nervous/anxious.    Allergic/Immunologic: Negative for hives.                                   No results found for: WBC, HGB, HCT, PLT, CHOL, TRIG, HDL, LDLDIRECT, TOTLDLC, LDLCALC, ALT, AST, NA, K, CL, CREATININE, BUN, CO2, TSH, INR, HGBA1C, BNP    No results found for: GLUCOSE, CALCIUM, NA, K, CO2, CL, BUN, CREATININE    Objective   Vitals:    12/14/20 0838   BP: 138/82   BP Location: Left upper arm  "  Patient Position: Sitting   Pulse: 79   SpO2: 94%   Weight: 87.5 kg (193 lb)   Height: 1.6 m (5' 3\")     Physical Exam   Constitutional: She is oriented to person, place, and time. She appears well-developed and well-nourished. No distress.   HENT:   Head: Normocephalic and atraumatic.   Nose: Nose normal.   Eyes: Conjunctivae are normal. No scleral icterus.   Neck: No JVD present.   Cardiovascular: Normal rate, regular rhythm, normal heart sounds and intact distal pulses. Exam reveals no gallop and no friction rub.   No murmur heard.  2/6 systolic murmur   Pulmonary/Chest: Effort normal. No stridor. No respiratory distress. She has no wheezes. She has no rales. She exhibits no tenderness.   Abdominal: There is no abdominal tenderness.   Musculoskeletal:         General: No deformity or edema.      Comments: Uses walker   Neurological: She is alert and oriented to person, place, and time.   Skin: Skin is warm and dry.   Psychiatric: She has a normal mood and affect.       Cardiac Procedures  Cardiac Procedures     Imaging  CAT SCAN     CTA 3/17 AOR ROOT 5.0 PULM NODULES NO [id number]     CTA 3/17 aorga 3.9 lll pulm nodules stable from 201`12     CAT scan October 2018 aortic root 4  Thyroid nodule  Right upper lobe tubular nodule  No change 2016     Cat scan chest 9/19 rll infection? Small nodules aorta 4. u fo p      Cat scan head 5/19 small vessel disease    Cat scan dec 2020 4.2 pulm nodules stable felt to be bronchiolitis      STRESS  pharm nuc neg 9/16        ECHO  Echo  Mac mild mr aortic root 4.2 aort sclerosis mild ai no 6/20 no change 2017       VASCULAR  Carotid 10/18 mild disease      CAT SCAN  Cat scan9/19 aorta 4 rll inflammation  CAT scan December 2020 1 aortic root 4.1 cm pulmonary nodule stable bronchiolitis coronary calcifications thyroid cysts    Cor cta 6/2021  Mild CAD [facility] aortic root 4.1 cm mac        ELECTROPHYSIOLOGY  SVT ablation 2006      EKG normal EKG " [id number] normal [id number] change  ]        Assessment/Plan:  Dilated aortic root (CMS/HCC)  Recent imaging noncontrast CAT scan [facility] stable 4.1 cm December 2020 last echo June 2020 normal aortic valve sclerotic mild aortic sufficiency normal LV systolic function    Coronary artery disease involving native coronary artery of native heart without angina pectoris  Calcification seen on CAT scan negative pharmacologic nuclear stress test 2016 she did not like adenosine plan coronary CT angiogram prior to next visit    Mixed hyperlipidemia  LDL cholesterol is about 160 she declines pharmaceutical therapy due to her multiple drug intolerances    SVT (supraventricular tachycardia) (CMS/HCC) ([facility])  Ablation 2006    Pulmonary nodule  Subcentimeter pulmonary nodules last imaged December 2020 appears smaller described as bronchiolitis imaged he received      MVP (mitral valve prolapse)  Last imaged June 2020 mild mitral regurgitation for echo next visit       CAT scan done September 2019 aortic root stable at 4 minimal inflammation right lower lobe they   She delayed repeat imaging because of COVID when she feels comfortable she will get it done   no coughing nothing unusual on exam of her lungs today echo findings are stable follow-up in 6 months I did give her slip to have lab work and repeat CAT scan that [facility] again she declines any pharmaceutical medications for her cholesterol                      She is followed for dilated aortic root, CAD MVP  Stable imaging noncontrast CAT scan at [facility] December 2020  Hyperlipidemia declines pharmaceutical interventions because of her multiple allergies    Follow-up in 6 months with resting echo coronary CT angiogram prior to that visit to assess coronary artery status  Echocardiogram at visit to reassess mitral valve prolapse mild mitral regurgitation              Thank you for allowing me to  participate in the care of this patient.  I hope this information is helpful.    Matias Willard MD [facility]   12/14/2020  Cc Arpan Casillas, DO

## 2020-12-14 ENCOUNTER — OFFICE VISIT (OUTPATIENT)
Dept: CARDIOLOGY | Facility: CLINIC | Age: 85
End: 2020-12-14
Payer: MEDICARE

## 2020-12-14 VITALS
HEIGHT: 63 IN | BODY MASS INDEX: 34.2 KG/M2 | DIASTOLIC BLOOD PRESSURE: 82 MMHG | HEART RATE: 79 BPM | SYSTOLIC BLOOD PRESSURE: 138 MMHG | WEIGHT: 193 LBS | OXYGEN SATURATION: 94 %

## 2020-12-14 DIAGNOSIS — R94.31 ABNORMAL EKG: Primary | ICD-10-CM

## 2020-12-14 DIAGNOSIS — I25.10 CORONARY ARTERY DISEASE INVOLVING NATIVE CORONARY ARTERY OF NATIVE HEART WITHOUT ANGINA PECTORIS: ICD-10-CM

## 2020-12-14 DIAGNOSIS — I77.810 DILATED AORTIC ROOT (CMS/HCC): ICD-10-CM

## 2020-12-14 PROCEDURE — 99214 OFFICE O/P EST MOD 30 MIN: CPT | Performed by: INTERNAL MEDICINE

## 2020-12-14 PROCEDURE — 93000 ELECTROCARDIOGRAM COMPLETE: CPT | Performed by: INTERNAL MEDICINE

## 2020-12-14 NOTE — ASSESSMENT & PLAN NOTE
Subcentimeter pulmonary nodules last imaged December 2020 appears smaller described as bronchiolitis imaged he received

## 2020-12-14 NOTE — ASSESSMENT & PLAN NOTE
LDL cholesterol is about 160 she declines pharmaceutical therapy due to her multiple drug intolerances

## 2020-12-14 NOTE — LETTER
December 14, 2020     Arpan Casillas DO  [address]    [facility] [address]    Patient: Kathy Clayton  YOB: 1933  Date of Visit: 12/14/2020      Dear Tino    Thank you for referring Kathy Clayton to me for evaluation. Below are my notes for this consultation.    If you have questions, please do not hesitate to call me. I look forward to following your patient along with you.         Sincerely,        Matias Willard MD        CC: Jill Schneider, MD Stephen E Sacks, DO William H Lipshutz, MD Kristin M Didomenico, MD Becker, Matias LIND MD  12/14/2020 11:50 AM  Sign when Signing Visit      Cardiology Note    Arpan Casillas DO    Kathy Clayton is a 87 y.o. female 5/24/1933         She returns for follow-up \  She has a diagnosis of CAD made by calcification seen on CAT scan in the past   her last ischemic evaluation was a negative pharmacologic test in 2016  He is followed for.dilated aortic root of 4.2 cm mild aortic insufficiency last CAT scan was April 2019    She had CAT scan of aortic root at [place] December 2020 stable 4.1 cm subcentimeter pulmonary nodules actually smaller felt to be bronchiolitis           '' echocardiogramJune 2020  Aortic root size 4.1 cm aortic root index 2 cm/m² preserved LV systolic function mild aortic and mitral insufficiency mitral annular calcification    She had SVT ablation in 2006  Subcentimeter pulmonary nodules mixed hyperlipidemia chronic GERD paresthesias she had abnormality on her CAT scan September 2019 suggesting this was done [facility]  right lower lobe inflammation they suggested follow-up imaging aortic root was stable at 4 cm  She is delayed getting her imaging and lab work because of COVID      She takes no prescription medication she has multiple side effects and is afraid to start anything          Lab August 2020 not at goal discussed LDL cholesterol 162 total cholesterol 250  She declines  pharmaceutical intervention    December 2020  Creatinine 0.85 potassium 4.2  CBC normal    She continues to get atypical chest pain that sounds like reflux and she has had for years  Her last ischemic evaluation was a nuclear stress test she had a reaction to the adenosine it was negative in 2016                                   Patient Active Problem List    Diagnosis Date Noted   • Paresthesias 10/14/2019   • Dizzy 05/16/2019   • Gastroesophageal reflux disease without esophagitis 05/16/2019   • Visual changes 10/08/2018   • Arthritis 04/09/2018   • Mixed hyperlipidemia 04/01/2018   • Essential hypertension 04/01/2018   • Coronary artery disease involving native coronary artery of native heart without angina pectoris 04/01/2018   • Dilated aortic root (CMS/HCC) 04/01/2018   • SVT (supraventricular tachycardia) (CMS/[facility]) 04/01/2018   • Pulmonary nodule 04/01/2018   • RBBB 04/01/2018   • MVP (mitral valve prolapse) 04/01/2018       Allergy  Adenosine, Anesthesia s/i-40  [propofol], Cephalosporins, Desvenlafaxine succinate, Dexbrompheniramine, Diltiazem, Escitalopram oxalate, Ezetimibe, Fluoxetine hcl, Lactase, Metoprolol, Mirtazapine, Penicillins, Plant stanol leonila, Sertraline hcl, Statins-hmg-coa reductase inhibitors, and Sulfa (sulfonamide antibiotics)    MED LIST     Current Outpatient Medications   Medication Sig Dispense Refill   • acetaminophen (TYLENOL EXTRA STRENGTH ORAL) Take by mouth as needed.       • cholecalciferol, vitamin D3, (cholecalciferol) 1,000 unit tablet Take 1 tablet by mouth daily.     • CYANOCOBALAMIN/FOLIC ACID (VITAMIN B12-FOLIC ACID) 500-400 mcg tablet Take 1 tablet by mouth daily.     • flaxseed oil 1,000 mg capsule Take 1 capsule by mouth as needed.       • folic acid (FOLVITE) 400 mcg tablet Take 1 tablet by mouth daily.     • Lactobac no.41-Bifidobact no.7 (PROBIOTIC-10) 70 mg (3 billion cell) capsule Take 1 capsule by mouth daily.     • lansoprazole (PREVACID SOLUTAB) 30 mg  disintegrating tablet Take 30 mg by mouth daily.     • LORazepam (ATIVAN) 0.5 mg tablet Take 0.5 mg by mouth nightly as needed for anxiety.     • magnesium hydroxide (MILK OF MAGNESIA CONCENTRATED ORAL) Take by mouth once.     • multivit-min-FA-lycopen-lutein (CENTRUM SILVER) tablet Take 1 tablet by mouth daily.     • polyvinyl alcohol-povidon,PF, (REFRESH CLASSIC, PF,) 1.4-0.6 % dropperette Take 1 drop by mouth 4 (four) times a day as needed.     • potassium gluconate 595 mg (99 mg) tablet Take 1 tablet by mouth daily.     • simethicone (GAS RELIEF) 125 mg capsule Take 125 mg by mouth daily.     • simethicone (MYLANTA GAS ORAL) Take by mouth.     • thiamine (vitamin B-1) 50 mg tablet Take 50 mg by mouth daily.     • vitamin E 400 unit capsule Take 1 tablet by mouth daily.       No current facility-administered medications for this visit.         Review of Systems   Constitution: Positive for malaise/fatigue. Negative for weight gain and weight loss.   HENT: Negative for hearing loss and hoarse voice.    Eyes: Negative for visual disturbance.   Cardiovascular: Negative for chest pain, claudication, cyanosis, dyspnea on exertion, irregular heartbeat, leg swelling, near-syncope, orthopnea, palpitations, paroxysmal nocturnal dyspnea and syncope.   Respiratory: Negative for cough, hemoptysis, shortness of breath, sleep disturbances due to breathing, snoring, sputum production and wheezing.    Endocrine: Negative for cold intolerance and heat intolerance.   Hematologic/Lymphatic: Negative.  Negative for bleeding problem. Does not bruise/bleed easily.   Skin: Negative.  Negative for rash.   Musculoskeletal: Positive for joint pain. Negative for arthritis, falls, muscle cramps and myalgias.   Gastrointestinal: Negative for abdominal pain, anorexia, change in bowel habit, constipation, diarrhea, dysphagia, heartburn, jaundice and nausea.   Genitourinary: Negative for frequency and nocturia.   Neurological: Positive for  "weakness. Negative for dizziness, focal weakness, headaches, light-headedness, numbness, tremors and vertigo.        Bilateral hand tingling   Psychiatric/Behavioral: Negative for memory loss. The patient does not have insomnia and is not nervous/anxious.    Allergic/Immunologic: Negative for hives.                                   No results found for: WBC, HGB, HCT, PLT, CHOL, TRIG, HDL, LDLDIRECT, TOTLDLC, LDLCALC, ALT, AST, NA, K, CL, CREATININE, BUN, CO2, TSH, INR, HGBA1C, BNP    No results found for: GLUCOSE, CALCIUM, NA, K, CO2, CL, BUN, CREATININE    Objective   Vitals:    12/14/20 0838   BP: 138/82   BP Location: Left upper arm   Patient Position: Sitting   Pulse: 79   SpO2: 94%   Weight: 87.5 kg (193 lb)   Height: 1.6 m (5' 3\")     Physical Exam   Constitutional: She is oriented to person, place, and time. She appears well-developed and well-nourished. No distress.   HENT:   Head: Normocephalic and atraumatic.   Nose: Nose normal.   Eyes: Conjunctivae are normal. No scleral icterus.   Neck: No JVD present.   Cardiovascular: Normal rate, regular rhythm, normal heart sounds and intact distal pulses. Exam reveals no gallop and no friction rub.   No murmur heard.  2/6 systolic murmur   Pulmonary/Chest: Effort normal. No stridor. No respiratory distress. She has no wheezes. She has no rales. She exhibits no tenderness.   Abdominal: There is no abdominal tenderness.   Musculoskeletal:         General: No deformity or edema.      Comments: Uses walker   Neurological: She is alert and oriented to person, place, and time.   Skin: Skin is warm and dry.   Psychiatric: She has a normal mood and affect.       Cardiac Procedures  Cardiac Procedures     Imaging  CAT SCAN     CTA 3/17 AOR ROOT 5.0 PULM NODULES NO [id number]     CTA 3/17 aorga 3.9 lll pulm nodules stable from 201`12     CAT scan October 2018 aortic root 4  Thyroid nodule  Right upper lobe tubular nodule  No change 2016     Cat scan chest 9/19 rll " infection? Small nodules aorta 4. u fo p      Cat scan head 5/19 small vessel disease    Cat scan dec 2020 4.2 pulm nodules stable felt to be bronchiolitis      STRESS  pharm nuc neg 9/16        ECHO  Echo  Mac mild mr aortic root 4.2 aort sclerosis mild ai no 6/20 no change 2017       VASCULAR  Carotid 10/18 mild disease      CAT SCAN  Cat scan9/19 aorta 4 rll inflammation  CAT scan December 2020 1 aortic root 4.1 cm pulmonary nodule stable bronchiolitis coronary calcifications thyroid cysts  ELECTROPHYSIOLOGY  SVT ablation 2006      EKG normal EKG [id number] normal irbbbGE change  ]        Assessment/Plan:  Dilated aortic root (CMS/HCC)  Recent imaging noncontrast CAT scan [facility] stable 4.1 cm December 2020 last echo June 2020 normal aortic valve sclerotic mild aortic sufficiency normal LV systolic function    Coronary artery disease involving native coronary artery of native heart without angina pectoris  Calcification seen on CAT scan negative pharmacologic nuclear stress test 2016 she did not like adenosine plan coronary CT angiogram prior to next visit    Mixed hyperlipidemia  LDL cholesterol is about 160 she declines pharmaceutical therapy due to her multiple drug intolerances    SVT (supraventricular tachycardia) (CMS/HCC) (HCC)  Ablation 2006    Pulmonary nodule  Subcentimeter pulmonary nodules last imaged December 2020 appears smaller described as bronchiolitis imaged he received      MVP (mitral valve prolapse)  Last imaged June 2020 mild mitral regurgitation for echo next visit       CAT scan done September 2019 aortic root stable at 4 minimal inflammation right lower lobe they   She delayed repeat imaging because of COVID when she feels comfortable she will get it done   no coughing nothing unusual on exam of her lungs today echo findings are stable follow-up in 6 months I did give her slip to have lab work and repeat CAT scan that [facility] again  she declines any pharmaceutical medications for her cholesterol                      She is followed for dilated aortic root, CAD MVP  Stable imaging noncontrast CAT scan at [facility] December 2020  Hyperlipidemia declines pharmaceutical interventions because of her multiple allergies    Follow-up in 6 months with resting echo coronary CT angiogram prior to that visit to assess coronary artery status  Echocardiogram at visit to reassess mitral valve prolapse mild mitral regurgitation              Thank you for allowing me to participate in the care of this patient.  I hope this information is helpful.    Matias Willard MD [facility]   12/14/2020  Arpan Sethi, DO

## 2020-12-14 NOTE — ASSESSMENT & PLAN NOTE
Recent imaging noncontrast CAT scan [facility] stable 4.1 cm December 2020 last echo June 2020 normal aortic valve sclerotic mild aortic sufficiency normal LV systolic function

## 2020-12-14 NOTE — ASSESSMENT & PLAN NOTE
Calcification seen on CAT scan negative pharmacologic nuclear stress test 2016 she did not like adenosine plan coronary CT angiogram prior to next visit

## 2021-02-11 ENCOUNTER — TELEPHONE (OUTPATIENT)
Dept: CARDIOLOGY | Facility: CLINIC | Age: 86
End: 2021-02-11

## 2021-02-11 RX ORDER — POTASSIUM CHLORIDE 750 MG/1
10 TABLET, EXTENDED RELEASE ORAL 2 TIMES DAILY
Qty: 180 TABLET | Refills: 1 | Status: SHIPPED | OUTPATIENT
Start: 2021-02-11 | End: 2021-06-14

## 2021-02-11 RX ORDER — FUROSEMIDE 20 MG/1
20 TABLET ORAL DAILY
Qty: 90 TABLET | Refills: 1 | Status: SHIPPED | OUTPATIENT
Start: 2021-02-11 | End: 2021-06-14

## 2021-02-11 NOTE — TELEPHONE ENCOUNTER
Pt of Dr. Willard- Pt calling as she has noticed her feet/ankles have been swelling for last week. No SOB.     Hx of HTN, CAD, SVT, RBBB, MVP.     Does not take diuretic.       States uncomfortable to put shoes on. Does not elevate feet regularly but will start. Will decrease sodium in diet, but does not feel she uses excessively. Does not check BP or daily weights. Advised pt to start tracking daily weights. Explained rationale.     Pt does state she has had decreased urination, states she feels urge to urinate, but decreased volume. Denies UTI symptoms.     Pt asking for c/b to discuss. She can be reached 5at [phone] or [phone]

## 2021-02-11 NOTE — TELEPHONE ENCOUNTER
I called her I asked her to take Lasix 20 mg with potassium once a day for a week let us know how that does prescription was sent into the pharmacy

## 2021-03-24 ENCOUNTER — APPOINTMENT (RX ONLY)
Dept: URBAN - METROPOLITAN AREA CLINIC 374 | Facility: CLINIC | Age: 87
Setting detail: DERMATOLOGY
End: 2021-03-24

## 2021-03-24 DIAGNOSIS — D22 MELANOCYTIC NEVI: ICD-10-CM

## 2021-03-24 DIAGNOSIS — D18.0 HEMANGIOMA: ICD-10-CM

## 2021-03-24 DIAGNOSIS — L57.8 OTHER SKIN CHANGES DUE TO CHRONIC EXPOSURE TO NONIONIZING RADIATION: ICD-10-CM

## 2021-03-24 DIAGNOSIS — L81.4 OTHER MELANIN HYPERPIGMENTATION: ICD-10-CM

## 2021-03-24 DIAGNOSIS — L30.4 ERYTHEMA INTERTRIGO: ICD-10-CM | Status: RESOLVED

## 2021-03-24 DIAGNOSIS — L82.1 OTHER SEBORRHEIC KERATOSIS: ICD-10-CM

## 2021-03-24 PROBLEM — D22.72 MELANOCYTIC NEVI OF LEFT LOWER LIMB, INCLUDING HIP: Status: ACTIVE | Noted: 2021-03-24

## 2021-03-24 PROBLEM — D22.9 MELANOCYTIC NEVI, UNSPECIFIED: Status: ACTIVE | Noted: 2021-03-24

## 2021-03-24 PROBLEM — D18.01 HEMANGIOMA OF SKIN AND SUBCUTANEOUS TISSUE: Status: ACTIVE | Noted: 2021-03-24

## 2021-03-24 PROCEDURE — ? PRESCRIPTION MEDICATION MANAGEMENT

## 2021-03-24 PROCEDURE — 99213 OFFICE O/P EST LOW 20 MIN: CPT

## 2021-03-24 PROCEDURE — ? COUNSELING

## 2021-03-24 PROCEDURE — ? PHOTO-DOCUMENTATION

## 2021-03-24 PROCEDURE — ? FULL BODY SKIN EXAM

## 2021-03-24 ASSESSMENT — LOCATION DETAILED DESCRIPTION DERM
LOCATION DETAILED: RIGHT LATERAL SUPERIOR CHEST
LOCATION DETAILED: LEFT DISTAL DORSAL FOREARM
LOCATION DETAILED: LEFT SUPERIOR UPPER BACK
LOCATION DETAILED: LEFT INFRAMAMMARY CREASE (INNER QUADRANT)
LOCATION DETAILED: LEFT LATERAL SUPERIOR CHEST
LOCATION DETAILED: RIGHT INFRAMAMMARY CREASE (INNER QUADRANT)
LOCATION DETAILED: LEFT HEEL

## 2021-03-24 ASSESSMENT — LOCATION SIMPLE DESCRIPTION DERM
LOCATION SIMPLE: LEFT BREAST
LOCATION SIMPLE: RIGHT BREAST
LOCATION SIMPLE: LEFT UPPER BACK
LOCATION SIMPLE: CHEST
LOCATION SIMPLE: LEFT FOREARM
LOCATION SIMPLE: LEFT HEEL

## 2021-03-24 ASSESSMENT — LOCATION ZONE DERM
LOCATION ZONE: ARM
LOCATION ZONE: TRUNK
LOCATION ZONE: FEET

## 2021-03-24 NOTE — PROCEDURE: PRESCRIPTION MEDICATION MANAGEMENT
Detail Level: Simple
Continue Regimen: Lotrisone cream bid to rash under breast/ prn flares\\nOTC Zeasorb Antifungal powder qam/ prn flares
Render In Strict Bullet Format?: No
Discontinue Regimen: Alaquin cream
Otc Regimen: Zeasorb antifungal powder

## 2021-04-15 ENCOUNTER — TELEPHONE (OUTPATIENT)
Dept: CARDIOLOGY | Facility: CLINIC | Age: 86
End: 2021-04-15

## 2021-04-15 NOTE — TELEPHONE ENCOUNTER
Received a fax from Dr. Joe's office requesting cardiac/anticoagulant clearance for colonoscopy and EGD on 7/19/2021.

## 2021-06-03 ENCOUNTER — TELEPHONE (OUTPATIENT)
Dept: SCHEDULING | Facility: CLINIC | Age: 86
End: 2021-06-03

## 2021-06-03 NOTE — TELEPHONE ENCOUNTER
Medical Records Request     Name of caller:  Jovana @ [facility] [place]    Records being requested: Please Fax [facility] Script.     Fax number: [phone].     Thank you.

## 2021-06-09 PROBLEM — H53.9 VISUAL CHANGES: Status: RESOLVED | Noted: 2018-10-08 | Resolved: 2021-06-09

## 2021-06-09 PROBLEM — R42 DIZZY: Status: RESOLVED | Noted: 2019-05-16 | Resolved: 2021-06-09

## 2021-06-09 ASSESSMENT — ENCOUNTER SYMPTOMS
ANOREXIA: 0
CONSTIPATION: 0
INSOMNIA: 0
MYALGIAS: 0
HEARTBURN: 0
ORTHOPNEA: 0
CLAUDICATION: 0
SNORING: 0
ABDOMINAL PAIN: 0
LIGHT-HEADEDNESS: 0
NEAR-SYNCOPE: 0
BRUISES/BLEEDS EASILY: 0
HEMATOLOGIC/LYMPHATIC NEGATIVE: 1
SYNCOPE: 0
HEADACHES: 0
HOARSE VOICE: 0
WHEEZING: 0
COUGH: 0
FREQUENCY: 0
MEMORY LOSS: 0
IRREGULAR HEARTBEAT: 0
CHANGE IN BOWEL HABIT: 0
DIARRHEA: 0
PALPITATIONS: 0
NUMBNESS: 0
WEIGHT LOSS: 0
DIZZINESS: 0
WEIGHT GAIN: 0
JAUNDICE: 0
MUSCLE CRAMPS: 0
PND: 0
VERTIGO: 0
NERVOUS/ANXIOUS: 0
TREMORS: 0
SHORTNESS OF BREATH: 0
FALLS: 0
FOCAL WEAKNESS: 0
SLEEP DISTURBANCES DUE TO BREATHING: 0
DYSPNEA ON EXERTION: 0
HEMOPTYSIS: 0
WEAKNESS: 1
NAUSEA: 0
SPUTUM PRODUCTION: 0

## 2021-06-09 NOTE — PROGRESS NOTES
Cardiology Note    Arpan Casillas DO Lillian E Forrest is a 88 y.o. female 5/24/1933       She returns for follow-up and echocardiogram  She has mildly dilated aortic root  Echocardiogram today June 2021 stable aortic root 4.2n change from 2018  LVH preserved ejection fraction mild aortic insufficiency mild to moderate MR    Coronary artery disease dt  She had coronary CT angiogram in June 2021 at [facility] mild nonobstructive CAD  Aortic root stable 4.1 cm with cta  She is followed for mixed hyperlipidemia  Remote ablation for SVT in 2006  MVP with mild mitral regurgitation  Mixed hyperlipidemia  She has multiple drug intolerances and has declined pharmaceutical intervention  Her main medical issue has been unintended weight loss              Lab work creatinine 0.83 potassium 4  CBC normal                                               Patient Active Problem List    Diagnosis Date Noted   • Weight loss 06/14/2021   • Paresthesias 10/14/2019   • Gastroesophageal reflux disease without esophagitis 05/16/2019   • Arthritis 04/09/2018   • Mixed hyperlipidemia 04/01/2018   • Essential hypertension 04/01/2018   • Coronary artery disease involving native coronary artery of native heart without angina pectoris 04/01/2018   • Dilated aortic root (CMS/HCC) 04/01/2018   • SVT (supraventricular tachycardia) (CMS/HCC) 04/01/2018   • Pulmonary nodule 04/01/2018   • RBBB 04/01/2018   • MVP (mitral valve prolapse) 04/01/2018       Allergy  Adenosine, Anesthesia s/i-40  [propofol], Cephalosporins, Desvenlafaxine succinate, Dexbrompheniramine, Diltiazem, Escitalopram oxalate, Ezetimibe, Fluoxetine hcl, Lactase, Metoprolol, Mirtazapine, Penicillins, Plant stanol leonila, Sertraline hcl, Statins-hmg-coa reductase inhibitors, and Sulfa (sulfonamide antibiotics)    MED LIST     Current Outpatient Medications   Medication Sig Dispense Refill   • acetaminophen (TYLENOL EXTRA STRENGTH ORAL) Take by mouth as  needed.       • cholecalciferol, vitamin D3, (cholecalciferol) 1,000 unit tablet Take 1 tablet by mouth daily.     • clobetasoL (TEMOVATE) 0.05 % ointment Apply 1 application topically 2 (two) times a week (Mon, Thu).     • CYANOCOBALAMIN/FOLIC ACID (VITAMIN B12-FOLIC ACID) 500-400 mcg tablet Take 1 tablet by mouth daily.     • diclofenac sodium 1.5 % drops Apply topically.     • flaxseed oil 1,000 mg capsule Take 1 capsule by mouth as needed.       • folic acid (FOLVITE) 400 mcg tablet Take 1 tablet by mouth daily.     • Lactobac no.41-Bifidobact no.7 (PROBIOTIC-10) 70 mg (3 billion cell) capsule Take 1 capsule by mouth daily.     • lansoprazole (PREVACID SOLUTAB) 30 mg disintegrating tablet Take 30 mg by mouth daily.     • LORazepam (ATIVAN) 0.5 mg tablet Take 0.5 mg by mouth nightly as needed for anxiety.     • magnesium hydroxide (MILK OF MAGNESIA CONCENTRATED ORAL) Take by mouth once.     • multivit-min-FA-lycopen-lutein (CENTRUM SILVER) tablet Take 1 tablet by mouth daily.     • polyvinyl alcohol-povidon,PF, (REFRESH CLASSIC, PF,) 1.4-0.6 % dropperette Take 1 drop by mouth 4 (four) times a day as needed.     • potassium gluconate 595 mg (99 mg) tablet Take 1 tablet by mouth daily.     • simethicone (GAS RELIEF) 125 mg capsule Take 125 mg by mouth daily.     • simethicone (MYLANTA GAS ORAL) Take by mouth.     • thiamine (vitamin B-1) 50 mg tablet Take 50 mg by mouth daily.     • vitamin E 400 unit capsule Take 1 tablet by mouth daily.       No current facility-administered medications for this visit.        Review of Systems   Constitutional: Positive for malaise/fatigue. Negative for weight gain and weight loss.   HENT: Negative for hearing loss and hoarse voice.    Eyes: Negative for visual disturbance.   Cardiovascular: Negative for chest pain, claudication, cyanosis, dyspnea on exertion, irregular heartbeat, leg swelling, near-syncope, orthopnea, palpitations, paroxysmal nocturnal dyspnea and syncope.    Respiratory: Negative for cough, hemoptysis, shortness of breath, sleep disturbances due to breathing, snoring, sputum production and wheezing.    Endocrine: Negative for cold intolerance and heat intolerance.   Hematologic/Lymphatic: Negative.  Negative for bleeding problem. Does not bruise/bleed easily.   Skin: Negative.  Negative for rash.   Musculoskeletal: Positive for joint pain. Negative for arthritis, falls, muscle cramps and myalgias.   Gastrointestinal: Negative for abdominal pain, anorexia, change in bowel habit, constipation, diarrhea, dysphagia, heartburn, jaundice and nausea.   Genitourinary: Negative for frequency and nocturia.   Neurological: Positive for weakness. Negative for dizziness, focal weakness, headaches, light-headedness, numbness, tremors and vertigo.        Bilateral hand tingling   Psychiatric/Behavioral: Negative for memory loss. The patient does not have insomnia and is not nervous/anxious.    Allergic/Immunologic: Negative for hives.                                   No results found for: WBC, HGB, HCT, PLT, CHOL, TRIG, HDL, LDLDIRECT, TOTLDLC, LDLCALC, ALT, AST, NA, K, CL, CREATININE, BUN, CO2, TSH, INR, HGBA1C, BNP    No results found for: GLUCOSE, CALCIUM, NA, K, CO2, CL, BUN, CREATININE    Objective   Vitals:    06/14/21 0947   BP: 140/84   BP Location: Left upper arm   Patient Position: Sitting   Pulse: (!) 57   SpO2: 98%   Weight: 72.6 kg (160 lb)     Physical Exam  Constitutional:       General: She is not in acute distress.     Appearance: She is well-developed.   HENT:      Head: Normocephalic and atraumatic.      Nose: Nose normal.   Eyes:      General: No scleral icterus.     Conjunctiva/sclera: Conjunctivae normal.   Neck:      Vascular: No JVD.   Cardiovascular:      Rate and Rhythm: Normal rate and regular rhythm.      Pulses: Intact distal pulses.      Heart sounds: Normal heart sounds. No murmur heard.   No friction rub. No gallop.       Comments: 2/6 systolic  murmur  Pulmonary:      Effort: Pulmonary effort is normal. No respiratory distress.      Breath sounds: No stridor. No wheezing or rales.   Chest:      Chest wall: No tenderness.   Abdominal:      Tenderness: There is no abdominal tenderness.   Musculoskeletal:         General: No deformity.      Comments: Uses walker   Skin:     General: Skin is warm and dry.   Neurological:      Mental Status: She is alert and oriented to person, place, and time.         Cardiac Procedures  Cardiac Procedures     Imaging  CAT SCAN     CTA 3/17 AOR ROOT 5.0 PULM NODULES NO [id number]     CTA 3/17 aorga 3.9 lll pulm nodules stable from 201`12     CAT scan October 2018 aortic root 4  Thyroid nodule  Right upper lobe tubular nodule  No change 2016     Cat scan chest 9/19 rll infection? Small nodules aorta 4. u fo p      Cat scan head 5/19 small vessel disease    Cat scan dec 2020 4.2 pulm nodules stable felt to be bronchiolitis      STRESS  pharm nuc neg 9/16        ECHO  Echo  Mac mild mr aortic root 4.2 aort sclerosis mild ai no6/21 no change 2017       VASCULAR  Carotid 10/18 mild disease      CAT SCAN  Cat scan9/19 aorta 4 rll inflammation  CAT scan December 2020 1 aortic root 4.1 cm pulmonary nodule stable bronchiolitis coronary calcifications thyroid cysts    Cor cta 6/2021  Mild CAD [facility] aortic root 4.1 cm mac        ELECTROPHYSIOLOGY  SVT ablation 2006 [id number] normal                    Assessment/Plan:  Coronary artery disease involving native coronary artery of native heart without angina pectoris  She had coronary CT angiogram performed June 2021 [facility] mild CAD thoracic aorta stable 4.1 cm echo today stable aortic root 4.2 significant mitral annular calcification moderate MR preserved ejection fraction mild aortic insufficiency unchanged repeat echo 1 year does not tolerate really any cardiac medications statins etc.    Dilated aortic root (CMS/HCC)  Stable since 2017 CTA  coronary CTA June 2024.1 echo today 4.2 mild aortic insufficiency significant mitral annular calcification with moderate MR preserved ejection fraction LVH stable June 2020 1 repeat imaging with echo 1 year    Mixed hyperlipidemia  Multiple drug allergies disease declines pharmaceutical intervention LDL runs about 130    SVT (supraventricular tachycardia) (CMS/HCC) (HCC)  Ablated 2006             History of valvular disease thoracic aneurysm with extensive mitral annular calcification moderate MR thoracic aneurysm stable since 2017 last measured June 2021   repeat echo will be due June 2022 she has nonobstructive CAD coronary CT angiogram June 2021  Main issue now is weight loss she is following up with you when she sees GI physicians at [place]  Repeat echo June 2020                                      Thank you for allowing me to participate in the care of this patient.  I hope this information is helpful.    Matias Willard MD [facility]   6/14/2021  Arpan Sethi,

## 2021-06-14 ENCOUNTER — HOSPITAL ENCOUNTER (OUTPATIENT)
Dept: CARDIOLOGY | Facility: CLINIC | Age: 86
Discharge: HOME | End: 2021-06-14
Payer: MEDICARE

## 2021-06-14 ENCOUNTER — OFFICE VISIT (OUTPATIENT)
Dept: CARDIOLOGY | Facility: CLINIC | Age: 86
End: 2021-06-14
Payer: MEDICARE

## 2021-06-14 VITALS
DIASTOLIC BLOOD PRESSURE: 84 MMHG | BODY MASS INDEX: 28.34 KG/M2 | WEIGHT: 160 LBS | HEART RATE: 57 BPM | OXYGEN SATURATION: 98 % | SYSTOLIC BLOOD PRESSURE: 140 MMHG

## 2021-06-14 VITALS
BODY MASS INDEX: 34.2 KG/M2 | SYSTOLIC BLOOD PRESSURE: 135 MMHG | DIASTOLIC BLOOD PRESSURE: 80 MMHG | WEIGHT: 193 LBS | HEIGHT: 63 IN

## 2021-06-14 DIAGNOSIS — I77.810 DILATED AORTIC ROOT (CMS/HCC): Primary | ICD-10-CM

## 2021-06-14 DIAGNOSIS — I77.810 DILATED AORTIC ROOT (CMS/HCC): ICD-10-CM

## 2021-06-14 DIAGNOSIS — I10 ESSENTIAL HYPERTENSION: ICD-10-CM

## 2021-06-14 DIAGNOSIS — E78.2 MIXED HYPERLIPIDEMIA: ICD-10-CM

## 2021-06-14 DIAGNOSIS — I25.10 CORONARY ARTERY DISEASE INVOLVING NATIVE CORONARY ARTERY OF NATIVE HEART WITHOUT ANGINA PECTORIS: ICD-10-CM

## 2021-06-14 DIAGNOSIS — I47.10 SVT (SUPRAVENTRICULAR TACHYCARDIA) (CMS/HCC): ICD-10-CM

## 2021-06-14 PROBLEM — R63.4 WEIGHT LOSS: Status: ACTIVE | Noted: 2021-06-14

## 2021-06-14 LAB
AORTIC ROOT ANNULUS: 4.2 CM
ASCENDING AORTA: 4 CM
AV PEAK GRADIENT: 12 MMHG
AV PEAK VELOCITY-S: 1.7 M/S
AV VALVE AREA: 1.89 CM2
BSA FOR ECHO PROCEDURE: 1.97 M2
E WAVE DECELERATION TIME: 197 MS
E/A RATIO: 0.9
E/E' RATIO: 24.9
E/LAT E' RATIO: 26.6
EDV (BP): 68.5 CM3
EF (A4C): 56.4 %
EF A2C: 65.9 %
EJECTION FRACTION: 63.4 %
EST RIGHT VENT SYSTOLIC PRESSURE BY TRICUSPID REGURGITATION JET: 32 MMHG
ESV (BP): 25.1 CM3
FRACTIONAL SHORTENING: 23.9 %
INTERVENTRICULAR SEPTUM: 1.3 CM
LA ESV (BP): 99 CM3
LA ESV INDEX (A2C): 65.99 CM3/M2
LA ESV INDEX (BP): 50.25 CM3/M2
LA/AORTA RATIO: 0.98
LAAS-AP2: 34.7 CM2
LAAS-AP4: 22.5 CM2
LAD 2D: 4.1 CM
LALD A4C: 6.09 CM
LALD A4C: 7.24 CM
LAV-S: 130 CM3
LEFT ATRIUM VOLUME INDEX: 33.35 CM3/M2
LEFT ATRIUM VOLUME: 65.7 CM3
LEFT INTERNAL DIMENSION IN SYSTOLE: 2.71 CM (ref 2.67–4.04)
LEFT VENTRICLE DIASTOLIC VOLUME INDEX: 39.64 CM3/M2
LEFT VENTRICLE DIASTOLIC VOLUME: 78.1 CM3
LEFT VENTRICLE SYSTOLIC VOLUME INDEX: 17.31 CM3/M2
LEFT VENTRICLE SYSTOLIC VOLUME: 34.1 CM3
LEFT VENTRICULAR INTERNAL DIMENSION IN DIASTOLE: 3.56 CM (ref 4.53–6.28)
LEFT VENTRICULAR POSTERIOR WALL IN END DIASTOLE: 1.3 CM (ref 0.59–1.1)
LV DIASTOLIC VOLUME: 54.3 CM3
LV ESV (APICAL 2 CHAMBER): 18.5 CM3
LVAD-AP2: 22.3 CM2
LVAD-AP4: 24.7 CM2
LVAS-AP2: 10.6 CM2
LVAS-AP4: 15.2 CM2
LVEDVI(A2C): 27.56 CM3/M2
LVEDVI(BP): 34.77 CM3/M2
LVESVI(A2C): 9.39 CM3/M2
LVESVI(BP): 12.74 CM3/M2
LVLD-AP2: 7.34 CM
LVLD-AP4: 6.56 CM
LVLS-AP2: 5.69 CM
LVLS-AP4: 5.84 CM
LVOT 2D: 1.9 CM
LVOT A: 2.84 CM2
LVOT PEAK VELOCITY: 1.13 M/S
MITRAL VALVE MEAN INFLOW VELOCITY: 4.81 M/S
MR PISA EROA: 0.05 CM2
MR VELOCITY: 6.35 M/S
MR VTI: 220 CM
MV E'TISSUE VEL-LAT: 0.04 M/S
MV E'TISSUE VEL-MED: 0.05 M/S
MV MEAN GRADIENT: 2 MMHG
MV PEAK A VEL: 1.29 M/S
MV PEAK E VEL: 1.17 M/S
MV PEAK GRADIENT: 7 MMHG
MV REGURGITANT VOLUME: 11 CM3
MV VTI: 41.7 CM
PISA ALIAS VEL MV: 0.31 M/S
PISA RADIUS: 0.4 CM
POSTERIOR WALL: 1.3 CM
PULMONARY REGURGITATION LATE DIASTOLIC VELOCITY: 0.88 M/S
PV PEAK GRADIENT: 2 MMHG
PV PV: 0.75 M/S
RAP: 5 MMHG
RVOT VMAX: 0.73 M/S
RVOT VTI: 14 CM
SEPTAL TISSUE DOPPLER FREE WALL LATE DIA VELOCITY (APICAL 4 CHAMBER VIEW): 0.1 M/S
TR MAX PG: 27 MMHG
TRICUSPID VALVE PEAK REGURGITATION VELOCITY: 2.6 M/S
Z-SCORE OF LEFT VENTRICULAR DIMENSION IN END DIASTOLE: -4.04
Z-SCORE OF LEFT VENTRICULAR DIMENSION IN END SYSTOLE: -1.52
Z-SCORE OF LEFT VENTRICULAR POSTERIOR WALL IN END DIASTOLE: 2.48

## 2021-06-14 PROCEDURE — 99214 OFFICE O/P EST MOD 30 MIN: CPT | Performed by: INTERNAL MEDICINE

## 2021-06-14 PROCEDURE — 93306 TTE W/DOPPLER COMPLETE: CPT | Performed by: INTERNAL MEDICINE

## 2021-06-14 PROCEDURE — 93000 ELECTROCARDIOGRAM COMPLETE: CPT | Performed by: INTERNAL MEDICINE

## 2021-06-14 RX ORDER — DICLOFENAC SODIUM 16.05 MG/ML
SOLUTION TOPICAL
COMMUNITY

## 2021-06-14 RX ORDER — CLOBETASOL PROPIONATE 0.5 MG/G
1 OINTMENT TOPICAL 2 TIMES WEEKLY
COMMUNITY
Start: 2021-03-04

## 2021-06-14 NOTE — ASSESSMENT & PLAN NOTE
Stable since 2017 CTA coronary CTA June 2024.1 echo today 4.2 mild aortic insufficiency significant mitral annular calcification with moderate MR preserved ejection fraction LVH stable June 2020 1 repeat imaging with echo 1 year

## 2021-06-14 NOTE — ASSESSMENT & PLAN NOTE
She had coronary CT angiogram performed June 2021 [facility] mild CAD thoracic aorta stable 4.1 cm echo today stable aortic root 4.2 significant mitral annular calcification moderate MR preserved ejection fraction mild aortic insufficiency unchanged repeat echo 1 year does not tolerate really any cardiac medications statins etc.

## 2022-03-29 ASSESSMENT — ENCOUNTER SYMPTOMS
SYNCOPE: 0
MYALGIAS: 0
HOARSE VOICE: 0
BRUISES/BLEEDS EASILY: 0
INSOMNIA: 0
SNORING: 0
FALLS: 0
CLAUDICATION: 0
ANOREXIA: 0
COUGH: 0
MEMORY LOSS: 0
WHEEZING: 0
NERVOUS/ANXIOUS: 0
FOCAL WEAKNESS: 0
CHANGE IN BOWEL HABIT: 0
CONSTIPATION: 0
PND: 0
LIGHT-HEADEDNESS: 0
ORTHOPNEA: 0
WEIGHT GAIN: 0
DIZZINESS: 0
PALPITATIONS: 0
SLEEP DISTURBANCES DUE TO BREATHING: 0
ABDOMINAL PAIN: 0
WEAKNESS: 1
NEAR-SYNCOPE: 0
SPUTUM PRODUCTION: 0
VERTIGO: 0
HEMOPTYSIS: 0
HEARTBURN: 0
JAUNDICE: 0
IRREGULAR HEARTBEAT: 0
HEADACHES: 0
NUMBNESS: 0
TREMORS: 0
WEIGHT LOSS: 0
FREQUENCY: 0
NAUSEA: 0
SHORTNESS OF BREATH: 0
DYSPNEA ON EXERTION: 0
DIARRHEA: 0
MUSCLE CRAMPS: 0
HEMATOLOGIC/LYMPHATIC NEGATIVE: 1

## 2022-03-29 NOTE — PROGRESS NOTES
Cardiology Note    Arpan Casillas DO Lillian E Forrest is a 88 y.o. female 5/24/1933   She returns for follow-up and echocardiogram  She has nonobstructive CAD and dilated aortic root  She had coronary CT angiogram June 2021   nonobstructive coronary artery disease and aortic root was measured at 4.1 cm    Echocardiogram today April 2022 stable findings aortic root 4.1 mitral nonopacification preserved ejection fraction    She has history of remote SVT ablation in 2006    She has hyperlipidemia multiple drug allergies and declines pharmaceutical intervention for elevated LDL cholesterol her ldl is  138  Her main medical issue is GERD she cannot tolerate aspirin      Lab work March 2022  Creatinine 0.81 potassium 4.2  Cholesterol 231 HDL 65   CBC normal                               Patient Active Problem List    Diagnosis Date Noted   • Weight loss 06/14/2021   • Paresthesias 10/14/2019   • Gastroesophageal reflux disease without esophagitis 05/16/2019   • Arthritis 04/09/2018   • Mixed hyperlipidemia 04/01/2018   • Essential hypertension 04/01/2018   • Coronary artery disease involving native coronary artery of native heart without angina pectoris 04/01/2018   • Dilated aortic root (CMS/HCC) 04/01/2018   • SVT (supraventricular tachycardia) (CMS/[facility]) 04/01/2018   • Pulmonary nodule 04/01/2018   • RBBB 04/01/2018   • MVP (mitral valve prolapse) 04/01/2018       Allergy  Adenosine, Anesthesia s/i-40  [propofol], Cephalosporins, Desvenlafaxine succinate, Dexbrompheniramine, Diltiazem, Escitalopram oxalate, Ezetimibe, Fluoxetine hcl, Lactase, Metoprolol, Mirtazapine, Penicillins, Plant stanol leonila, Sertraline hcl, Statins-hmg-coa reductase inhibitors, and Sulfa (sulfonamide antibiotics)    MED LIST     Current Outpatient Medications   Medication Sig Dispense Refill   • acetaminophen (TYLENOL EXTRA STRENGTH ORAL) Take by mouth as needed.       • cholecalciferol, vitamin D3, 1,000 unit (25 mcg) tablet  Take 1 tablet by mouth daily.     • clobetasoL (TEMOVATE) 0.05 % ointment Apply 1 application topically 2 (two) times a week (Mon, Thu).     • CYANOCOBALAMIN/FOLIC ACID (VITAMIN B12-FOLIC ACID) 500-400 mcg tablet Take 1 tablet by mouth daily.     • diclofenac sodium 1.5 % drops Apply topically.     • flaxseed oil 1,000 mg capsule Take 1 capsule by mouth as needed.       • folic acid (FOLVITE) 400 mcg tablet Take 1 tablet by mouth daily.     • Lactobac no.41-Bifidobact no.7 70 mg (3 billion cell) capsule Take 1 capsule by mouth daily.     • lansoprazole (PREVACID SOLUTAB) 30 mg disintegrating tablet Take 30 mg by mouth daily.     • LORazepam (ATIVAN) 0.5 mg tablet Take 0.5 mg by mouth nightly as needed for anxiety.     • magnesium hydroxide (MILK OF MAGNESIA CONCENTRATED ORAL) Take by mouth once.     • multivit-min-FA-lycopen-lutein tablet Take 1 tablet by mouth daily.     • polyvinyl alcohol-povidon,PF, 1.4-0.6 % dropperette Take 1 drop by mouth 4 (four) times a day as needed.     • potassium gluconate 595 mg (99 mg) tablet Take 1 tablet by mouth daily.     • simethicone (MYLANTA GAS ORAL) Take by mouth.     • simethicone (MYLICON,GAS-X) 125 mg capsule Take 125 mg by mouth daily.     • thiamine 50 mg tablet Take 50 mg by mouth daily.       No current facility-administered medications for this visit.        Review of Systems   Constitutional: Positive for malaise/fatigue. Negative for weight gain and weight loss.   HENT: Negative for hearing loss and hoarse voice.    Eyes: Negative for visual disturbance.   Cardiovascular: Negative for chest pain, claudication, cyanosis, dyspnea on exertion, irregular heartbeat, leg swelling, near-syncope, orthopnea, palpitations, paroxysmal nocturnal dyspnea and syncope.   Respiratory: Negative for cough, hemoptysis, shortness of breath, sleep disturbances due to breathing, snoring, sputum production and wheezing.    Endocrine: Negative for cold intolerance and heat intolerance.    Hematologic/Lymphatic: Negative.  Negative for bleeding problem. Does not bruise/bleed easily.   Skin: Negative.  Negative for rash.   Musculoskeletal: Positive for joint pain. Negative for arthritis, falls, muscle cramps and myalgias.   Gastrointestinal: Negative for abdominal pain, anorexia, change in bowel habit, constipation, diarrhea, dysphagia, heartburn, jaundice and nausea.   Genitourinary: Negative for frequency and nocturia.   Neurological: Positive for weakness. Negative for dizziness, focal weakness, headaches, light-headedness, numbness, tremors and vertigo.        Bilateral hand tingling   Psychiatric/Behavioral: Negative for memory loss. The patient does not have insomnia and is not nervous/anxious.    Allergic/Immunologic: Negative for hives.                                   No results found for: WBC, HGB, HCT, PLT, CHOL, TRIG, HDL, LDLDIRECT, TOTLDLC, LDLCALC, ALT, AST, NA, K, CL, CREATININE, BUN, CO2, TSH, INR, HGBA1C, BNP    No results found for: GLUCOSE, CALCIUM, NA, K, CO2, CL, BUN, CREATININE    Objective   Vitals:    04/04/22 0930   BP: 130/84   BP Location: Right upper arm   Patient Position: Sitting   Pulse: 61   SpO2: 96%     Physical Exam  Constitutional:       General: She is not in acute distress.     Appearance: She is well-developed.   HENT:      Head: Normocephalic and atraumatic.      Nose: Nose normal.   Eyes:      General: No scleral icterus.     Conjunctiva/sclera: Conjunctivae normal.   Neck:      Vascular: No JVD.   Cardiovascular:      Rate and Rhythm: Normal rate and regular rhythm.      Pulses: Intact distal pulses.      Heart sounds: Normal heart sounds. No murmur heard.    No friction rub. No gallop.      Comments: 2/6 systolic murmur  Pulmonary:      Effort: Pulmonary effort is normal. No respiratory distress.      Breath sounds: No stridor. No wheezing or rales.   Chest:      Chest wall: No tenderness.   Abdominal:      Tenderness: There is no abdominal tenderness.    Musculoskeletal:         General: No deformity.      Comments: Uses walker   Skin:     General: Skin is warm and dry.   Neurological:      Mental Status: She is alert and oriented to person, place, and time.         Cardiac Procedures  Cardiac Procedures     Imaging  CAT SCAN     CTA 3/17 AOR ROOT 5.0 PULM NODULES NO [id number]     CTA 3/17 aorga 3.9 lll pulm nodules stable from 201`12     CAT scan October 2018 aortic root 4  Thyroid nodule  Right upper lobe tubular nodule  No change 2016     Cat scan chest 9/19 rll infection? Small nodules aorta 4. u fo p      Cat scan head 5/19 small vessel disease    Cat scan dec 2020 4.2 pulm nodules stable felt to be bronchiolitis      STRESS  pharm nuc neg 9/16        ECHO  Echo 4/2022  Mac mild mr aortic root 4.2 aort sclerosis mild ai no6/21 no change 2017       VASCULAR  Carotid 10/18 mild disease      CAT SCAN  Cat scan9/19 aorta 4 rll inflammation  CAT scan December 2020 1 aortic root 4.1 cm pulmonary nodule stable bronchiolitis coronary calcifications thyroid cysts    Cor cta 6/2021  Mild CAD [facility] aortic root 4.1 cm mac  CORONARY ANGIOGRAPHY:   RIGHT CORONARY ARTERY: Scattered calcified plaque with no significant stenosis. The conus branch arises separately from the right sinus of Valsalva, an anatomic variant.   The LAD and circumflex individually originate off the left sinus of Valsalva, an anatomic variant.   LEFT ANTERIOR DESCENDING: Scattered mixed calcified and noncalcified plaque with no significant stenosis.   LEFT CIRCUMFLEX: Scattered calcified plaque or significant stenosis. The SA gonzález artery arises from the circumflex coronary artery.     The coronary arteries are codominant.     FUNCTIONAL ANALYSIS   Functional analysis was not performed as this study was acquired using prospective triggering to reduce radiation dose.     ADDITIONAL FINDINGS:   *  Mild enlargement of the aortic root to 4.1 cm.   *  Dilated right atrium. Severe  mitral annular calcifications. Calcifications of the aortic valve, root and descending thoracic aorta.   *  Bibasilar subsegmental atelectasis. Interval decrease in clustered micronodules in the anterior right upper lobe, in keeping with sequela from bronchiolitis (series 301, images 6-7).         ELECTROPHYSIOLOGY  SVT ablation 2006      j    April 2022                          Assessment/Plan:  Dilated aortic root (CMS/HCC)  Echocardiogram today April 2022 4.1 cm stable from 2017 mitral annular calcification moderate mitral regurgitation preserved ejection fraction repeat in 1 year aortic sclerosis normal trileaflet aortic valve    MVP (mitral valve prolapse)  Extensive mitral annular calcification with prolapse moderate mitral regurgitation preserved ejection fraction echo April 2022    Coronary artery disease involving native coronary artery of native heart without angina pectoris  Coronary CT angiogram done [facility] June 2021 mild nonobstructive disease aortic root was 4.1 on that study again on no therapy cannot tolerate aspirin or statins no symptoms of angina    SVT (supraventricular tachycardia) (CMS/HCC) (HCC)  Ablated 2006    Mixed hyperlipidemia  Cholesterol 230  cannot tolerate any medications on no therapies             History of valvular disease extensive mitral annular calcification mild to moderate 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  I hope this information is helpful.    Matias Willard MD [facility]   4/4/2022  Arpan Sethi  DO

## 2022-04-04 ENCOUNTER — HOSPITAL ENCOUNTER (OUTPATIENT)
Dept: CARDIOLOGY | Facility: CLINIC | Age: 87
Discharge: HOME | End: 2022-04-04
Payer: MEDICARE

## 2022-04-04 ENCOUNTER — OFFICE VISIT (OUTPATIENT)
Dept: CARDIOLOGY | Facility: CLINIC | Age: 87
End: 2022-04-04
Payer: MEDICARE

## 2022-04-04 VITALS — DIASTOLIC BLOOD PRESSURE: 84 MMHG | OXYGEN SATURATION: 96 % | HEART RATE: 61 BPM | SYSTOLIC BLOOD PRESSURE: 130 MMHG

## 2022-04-04 VITALS
SYSTOLIC BLOOD PRESSURE: 140 MMHG | HEIGHT: 63 IN | DIASTOLIC BLOOD PRESSURE: 84 MMHG | WEIGHT: 160 LBS | BODY MASS INDEX: 28.35 KG/M2

## 2022-04-04 DIAGNOSIS — I34.1 MVP (MITRAL VALVE PROLAPSE): ICD-10-CM

## 2022-04-04 DIAGNOSIS — I77.810 DILATED AORTIC ROOT (CMS/HCC): ICD-10-CM

## 2022-04-04 DIAGNOSIS — I10 ESSENTIAL HYPERTENSION: ICD-10-CM

## 2022-04-04 DIAGNOSIS — I25.10 CORONARY ARTERY DISEASE INVOLVING NATIVE CORONARY ARTERY OF NATIVE HEART WITHOUT ANGINA PECTORIS: Primary | ICD-10-CM

## 2022-04-04 LAB
AORTIC ROOT ANNULUS: 4.1 CM
AORTIC VALVE MEAN VELOCITY: 1.34 M/S
AORTIC VALVE VELOCITY TIME INTEGRAL: 46.8 CM
ASCENDING AORTA: 3.9 CM
AV MEAN GRADIENT: 8 MMHG
AV PEAK GRADIENT: 15 MMHG
AV PEAK VELOCITY-S: 1.95 M/S
AV REG PEAK VEL: 3.02 M/S
AV REGURGITATION PRESSURE HALF TIME: 635 MS
AV VALVE AREA: 1.69 CM2
BSA FOR ECHO PROCEDURE: 1.8 M2
DOP CALC LVOT STROKE VOLUME: 70.85 ML
E WAVE DECELERATION TIME: 243 MS
E/A RATIO: 1.3
E/E' RATIO: 20.2
E/LAT E' RATIO: 16.8
EDV (BP): 76.9 CM3
EF (A4C): 61.2 %
EF A2C: 67.3 %
EJECTION FRACTION: 62 %
EST RIGHT VENT SYSTOLIC PRESSURE BY TRICUSPID REGURGITATION JET: 45 MMHG
ESV (BP): 29.2 CM3
FRACTIONAL SHORTENING: 32.3 %
INTERVENTRICULAR SEPTUM: 1.18 CM
LA ESV (BP): 61 CM3
LA ESV INDEX (A2C): 33.44 CM3/M2
LA ESV INDEX (BP): 33.89 CM3/M2
LA/AORTA RATIO: 0.88
LAAS-AP2: 20.4 CM2
LAAS-AP4: 19.6 CM2
LAD 2D: 3.6 CM
LALD A4C: 5.29 CM
LALD A4C: 5.82 CM
LAV-S: 60.2 CM3
LEFT ATRIUM VOLUME INDEX: 31.72 CM3/M2
LEFT ATRIUM VOLUME: 57.1 CM3
LEFT INTERNAL DIMENSION IN SYSTOLE: 2.94 CM (ref 2.45–3.7)
LEFT VENTRICLE DIASTOLIC VOLUME INDEX: 44 CM3/M2
LEFT VENTRICLE DIASTOLIC VOLUME: 79.2 CM3
LEFT VENTRICLE SYSTOLIC VOLUME INDEX: 17.06 CM3/M2
LEFT VENTRICLE SYSTOLIC VOLUME: 30.7 CM3
LEFT VENTRICULAR INTERNAL DIMENSION IN DIASTOLE: 4.34 CM (ref 4.13–5.73)
LEFT VENTRICULAR POSTERIOR WALL IN END DIASTOLE: 1.19 CM (ref 0.54–1.01)
LV DIASTOLIC VOLUME: 73.7 CM3
LV ESV (APICAL 2 CHAMBER): 24.1 CM3
LVAD-AP2: 25.7 CM2
LVAD-AP4: 26.6 CM2
LVAS-AP2: 12.5 CM2
LVAS-AP4: 15.2 CM2
LVEDVI(A2C): 40.94 CM3/M2
LVEDVI(BP): 42.72 CM3/M2
LVESVI(A2C): 13.39 CM3/M2
LVESVI(BP): 16.22 CM3/M2
LVLD-AP2: 7.61 CM
LVLD-AP4: 7.45 CM
LVLS-AP2: 5.62 CM
LVLS-AP4: 6.46 CM
LVOT 2D: 1.9 CM
LVOT A: 2.84 CM2
LVOT MG: 2 MMHG
LVOT MV: 0.69 M/S
LVOT PEAK VELOCITY: 1.16 M/S
LVOT VTI: 25 CM
MITRAL VALVE MEAN INFLOW VELOCITY: 4.44 M/S
MR VELOCITY: 5.98 M/S
MR VTI: 219 CM
MV E'TISSUE VEL-LAT: 0.08 M/S
MV E'TISSUE VEL-MED: 0.07 M/S
MV PEAK A VEL: 1.02 M/S
MV PEAK E VEL: 1.31 M/S
POSTERIOR WALL: 1.19 CM
RVOT VMAX: 0.59 M/S
SEPTAL TISSUE DOPPLER FREE WALL LATE DIA VELOCITY (APICAL 4 CHAMBER VIEW): 0.12 M/S
TR MAX PG: 37 MMHG
TRICUSPID VALVE PEAK REGURGITATION VELOCITY: 3.03 M/S
Z-SCORE OF LEFT VENTRICULAR DIMENSION IN END DIASTOLE: -1.15
Z-SCORE OF LEFT VENTRICULAR DIMENSION IN END SYSTOLE: -0.19
Z-SCORE OF LEFT VENTRICULAR POSTERIOR WALL IN END DIASTOLE: 2.49

## 2022-04-04 PROCEDURE — 93000 ELECTROCARDIOGRAM COMPLETE: CPT | Performed by: INTERNAL MEDICINE

## 2022-04-04 PROCEDURE — 99214 OFFICE O/P EST MOD 30 MIN: CPT | Performed by: INTERNAL MEDICINE

## 2022-04-04 PROCEDURE — 93306 TTE W/DOPPLER COMPLETE: CPT | Performed by: INTERNAL MEDICINE

## 2022-04-04 NOTE — ASSESSMENT & PLAN NOTE
Coronary CT angiogram done [facility] June 2021 mild nonobstructive disease aortic root was 4.1 on that study again on no therapy cannot tolerate aspirin or statins no symptoms of angina

## 2022-04-04 NOTE — ASSESSMENT & PLAN NOTE
Echocardiogram today April 2022 4.1 cm stable from 2017 mitral annular calcification moderate mitral regurgitation preserved ejection fraction repeat in 1 year aortic sclerosis normal trileaflet aortic valve

## 2022-04-04 NOTE — ASSESSMENT & PLAN NOTE
Extensive mitral annular calcification with prolapse moderate mitral regurgitation preserved ejection fraction echo April 2022

## 2022-04-05 ENCOUNTER — TELEPHONE (OUTPATIENT)
Dept: SCHEDULING | Facility: CLINIC | Age: 87
End: 2022-04-05
Payer: MEDICARE

## 2023-03-19 NOTE — PROGRESS NOTES
Intensive lipid-lowering candidate.  LDL less than 70 mg/dL.   There are no Wet Read(s) to document.

## 2023-03-28 NOTE — PROGRESS NOTES
Cardiology Note    Arpan Casillas DO Lillian E Forrest is a 89 y.o. female 5/24/1933     She returns for follow-up and echocardiogram  She has mild dilatation of aortic root , mitral valve prolapse with moderate mitral regurgitation  Echocardiogram today April 2023 Stable t findings dilated aortic root 4.2 significant mitral annular calcification with moderate regurgitation preserved ejection fraction  She has nonobstructive CAD diagnosed by coronary CT angiogram  10/28/2021  She has a History of SVT ablation 2006  Mixed hyperlipidemia   She cannot tolerate multiple medications, including statins, Zetia declines other therapies that we have discussed in the past  She gets occ cp lasts  Seconds   Gets occ edema did not tolerate lasix in the past gi sided effects       Lab work April 2023  Creatinine 0.91 potassium 4.4  Cholesterol 215 triglycerides 95 LDL cholesterol 135  CBC normal                                                                                                  Patient Active Problem List    Diagnosis Date Noted   • Paresthesias 10/14/2019   • Gastroesophageal reflux disease without esophagitis 05/16/2019   • Arthritis 04/09/2018   • Mixed hyperlipidemia 04/01/2018   • Essential hypertension 04/01/2018   • Coronary artery disease involving native coronary artery of native heart without angina pectoris 04/01/2018   • Dilated aortic root (CMS/HCC) 04/01/2018   • SVT (supraventricular tachycardia) (CMS/[facility]) 04/01/2018   • Pulmonary nodule 04/01/2018   • RBBB 04/01/2018   • MVP (mitral valve prolapse) 04/01/2018       Allergy  Adenosine, Anesthesia s/i-40  [propofol], Cephalosporins, Desvenlafaxine succinate, Dexbrompheniramine, Diltiazem, Escitalopram oxalate, Ezetimibe, Fluoxetine hcl, Lactase, Metoprolol, Mirtazapine, Penicillins, Plant stanol leonila, Sertraline hcl, Statins-hmg-coa reductase inhibitors, and Sulfa (sulfonamide antibiotics)    MED LIST     Current Outpatient Medications    Medication Sig Dispense Refill   • acetaminophen (TYLENOL EXTRA STRENGTH ORAL) Take by mouth as needed.       • cholecalciferol, vitamin D3, 1,000 unit (25 mcg) tablet Take 1 tablet by mouth daily.     • clobetasoL (TEMOVATE) 0.05 % ointment Apply 1 application topically 2 (two) times a week (Mon, Thu).     • CYANOCOBALAMIN/FOLIC ACID (VITAMIN B12-FOLIC ACID) 500-400 mcg tablet Take 1 tablet by mouth daily.     • diclofenac sodium 1.5 % drops Apply topically.     • flaxseed oil 1,000 mg capsule Take 1 capsule by mouth as needed.       • folic acid (FOLVITE) 400 mcg tablet Take 1 tablet by mouth daily.     • Lactobac no.41-Bifidobact no.7 70 mg (3 billion cell) capsule Take 1 capsule by mouth daily.     • lansoprazole (PREVACID SOLUTAB) 30 mg disintegrating tablet Take 30 mg by mouth daily.     • LORazepam (ATIVAN) 0.5 mg tablet Take 0.5 mg by mouth nightly as needed for anxiety.     • magnesium hydroxide (MILK OF MAGNESIA CONCENTRATED ORAL) Take by mouth once.     • multivit-min-FA-lycopen-lutein tablet Take 1 tablet by mouth daily.     • polyvinyl alcohol-povidon,PF, 1.4-0.6 % dropperette Take 1 drop by mouth 4 (four) times a day as needed.     • potassium gluconate 595 mg (99 mg) tablet Take 1 tablet by mouth daily.     • RESTASIS 0.05 % ophthalmic emulsion INSTILL ONE DROP INTO BOTH EYES TWICE A DAY     • simethicone (MYLANTA GAS ORAL) Take by mouth.     • simethicone (MYLICON,GAS-X) 125 mg capsule Take 125 mg by mouth daily.     • thiamine 50 mg tablet Take 50 mg by mouth daily.       No current facility-administered medications for this visit.        Review of Systems                                   No results found for: WBC, HGB, HCT, PLT, CHOL, TRIG, HDL, LDLDIRECT, TOTLDLC, LDLCALC, ALT, AST, NA, K, CL, CREATININE, BUN, CO2, TSH, INR, HGBA1C, BNP    No results found for: GLUCOSE, CALCIUM, NA, K, CO2, CL, BUN, CREATININE    Objective   Vitals:    04/06/23 0941 04/06/23 0954   BP: (!) 160/90 140/80   BP  "Location: Left upper arm    Patient Position: Sitting    Pulse: 66    Resp: 18    SpO2: 99%    Weight: 70.8 kg (156 lb)    Height: 1.6 m (5' 3\")      Physical Exam  Constitutional:       General: She is not in acute distress.     Appearance: She is well-developed.   HENT:      Head: Normocephalic and atraumatic.      Nose: Nose normal.   Eyes:      General: No scleral icterus.     Conjunctiva/sclera: Conjunctivae normal.   Neck:      Vascular: No JVD.   Cardiovascular:      Rate and Rhythm: Normal rate and regular rhythm.      Pulses: Intact distal pulses.      Heart sounds: Normal heart sounds. No murmur heard.     No friction rub. No gallop.      Comments: 2/6 systolic murmur  Pulmonary:      Effort: Pulmonary effort is normal. No respiratory distress.      Breath sounds: No stridor. No wheezing or rales.   Chest:      Chest wall: No tenderness.   Abdominal:      Tenderness: There is no abdominal tenderness.   Musculoskeletal:         General: No deformity.      Comments: Uses walker   Skin:     General: Skin is warm and dry.   Neurological:      Mental Status: She is alert and oriented to person, place, and time.         Cardiac Procedures  Cardiac Procedures     Imaging  CAT SCAN     CTA 3/17 AOR ROOT 5.0 PULM NODULES NO [id number]     CTA 3/17 aorga 3.9 lll pulm nodules stable from 201`12     CAT scan October 2018 aortic root 4  Thyroid nodule  Right upper lobe tubular nodule  No change 2016     Cat scan chest 9/19 rll infection? Small nodules aorta 4. u fo p      Cat scan head 5/19 small vessel disease    Cat scan dec 2020 4.2 pulm nodules stable felt to be bronchiolitis      STRESS  pharm nuc neg 9/16        ECHO  Echo 4/2022  Mac mild mr aortic root 4.2 aort sclerosis mild ai no6/21 no change 2017       Echo April 2023  •  Left Ventricle: Normal ventricle size. Mild to moderate concentric left ventricular hypertrophy. No outflow tract obstruction present. Estimated EF 65-70%. No regional wall motion " abnormalities. Grade II diastolic dysfunction.  •  Aorta: Dilatation of the aortic root. Dilatation of the ascending aorta.  4.2 cm  •  Left Atrium: Severely dilated atrium.  •  Right Ventricle: Normal ventricle size. Normal systolic function.  •  Right Atrium: Mildly dilated atrium.  •  Aortic Valve: Tricuspid valve.  Sclerotic leaflets. Mild regurgitation. No stenosis.  •  Mitral Valve: Posterior leaflet thickening. Severe mitral annular calcification. Moderate regurgitation.  •  Tricuspid Valve: Normal structure. Moderate to severe regurgitation. Estimated RVSP = 36 mmHg.    VASCULAR  Carotid 10/18 mild disease      CAT SCAN  Cat scan9/19 aorta 4 rll inflammation  CAT scan December 2020 1 aortic root 4.1 cm pulmonary nodule stable bronchiolitis coronary calcifications thyroid cysts    Cor cta 6/2021  Mild CAD [facility] aortic root 4.1 cm mac  CORONARY ANGIOGRAPHY:   RIGHT CORONARY ARTERY: Scattered calcified plaque with no significant stenosis. The conus branch arises separately from the right sinus of Valsalva, an anatomic variant.   The LAD and circumflex individually originate off the left sinus of Valsalva, an anatomic variant.   LEFT ANTERIOR DESCENDING: Scattered mixed calcified and noncalcified plaque with no significant stenosis.   LEFT CIRCUMFLEX: Scattered calcified plaque or significant stenosis. The SA gonzález artery arises from the circumflex coronary artery.     The coronary arteries are codominant.     FUNCTIONAL ANALYSIS   Functional analysis was not performed as this study was acquired using prospective triggering to reduce radiation dose.     ADDITIONAL FINDINGS:   *  Mild enlargement of the aortic root to 4.1 cm.   *  Dilated right atrium. Severe mitral annular calcifications. Calcifications of the aortic valve, root and descending thoracic aorta.   *  Bibasilar subsegmental atelectasis. Interval decrease in clustered micronodules in the anterior right upper lobe, in keeping with  sequela from bronchiolitis (series 301, images 6-7).         ELECTROPHYSIOLOGY  SVT ablation 2006      j right bundle branch block inferior T wave inversions no change    April 2022                          Assessment/Plan:  Dilated aortic root (CMS/HCC)  Echocardiogram today April 2022 stable 4.2 cm no significant change from 2017 trileaflet aortic valve mild aortic insufficiency reserved LV systolic function extensive mitral annular calcification moderate mitral regurgitation top normal pulmonary artery pressures    Coronary artery disease involving native coronary artery of native heart without angina pectoris  Nonobstructive CAD coronary CT angiogram [facility] June 2021 aortic root without study 4.1 she has atypical chest pain lasting for seconds normal LV systolic function moderate large regurgitation preserved ejection fraction 65 to 70%    MVP (mitral valve prolapse)  Echocardiogram today April 2020 3 aortic root stable 4.2 cm significant mitral annular calcification moderate large regurgitation pulmonary artery pressure 40 ejection fraction 65% she has occasional edema at declines diuretic therapy said she did not tolerate it from a GI standpoint.  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  She is basically on no cardiac medications follow-up in 1 year with echocardiogram keeping an eye on her  aortic root size                        This letter was generated using speech recognition software.  Please excuse any typographical errors.        Matias Willard MD [facility]   4/6/2023  Cc Arpan Casillas, DO

## 2023-04-06 ENCOUNTER — HOSPITAL ENCOUNTER (OUTPATIENT)
Dept: CARDIOLOGY | Facility: CLINIC | Age: 88
Discharge: HOME | End: 2023-04-06
Payer: MEDICARE

## 2023-04-06 ENCOUNTER — OFFICE VISIT (OUTPATIENT)
Dept: CARDIOLOGY | Facility: CLINIC | Age: 88
End: 2023-04-06
Payer: MEDICARE

## 2023-04-06 VITALS
HEIGHT: 63 IN | HEART RATE: 66 BPM | WEIGHT: 156 LBS | BODY MASS INDEX: 27.64 KG/M2 | OXYGEN SATURATION: 99 % | SYSTOLIC BLOOD PRESSURE: 140 MMHG | DIASTOLIC BLOOD PRESSURE: 80 MMHG | RESPIRATION RATE: 18 BRPM

## 2023-04-06 VITALS
WEIGHT: 156 LBS | DIASTOLIC BLOOD PRESSURE: 75 MMHG | SYSTOLIC BLOOD PRESSURE: 140 MMHG | HEIGHT: 63 IN | BODY MASS INDEX: 27.64 KG/M2

## 2023-04-06 DIAGNOSIS — I25.10 CORONARY ARTERY DISEASE INVOLVING NATIVE CORONARY ARTERY OF NATIVE HEART WITHOUT ANGINA PECTORIS: Primary | ICD-10-CM

## 2023-04-06 DIAGNOSIS — I47.10 SVT (SUPRAVENTRICULAR TACHYCARDIA) (CMS/HCC): ICD-10-CM

## 2023-04-06 DIAGNOSIS — I77.810 DILATED AORTIC ROOT (CMS/HCC): ICD-10-CM

## 2023-04-06 DIAGNOSIS — I34.1 MVP (MITRAL VALVE PROLAPSE): ICD-10-CM

## 2023-04-06 PROBLEM — R63.4 WEIGHT LOSS: Status: RESOLVED | Noted: 2021-06-14 | Resolved: 2023-04-06

## 2023-04-06 LAB
AORTIC ROOT ANNULUS: 4.2 CM
ASCENDING AORTA: 4 CM
AV PEAK GRADIENT: 14 MMHG
AV PEAK VELOCITY-S: 1.89 M/S
AV VALVE AREA: 1.49 CM2
BSA FOR ECHO PROCEDURE: 1.77 M2
E WAVE DECELERATION TIME: 239 MS
E/A RATIO: 1.5
E/E' RATIO: 22.3
E/LAT E' RATIO: 21.9
EDV (BP): 91.8 CM3
EF (A4C): 66.2 %
EF A2C: 73.1 %
EJECTION FRACTION: 69.3 %
EST RIGHT VENT SYSTOLIC PRESSURE BY TRICUSPID REGURGITATION JET: 36 MMHG
ESV (BP): 28.2 CM3
FRACTIONAL SHORTENING: 30.98 %
INTERVENTRICULAR SEPTUM: 1.25 CM
LA ESV (BP): 124 CM3
LA ESV INDEX (A2C): 71.19 CM3/M2
LA ESV INDEX (BP): 70.06 CM3/M2
LA/AORTA RATIO: 1.05
LAAS-AP2: 33.6 CM2
LAAS-AP4: 30.8 CM2
LAD 2D: 4.4 CM
LALD A4C: 6.73 CM
LALD A4C: 7.23 CM
LAV-S: 126 CM3
LEFT ATRIUM VOLUME INDEX: 64.41 CM3/M2
LEFT ATRIUM VOLUME: 114 CM3
LEFT INTERNAL DIMENSION IN SYSTOLE: 2.74 CM (ref 2.42–3.67)
LEFT VENTRICLE DIASTOLIC VOLUME INDEX: 52.37 CM3/M2
LEFT VENTRICLE DIASTOLIC VOLUME: 92.7 CM3
LEFT VENTRICLE SYSTOLIC VOLUME INDEX: 17.68 CM3/M2
LEFT VENTRICLE SYSTOLIC VOLUME: 31.3 CM3
LEFT VENTRICULAR INTERNAL DIMENSION IN DIASTOLE: 3.97 CM (ref 4.09–5.68)
LEFT VENTRICULAR POSTERIOR WALL IN END DIASTOLE: 1.36 CM (ref 0.54–1)
LV DIASTOLIC VOLUME: 81.7 CM3
LV ESV (APICAL 2 CHAMBER): 22.1 CM3
LVAD-AP2: 27.7 CM2
LVAD-AP4: 28.8 CM2
LVAS-AP2: 13 CM2
LVAS-AP4: 13.9 CM2
LVEDVI(A2C): 46.16 CM3/M2
LVEDVI(BP): 51.86 CM3/M2
LVESVI(A2C): 12.49 CM3/M2
LVESVI(BP): 15.93 CM3/M2
LVLD-AP2: 7.83 CM
LVLD-AP4: 6.94 CM
LVLS-AP2: 6.45 CM
LVLS-AP4: 5.55 CM
LVOT 2D: 2 CM
LVOT A: 3.14 CM2
LVOT PEAK VELOCITY: 1.14 M/S
LVOT PG: 5 MMHG
MITRAL VALVE MEAN INFLOW VELOCITY: 4.57 M/S
MLH CV ECHO AVA INDEX VELOCITY RATIO: 0.8
MR FLOW: 1.98 ML/S
MR PISA EROA: 0.32 CM2
MR VELOCITY: 6.22 M/S
MR VTI: 225 CM
MV D: 4.7 CM
MV E'TISSUE VEL-LAT: 0.06 M/S
MV E'TISSUE VEL-MED: 0.06 M/S
MV MEAN GRADIENT: 3 MMHG
MV PEAK A VEL: 0.91 M/S
MV PEAK E VEL: 1.36 M/S
MV PEAK GRADIENT: 7 MMHG
MV REGURGITANT FRACTION: 9 %
MV REGURGITANT VOLUME: 71.76 CM3
MV VTI: 44.6 CM
MVA (PISA): 5.09 CM2
PISA ALIAS VEL MV: 0.39 M/S
PISA RADIUS: 0.9 CM
POSTERIOR WALL: 1.36 CM
PV PEAK GRADIENT: 3 MMHG
PV PV: 0.79 M/S
RAP: 10 MMHG
RVOT VMAX: 0.7 M/S
RVOT VTI: 17 CM
SEPTAL TISSUE DOPPLER FREE WALL LATE DIA VELOCITY (APICAL 4 CHAMBER VIEW): 0.11 M/S
TR MAX PG: 26.42 MMHG
TRICUSPID VALVE PEAK REGURGITATION VELOCITY: 2.57 M/S
Z-SCORE OF LEFT VENTRICULAR DIMENSION IN END DIASTOLE: -1.93
Z-SCORE OF LEFT VENTRICULAR DIMENSION IN END SYSTOLE: -0.67
Z-SCORE OF LEFT VENTRICULAR POSTERIOR WALL IN END DIASTOLE: 3.24

## 2023-04-06 PROCEDURE — 99214 OFFICE O/P EST MOD 30 MIN: CPT | Performed by: INTERNAL MEDICINE

## 2023-04-06 PROCEDURE — 93000 ELECTROCARDIOGRAM COMPLETE: CPT | Performed by: INTERNAL MEDICINE

## 2023-04-06 PROCEDURE — 93306 TTE W/DOPPLER COMPLETE: CPT | Performed by: INTERNAL MEDICINE

## 2023-04-06 RX ORDER — CYCLOSPORINE 0.5 MG/ML
EMULSION OPHTHALMIC
COMMUNITY
Start: 2023-02-10 | End: 2023-06-27 | Stop reason: ENTERED-IN-ERROR

## 2023-04-06 NOTE — ASSESSMENT & PLAN NOTE
Nonobstructive CAD coronary CT angiogram [facility] June 2021 aortic root without study 4.1 she has atypical chest pain lasting for seconds normal LV systolic function moderate large regurgitation preserved ejection fraction 65 to 70%

## 2023-04-06 NOTE — ASSESSMENT & PLAN NOTE
Echocardiogram today April 2020 3 aortic root stable 4.2 cm significant mitral annular calcification moderate large regurgitation pulmonary artery pressure 40 ejection fraction 65% she has occasional edema at declines diuretic therapy said she did not tolerate it from a GI standpoint.  She has multiple drug intolerances   Swelling that continues/Bleeding that does not stop

## 2023-04-06 NOTE — ASSESSMENT & PLAN NOTE
Echocardiogram today April 2022 stable 4.2 cm no significant change from 2017 trileaflet aortic valve mild aortic insufficiency reserved LV systolic function extensive mitral annular calcification moderate mitral regurgitation top normal pulmonary artery pressures

## 2023-05-31 ENCOUNTER — APPOINTMENT (RX ONLY)
Dept: URBAN - METROPOLITAN AREA CLINIC 374 | Facility: CLINIC | Age: 89
Setting detail: DERMATOLOGY
End: 2023-05-31

## 2023-05-31 DIAGNOSIS — L24.9 IRRITANT CONTACT DERMATITIS, UNSPECIFIED CAUSE: ICD-10-CM

## 2023-05-31 DIAGNOSIS — L82.1 OTHER SEBORRHEIC KERATOSIS: ICD-10-CM

## 2023-05-31 DIAGNOSIS — L57.8 OTHER SKIN CHANGES DUE TO CHRONIC EXPOSURE TO NONIONIZING RADIATION: ICD-10-CM

## 2023-05-31 DIAGNOSIS — L30.8 OTHER SPECIFIED DERMATITIS: ICD-10-CM

## 2023-05-31 DIAGNOSIS — D485 NEOPLASM OF UNCERTAIN BEHAVIOR OF SKIN: ICD-10-CM

## 2023-05-31 DIAGNOSIS — D18.0 HEMANGIOMA: ICD-10-CM

## 2023-05-31 PROBLEM — D48.5 NEOPLASM OF UNCERTAIN BEHAVIOR OF SKIN: Status: ACTIVE | Noted: 2023-05-31

## 2023-05-31 PROBLEM — D18.01 HEMANGIOMA OF SKIN AND SUBCUTANEOUS TISSUE: Status: ACTIVE | Noted: 2023-05-31

## 2023-05-31 PROCEDURE — ? COUNSELING

## 2023-05-31 PROCEDURE — ? PRESCRIPTION MEDICATION MANAGEMENT

## 2023-05-31 PROCEDURE — ? FULL BODY SKIN EXAM

## 2023-05-31 PROCEDURE — ? BIOPSY BY SHAVE METHOD

## 2023-05-31 PROCEDURE — 11102 TANGNTL BX SKIN SINGLE LES: CPT

## 2023-05-31 PROCEDURE — 99213 OFFICE O/P EST LOW 20 MIN: CPT | Mod: 25

## 2023-05-31 PROCEDURE — ? PRESCRIPTION

## 2023-05-31 RX ORDER — HALOBETASOL PROPIONATE 0.5 MG/G
OINTMENT TOPICAL
Qty: 50 | Refills: 2 | Status: ERX

## 2023-05-31 RX ORDER — HYDROCORTISONE 25 MG/G
CREAM TOPICAL BID
Qty: 30 | Refills: 1 | Status: ERX | COMMUNITY
Start: 2023-05-31

## 2023-05-31 RX ADMIN — HYDROCORTISONE: 25 CREAM TOPICAL at 00:00

## 2023-05-31 ASSESSMENT — LOCATION DETAILED DESCRIPTION DERM
LOCATION DETAILED: MIDDLE STERNUM
LOCATION DETAILED: LEFT MEDIAL MALAR CHEEK
LOCATION DETAILED: EPIGASTRIC SKIN
LOCATION DETAILED: SUBXIPHOID
LOCATION DETAILED: RIGHT INFERIOR CENTRAL MALAR CHEEK
LOCATION DETAILED: RIGHT MEDIAL UPPER BACK
LOCATION DETAILED: RIGHT SUPERIOR MEDIAL UPPER BACK
LOCATION DETAILED: RIGHT PROXIMAL PRETIBIAL REGION
LOCATION DETAILED: LEFT SUPERIOR UPPER BACK
LOCATION DETAILED: RIGHT INFERIOR UPPER BACK
LOCATION DETAILED: LEFT PROXIMAL PRETIBIAL REGION

## 2023-05-31 ASSESSMENT — LOCATION ZONE DERM
LOCATION ZONE: FACE
LOCATION ZONE: LEG
LOCATION ZONE: TRUNK

## 2023-05-31 ASSESSMENT — LOCATION SIMPLE DESCRIPTION DERM
LOCATION SIMPLE: ABDOMEN
LOCATION SIMPLE: LEFT UPPER BACK
LOCATION SIMPLE: RIGHT CHEEK
LOCATION SIMPLE: RIGHT PRETIBIAL REGION
LOCATION SIMPLE: CHEST
LOCATION SIMPLE: LEFT CHEEK
LOCATION SIMPLE: LEFT PRETIBIAL REGION
LOCATION SIMPLE: RIGHT UPPER BACK

## 2023-05-31 NOTE — PROCEDURE: BIOPSY BY SHAVE METHOD
Detail Level: Detailed
Depth Of Biopsy: dermis
Was A Bandage Applied: Yes
Size Of Lesion In Cm: 0.6
X Size Of Lesion In Cm: 0
Biopsy Type: H and E
Biopsy Method: Dermablade
Anesthesia Type: 1% lidocaine with epinephrine
Anesthesia Volume In Cc (Will Not Render If 0): 1
Hemostasis: Electrodesiccation
Wound Care: Petrolatum
Dressing: bandage
Curettage Text: The wound bed was treated with curettage after the biopsy was performed.
Cryotherapy Text: The wound bed was treated with cryotherapy after the biopsy was performed.
Electrodesiccation Text: The wound bed was treated with electrodesiccation after the biopsy was performed.
Electrodesiccation And Curettage Text: The wound bed was treated with electrodesiccation and curettage after the biopsy was performed.
Silver Nitrate Text: The wound bed was treated with silver nitrate after the biopsy was performed.
Lab: 6
Lab Facility: [id number]
Render Path Notes In Note?: No
Consent: Written consent was obtained and risks were reviewed including but not limited to scarring, infection, bleeding, scabbing, incomplete removal, nerve damage and allergy to anesthesia.
Post-Care Instructions: I reviewed with the patient in detail post-care instructions. Patient is to keep the biopsy site dry overnight, and then apply bacitracin twice daily until healed. Patient may apply hydrogen peroxide soaks to remove any crusting.
Notification Instructions: Patient will be notified of biopsy results. However, patient instructed to call the office if not contacted within 2 weeks.
Billing Type: Third-Party Bill
Information: Selecting Yes will display possible errors in your note based on the variables you have selected. This validation is only offered as a suggestion for you. PLEASE NOTE THAT THE VALIDATION TEXT WILL BE REMOVED WHEN YOU FINALIZE YOUR NOTE. IF YOU WANT TO FAX A PRELIMINARY NOTE YOU WILL NEED TO TOGGLE THIS TO 'NO' IF YOU DO NOT WANT IT IN YOUR FAXED NOTE.

## 2023-05-31 NOTE — PROCEDURE: PRESCRIPTION MEDICATION MANAGEMENT
Detail Level: Zone
Continue Regimen: Halobetasol 0.05% ointment- apply a thin layer QDAY to AA of back
Render In Strict Bullet Format?: No
Initiate Treatment: Hydrocortisone 2.5% cream - apply a thin layer BID to rash of face until clear then use PRN flare

## 2023-06-14 ENCOUNTER — RX ONLY (OUTPATIENT)
Age: 89
Setting detail: RX ONLY
End: 2023-06-14

## 2023-06-14 RX ORDER — MUPIROCIN 20 MG/G
1 OINTMENT TOPICAL BID
Qty: 22 | Refills: 0 | Status: ERX | COMMUNITY
Start: 2023-06-14

## 2023-06-27 ENCOUNTER — HOSPITAL ENCOUNTER (INPATIENT)
Facility: HOSPITAL | Age: 88
LOS: 1 days | Discharge: HOME | DRG: 149 | End: 2023-06-30
Attending: EMERGENCY MEDICINE | Admitting: HOSPITALIST
Payer: MEDICARE

## 2023-06-27 ENCOUNTER — APPOINTMENT (EMERGENCY)
Dept: RADIOLOGY | Facility: HOSPITAL | Age: 88
DRG: 149 | End: 2023-06-27
Payer: MEDICARE

## 2023-06-27 DIAGNOSIS — I67.81 ACUTE CEREBROVASCULAR INSUFFICIENCY: ICD-10-CM

## 2023-06-27 DIAGNOSIS — R42 VERTIGO: Primary | ICD-10-CM

## 2023-06-27 DIAGNOSIS — R20.2 PARESTHESIAS: ICD-10-CM

## 2023-06-27 LAB
ANION GAP SERPL CALC-SCNC: 5 MEQ/L (ref 3–15)
BASOPHILS # BLD: 0.02 K/UL (ref 0.01–0.1)
BASOPHILS NFR BLD: 0.3 %
BILIRUB UR QL STRIP.AUTO: NEGATIVE MG/DL
BUN SERPL-MCNC: 25 MG/DL (ref 7–25)
CALCIUM SERPL-MCNC: 9.3 MG/DL (ref 8.6–10.3)
CHLORIDE SERPL-SCNC: 103 MEQ/L (ref 98–107)
CLARITY UR REFRACT.AUTO: CLEAR
CO2 SERPL-SCNC: 30 MEQ/L (ref 21–31)
COLOR UR AUTO: COLORLESS
CREAT SERPL-MCNC: 0.8 MG/DL (ref 0.6–1.2)
DIFFERENTIAL METHOD BLD: ABNORMAL
EOSINOPHIL # BLD: 0 K/UL (ref 0.04–0.36)
EOSINOPHIL NFR BLD: 0 %
ERYTHROCYTE [DISTWIDTH] IN BLOOD BY AUTOMATED COUNT: 13 % (ref 11.7–14.4)
GFR SERPL CREATININE-BSD FRML MDRD: >60 ML/MIN/1.73M*2
GLUCOSE SERPL-MCNC: 91 MG/DL (ref 70–99)
GLUCOSE UR STRIP.AUTO-MCNC: NEGATIVE MG/DL
HCT VFR BLDCO AUTO: 36.1 % (ref 35–45)
HGB BLD-MCNC: 11.5 G/DL (ref 11.8–15.7)
HGB UR QL STRIP.AUTO: NEGATIVE
IMM GRANULOCYTES # BLD AUTO: 0.01 K/UL (ref 0–0.08)
IMM GRANULOCYTES NFR BLD AUTO: 0.2 %
KETONES UR STRIP.AUTO-MCNC: NEGATIVE MG/DL
LEUKOCYTE ESTERASE UR QL STRIP.AUTO: NEGATIVE
LYMPHOCYTES # BLD: 1.61 K/UL (ref 1.2–3.5)
LYMPHOCYTES NFR BLD: 25.3 %
MCH RBC QN AUTO: 30.7 PG (ref 28–33.2)
MCHC RBC AUTO-ENTMCNC: 31.9 G/DL (ref 32.2–35.5)
MCV RBC AUTO: 96.3 FL (ref 83–98)
MONOCYTES # BLD: 0.51 K/UL (ref 0.28–0.8)
MONOCYTES NFR BLD: 8 %
NEUTROPHILS # BLD: 4.22 K/UL (ref 1.7–7)
NEUTS SEG NFR BLD: 66.2 %
NITRITE UR QL STRIP.AUTO: NEGATIVE
NRBC BLD-RTO: 0 %
PDW BLD AUTO: 10.6 FL (ref 9.4–12.3)
PH UR STRIP.AUTO: 7.5 [PH]
PLATELET # BLD AUTO: 199 K/UL (ref 150–369)
POTASSIUM SERPL-SCNC: 4.2 MEQ/L (ref 3.5–5.1)
PROT UR QL STRIP.AUTO: NEGATIVE
RBC # BLD AUTO: 3.75 M/UL (ref 3.93–5.22)
SODIUM SERPL-SCNC: 138 MEQ/L (ref 136–145)
SP GR UR REFRACT.AUTO: 1.01
TROPONIN I SERPL HS-MCNC: 5.4 PG/ML
TROPONIN I SERPL HS-MCNC: 6.1 PG/ML
UROBILINOGEN UR STRIP-ACNC: 0.2 EU/DL
WBC # BLD AUTO: 6.37 K/UL (ref 3.8–10.5)

## 2023-06-27 PROCEDURE — 70450 CT HEAD/BRAIN W/O DYE: CPT | Mod: MG

## 2023-06-27 PROCEDURE — 84484 ASSAY OF TROPONIN QUANT: CPT | Performed by: PHYSICIAN ASSISTANT

## 2023-06-27 PROCEDURE — G0378 HOSPITAL OBSERVATION PER HR: HCPCS

## 2023-06-27 PROCEDURE — 81003 URINALYSIS AUTO W/O SCOPE: CPT | Performed by: PHYSICIAN ASSISTANT

## 2023-06-27 PROCEDURE — 96360 HYDRATION IV INFUSION INIT: CPT

## 2023-06-27 PROCEDURE — 93005 ELECTROCARDIOGRAM TRACING: CPT | Performed by: PHYSICIAN ASSISTANT

## 2023-06-27 PROCEDURE — 71045 X-RAY EXAM CHEST 1 VIEW: CPT

## 2023-06-27 PROCEDURE — 63700000 HC SELF-ADMINISTRABLE DRUG: Performed by: PHYSICIAN ASSISTANT

## 2023-06-27 PROCEDURE — 99222 1ST HOSP IP/OBS MODERATE 55: CPT | Performed by: HOSPITALIST

## 2023-06-27 PROCEDURE — 25800000 HC PHARMACY IV SOLUTIONS: Performed by: PHYSICIAN ASSISTANT

## 2023-06-27 PROCEDURE — 99285 EMERGENCY DEPT VISIT HI MDM: CPT | Mod: 25

## 2023-06-27 PROCEDURE — 85025 COMPLETE CBC W/AUTO DIFF WBC: CPT | Performed by: PHYSICIAN ASSISTANT

## 2023-06-27 PROCEDURE — 84484 ASSAY OF TROPONIN QUANT: CPT | Mod: 91 | Performed by: PHYSICIAN ASSISTANT

## 2023-06-27 PROCEDURE — 36415 COLL VENOUS BLD VENIPUNCTURE: CPT | Performed by: PHYSICIAN ASSISTANT

## 2023-06-27 PROCEDURE — 1123F ACP DISCUSS/DSCN MKR DOCD: CPT | Performed by: HOSPITALIST

## 2023-06-27 PROCEDURE — 96361 HYDRATE IV INFUSION ADD-ON: CPT

## 2023-06-27 PROCEDURE — 80048 BASIC METABOLIC PNL TOTAL CA: CPT | Performed by: PHYSICIAN ASSISTANT

## 2023-06-27 RX ORDER — FOLIC ACID 0.4 MG
400 TABLET ORAL DAILY
Status: DISCONTINUED | OUTPATIENT
Start: 2023-06-28 | End: 2023-06-30 | Stop reason: HOSPADM

## 2023-06-27 RX ORDER — THIAMINE HCL 100 MG
50 TABLET ORAL DAILY
Status: DISCONTINUED | OUTPATIENT
Start: 2023-06-28 | End: 2023-06-30 | Stop reason: HOSPADM

## 2023-06-27 RX ORDER — CARBOXYMETHYLCELLULOSE SODIUM 5 MG/ML
1 SOLUTION/ DROPS OPHTHALMIC 3 TIMES DAILY PRN
Status: DISCONTINUED | OUTPATIENT
Start: 2023-06-27 | End: 2023-06-30 | Stop reason: HOSPADM

## 2023-06-27 RX ORDER — HYDROCORTISONE 25 MG/G
CREAM TOPICAL
COMMUNITY
Start: 2023-05-31

## 2023-06-27 RX ORDER — NAPROXEN SODIUM 220 MG/1
324 TABLET, FILM COATED ORAL ONCE
Status: COMPLETED | OUTPATIENT
Start: 2023-06-27 | End: 2023-06-27

## 2023-06-27 RX ORDER — LORAZEPAM 0.5 MG/1
0.5 TABLET ORAL NIGHTLY PRN
Status: DISCONTINUED | OUTPATIENT
Start: 2023-06-27 | End: 2023-06-28

## 2023-06-27 RX ORDER — ACETAMINOPHEN 325 MG/1
650 TABLET ORAL EVERY 4 HOURS PRN
Status: DISCONTINUED | OUTPATIENT
Start: 2023-06-27 | End: 2023-06-30 | Stop reason: HOSPADM

## 2023-06-27 RX ORDER — IBUPROFEN 200 MG
16-32 TABLET ORAL AS NEEDED
Status: DISCONTINUED | OUTPATIENT
Start: 2023-06-27 | End: 2023-06-30 | Stop reason: HOSPADM

## 2023-06-27 RX ORDER — PANTOPRAZOLE SODIUM 40 MG/1
40 TABLET, DELAYED RELEASE ORAL DAILY
Status: DISCONTINUED | OUTPATIENT
Start: 2023-06-28 | End: 2023-06-30 | Stop reason: HOSPADM

## 2023-06-27 RX ORDER — ACETAMINOPHEN 650 MG/20.3ML
650 LIQUID ORAL EVERY 4 HOURS PRN
Status: DISCONTINUED | OUTPATIENT
Start: 2023-06-27 | End: 2023-06-30 | Stop reason: HOSPADM

## 2023-06-27 RX ORDER — CARBOXYMETHYLCELLULOSE SODIUM 5 MG/ML
1 SOLUTION/ DROPS OPHTHALMIC 3 TIMES DAILY PRN
COMMUNITY

## 2023-06-27 RX ORDER — HYDROCORTISONE 25 MG/G
1 CREAM TOPICAL 2 TIMES DAILY PRN
Status: DISCONTINUED | OUTPATIENT
Start: 2023-06-27 | End: 2023-06-30 | Stop reason: HOSPADM

## 2023-06-27 RX ORDER — DEXTROSE 50 % IN WATER (D50W) INTRAVENOUS SYRINGE
25 AS NEEDED
Status: DISCONTINUED | OUTPATIENT
Start: 2023-06-27 | End: 2023-06-30 | Stop reason: HOSPADM

## 2023-06-27 RX ORDER — ASPIRIN 81 MG/1
81 TABLET ORAL DAILY
Status: DISCONTINUED | OUTPATIENT
Start: 2023-06-27 | End: 2023-06-28

## 2023-06-27 RX ORDER — DEXTROSE 40 %
15-30 GEL (GRAM) ORAL AS NEEDED
Status: DISCONTINUED | OUTPATIENT
Start: 2023-06-27 | End: 2023-06-30 | Stop reason: HOSPADM

## 2023-06-27 RX ORDER — SIMETHICONE 80 MG
160 TABLET,CHEWABLE ORAL 4 TIMES DAILY PRN
Status: DISCONTINUED | OUTPATIENT
Start: 2023-06-27 | End: 2023-06-30 | Stop reason: HOSPADM

## 2023-06-27 RX ORDER — CHOLECALCIFEROL (VITAMIN D3) 25 MCG
1000 TABLET ORAL DAILY
Status: DISCONTINUED | OUTPATIENT
Start: 2023-06-28 | End: 2023-06-30 | Stop reason: HOSPADM

## 2023-06-27 RX ORDER — ACETAMINOPHEN 650 MG/1
650 SUPPOSITORY RECTAL EVERY 4 HOURS PRN
Status: DISCONTINUED | OUTPATIENT
Start: 2023-06-27 | End: 2023-06-30 | Stop reason: HOSPADM

## 2023-06-27 RX ADMIN — ASPIRIN 81 MG 324 MG: 81 TABLET ORAL at 20:18

## 2023-06-27 RX ADMIN — SODIUM CHLORIDE 500 ML: 9 INJECTION, SOLUTION INTRAVENOUS at 16:31

## 2023-06-27 ASSESSMENT — COGNITIVE AND FUNCTIONAL STATUS - GENERAL
STANDING UP FROM CHAIR USING ARMS: 3 - A LITTLE
CLIMB 3 TO 5 STEPS WITH RAILING: 2 - A LOT
MOVING TO AND FROM BED TO CHAIR: 3 - A LITTLE
WALKING IN HOSPITAL ROOM: 2 - A LOT

## 2023-06-27 ASSESSMENT — ENCOUNTER SYMPTOMS
VOMITING: 0
LIGHT-HEADEDNESS: 0
FEVER: 0
NAUSEA: 0
ABDOMINAL PAIN: 0
WEAKNESS: 1
NUMBNESS: 0
DIFFICULTY URINATING: 0
DIARRHEA: 0
HEADACHES: 1
BACK PAIN: 0
SHORTNESS OF BREATH: 0
DIZZINESS: 1

## 2023-06-27 NOTE — ED ATTESTATION NOTE
I have personally seen and examined the patient. I personally performed the key components of the encounter and provided a substantive portion of the care and medical decision making. I reviewed and agree with the physician assistant’s assessment and plan of care, except as where stated below.    My examination, assessment, and plan of care of Kathy Clayton is as follows:     Patient is an 89-year-old female who presents the emergency department for evaluation of dizziness.  Patient states that she woke up this morning with a headache, felt dizzy.  Symptoms persisted throughout the day.  She feels off balance.  Was seen at her PCPs office, referred to the emergency department due to concern for a CVA.  She has no numbness, tingling.  At one point she felt as though she may have had double vision.  At the time of arrival in the emergency department, symptoms have improved from when they were at their worst but she is not fully back to normal.    On exam, patient is awake, alert, in no distress.  Full extraocular movements.  No facial droop.  Normal speech.  Heart rate is normal.  Respirations are nonlabored.  No focal neurologic deficits.    EKG done on arrival showing normal sinus rhythm, right bundle branch block.  Similar to prior EKGs.  NIH stroke score is 0, not a candidate for tPA nor for neuro intervention.  We will check lab work, CT head     Lenore Narayan MD  06/27/23 8784

## 2023-06-27 NOTE — H&P
Hospital Medicine Service -  History & Physical        CHIEF COMPLAINT   Dizziness     HISTORY OF PRESENT ILLNESS      Kathy Clayton is a 89 y.o. female with a past medical history of coronary artery disease not on therapy due to intolerance, history of SVT status post ablation, coronary artery disease not on aspirin or statin due to intolerance, hyperlipidemia who presents with dizziness starting in the morning associated with headache.  She states that she is felt off balance and a little nauseated.  Her p.o. intake has been decreased all day.  She also states that her dizziness seems to get worse when she turns her head side to side.  She is an occasional beeping sound out of her right ear but she states that this is not abnormal for her.  She denies any trauma.  She went to her PCPs office today who sent her to the ER for rule out of stroke.  She denies any fevers chills chest pain shortness of breath cough or headaches.  She has had no diarrhea.    In ER they did CT head which was negative for stroke.  Aspirin was given and admitted for stroke work-up.    Discussed CODE STATUS with patient she states that at this time she wishes to be a full code but would not want to be sustained on a ventilator.  Attempted to call patient's daughter Candace to give her an update but there is no pickup.  Left message.    PAST MEDICAL AND SURGICAL HISTORY      Past Medical History:   Diagnosis Date   • Abnormal ECG    • Coronary artery disease    • COVID-19 December / January   • Dilated aortic root (CMS/HCC)    • Hypertension    • Lipid disorder    • Mitral regurgitation    • Pulmonary nodules 2012       Past Surgical History:   Procedure Laterality Date   • CARDIAC ELECTROPHYSIOLOGY MAPPING AND ABLATION     • CATARACT EXTRACTION, BILATERAL     • HYSTERECTOMY         MEDICATIONS      Prior to Admission medications    Medication Sig Start Date End Date Taking? Authorizing Provider   acetaminophen (TYLENOL EXTRA STRENGTH  ORAL) Take 500 mg by mouth daily.   Yes Benoit Ribera MD   carboxymethylcellulose (REFRESH PLUS) 0.5 % dropperette Administer 1 drop into both eyes 3 (three) times a day as needed for dry eyes.   Yes ProviderKenia MD   cholecalciferol, vitamin D3, 1,000 unit (25 mcg) tablet Take 1 tablet by mouth daily. 6/6/11  Yes Benoit Ribera MD   CYANOCOBALAMIN/FOLIC ACID (VITAMIN B12-FOLIC ACID) 500-400 mcg tablet Take 1 tablet by mouth daily. 6/6/11  Yes Benoit Ribera MD   flaxseed oil 1,000 mg capsule Take 1 capsule by mouth as needed.   9/26/16  Yes Benoit Ribera MD   folic acid (FOLVITE) 400 mcg tablet Take 1 tablet by mouth daily. 2/6/12  Yes Benoit Ribera MD   hydrocortisone 2.5 % cream APPLY A THIN LAYER TWICE A DAY TO RASH ON FACE UNTIL CLEAR THEN USE AS NEEDED FOR FLARE 5/31/23  Yes Provider osmin Laboy MD   Lactobac no.41-Bifidobact no.7 70 mg (3 billion cell) capsule Take 1 capsule by mouth daily.   Yes Benoit Ribera MD   lansoprazole (PREVACID SOLUTAB) 30 mg disintegrating tablet Take 30 mg by mouth daily. 6/6/11  Yes Benoit Ribera MD   LORazepam (ATIVAN) 0.5 mg tablet Take 0.5 mg by mouth nightly as needed for anxiety. 6/6/11  Yes Benoit Ribera MD   magnesium hydroxide (MILK OF MAGNESIA CONCENTRATED ORAL) Take by mouth once.   Yes Benoit Ribrea MD   multivit-min-FA-lycopen-lutein tablet Take 1 tablet by mouth daily. 6/6/11  Yes Benoit Ribera MD   potassium gluconate 595 mg (99 mg) tablet Take 1 tablet by mouth daily. 6/6/11  Yes Benoit Ribera MD   simethicone (MYLICON,GAS-X) 125 mg capsule Take 125 mg by mouth daily. 6/6/11  Yes Benoit Ribera MD   thiamine 50 mg tablet Take 50 mg by mouth daily. 9/26/16  Yes Benoit Ribera MD   clobetasoL (TEMOVATE) 0.05 % ointment Apply 1 application topically 2 (two) times a week (Mon, Thu). 3/4/21   Benoit Ribera MD   diclofenac sodium 1.5 % drops Apply  topically.    Benoit Ribera MD   polyvinyl alcohol-povidon,PF, 1.4-0.6 % dropperette Take 1 drop by mouth 4 (four) times a day as needed. 6/6/11 6/27/23  Benoit Ribera MD   RESTASIS 0.05 % ophthalmic emulsion INSTILL ONE DROP INTO BOTH EYES TWICE A DAY 2/10/23 6/27/23  Kenia Ribera MD   simethicone (MYLANTA GAS ORAL) Take by mouth.  6/27/23  Benoit Ribera MD       ALLERGIES      Adenosine, Anesthesia s/i-40  [propofol], Cephalosporins, Desvenlafaxine succinate, Dexbrompheniramine, Diltiazem, Escitalopram oxalate, Ezetimibe, Fluoxetine hcl, Lactase, Metoprolol, Mirtazapine, Penicillins, Plant stanol leonila, Sertraline hcl, Statins-hmg-coa reductase inhibitors, and Sulfa (sulfonamide antibiotics)    FAMILY HISTORY      Family History   Problem Relation Age of Onset   • Hyperlipidemia Biological Mother    • Diabetes Biological Father    • Hyperlipidemia Biological Father    • [place]'s disease Biological Father    • [place]'s disease Biological Sister    • Multiple sclerosis Biological Brother    • Heart disease Father's Brother    • Stroke Father's Brother    • Stroke Mother's Sister    • Cystic fibrosis Niece        SOCIAL HISTORY      Social History     Socioeconomic History   • Marital status:      Spouse name: None   • Number of children: None   • Years of education: None   • Highest education level: None   Tobacco Use   • Smoking status: Never     Passive exposure: Never   • Smokeless tobacco: Never   Substance and Sexual Activity   • Alcohol use: No   • Drug use: Defer   • Sexual activity: Defer   Social History Narrative    Retired -  in [employer]    lives with daughter     Social Determinants of Health     Food Insecurity: No Food Insecurity (6/27/2023)    Hunger Vital Sign    • Worried About Running Out of Food in the Last Year: Never true    • Ran Out of Food in the Last Year: Never true       REVIEW OF SYSTEMS      Review of Systems    Constitutional: Negative for chills and fatigue.   HENT: Negative for congestion and sore throat.    Eyes: Negative for photophobia and visual disturbance.   Respiratory: Negative for chest tightness and shortness of breath.    Cardiovascular: Negative for chest pain, palpitations and leg swelling.   Gastrointestinal: Positive for nausea. Negative for abdominal distention, diarrhea and vomiting.   Genitourinary: Positive for frequency. Negative for dysuria.   Musculoskeletal: Positive for neck pain. Negative for back pain.   Skin: Negative for rash and wound.   Neurological: Positive for dizziness and headaches. Negative for syncope and light-headedness.   Psychiatric/Behavioral: Negative for agitation and behavioral problems.       PHYSICAL EXAMINATION      Temp:  [36.8 °C (98.2 °F)] 36.8 °C (98.2 °F)  Heart Rate:  [64-68] 64  Resp:  [15-20] 18  BP: (160-173)/(80-88) 173/80  Body mass index is 28.34 kg/m².    Visit Vitals  BP (!) 173/80   Pulse 64   Temp 36.8 °C (98.2 °F) (Oral)   Resp 18   Wt 72.6 kg (160 lb)   LMP  (LMP Unknown)   SpO2 96%   BMI 28.34 kg/m²       General Appearance:    Alert, cooperative, no distress, appears stated age   Head:    Normocephalic, without obvious abnormality, atraumatic   Eyes:    PERRL, conjunctiva/corneas clear, EOM's intact,, when patient turns eyes to the right side there is right vertical nystagmus more prominent           Throat:   Lips, mucosa, and tongue normal; teeth and gums normal   Neck:   Supple, symmetrical, trachea midline, no adenopathy;        thyroid:  No enlargement/tenderness/nodules; no carotid    bruit or JVD   Back:     Symmetric, no curvature, ROM normal, no CVA tenderness   Lungs:     Clear to auscultation bilaterally, respirations unlabored   Chest wall:    No tenderness or deformity   Heart:    Regular rate and rhythm, S1 and S2 normal   Abdomen:     Soft, non-tender, bowel sounds active all four quadrants,     no masses, no organomegaly            Extremities:   Extremities normal, atraumatic, no cyanosis or edema   Pulses:   2+ and symmetric all extremities   Skin:   Skin color, texture, turgor normal, no rashes or lesions       Neurologic:   CNII-XII intact. Normal strength, alert and oriented to person place time and situation, no drift     LABS / IMAGING / EKG        Labs  CBC Results       06/27/23     1624    WBC 6.37    RBC 3.75    HGB 11.5    HCT 36.1    MCV 96.3    MCH 30.7    MCHC 31.9            CMP Results       06/27/23     1624        K 4.2    Cl 103    CO2 30    Glucose 91    BUN 25    Creatinine 0.8    Calcium 9.3    Anion Gap 5    EGFR >60.0         Comment for K at 1624 on 06/27/23: Results obtained on plasma. Plasma Potassium values may be up to 0.4 mEQ/L less than serum values. The differences may be greater for patients with high platelet or white cell counts.        Troponin I Results       06/27/23 06/27/23     1930 1624    HS Troponin I 6.1 5.4        Microbiology Results     ** No results found for the last 720 hours. **        UA Results       06/27/23     1624    Color Colorless    Clarity Clear    Glucose Negative    Bilirubin Negative    Ketones Negative    Sp Grav 1.012    Blood Negative    Ph 7.5    Protein Negative    Urobilinogen 0.2    Nitrite Negative    Leuk Est Negative         Comment for Blood at 1624 on 06/27/23: The sensitivity of the occult blood test is equivalent to approximately 4 intact RBC/HPF.    Comment for Leuk Est at 1624 on 06/27/23: Results can be falsely negative due to high specific gravity, some antibiotics, glucose >3 g/dl, or WBC other than neutrophils.            Imaging  CT HEAD WITHOUT IV CONTRAST    Result Date: 6/27/2023  IMPRESSION: No acute intracranial abnormality. COMMENT: Noncontrast CT of the head was performed.  One or more dose reduction techniques (e.g., Automated exposure control, adjustment of the mA and/or kV according to the patient size, use of iterative reconstruction  technique) utilized for this examination. There is prominence of the ventricles and sulci compatible with age appropriate volume loss.  Mild patchy periventricular and subcortical white matter hypoattenuation identified, nonspecific but likely sequela of mild chronic microangiopathy.  There is no evidence of acute intracranial hemorrhage, large acute territorial infarct, extra-axial collection, or mass-effect.  Ventricles are midline without evidence of hydrocephalus. Partially empty sella noted. Visualized paranasal sinuses and mastoid air cells are clear. Intracranial atherosclerotic calcifications noted. No calvarial fracture or sizable scalp hematoma.    X-RAY CHEST 1 VIEW    Result Date: 6/27/2023  IMPRESSION: No active disease in the chest. COMMENT: Tubes/Lines: None. Lungs/Pleura: Mild bibasilar scarring and/or subsegmental atelectasis without consolidation, sizable pleural effusion, or pneumothorax in this semierect AP portable chest x-ray. Cardiomediastinal silhouette: Cardiomegaly and slightly tortuous calcified thoracic aorta as well as mitral annular calcification noted. Regional Soft Tissue/Osseous Structures: Mild scoliotic curvature.         ECG/Telemetry  I have independently reviewed the ECG. Significant findings include Sinus rhythm rate of 70 QTc of 501 right bundle branch block.    ASSESSMENT AND PLAN           Gastroesophageal reflux disease without esophagitis  Assessment & Plan  C/w PPI     SVT (supraventricular tachycardia) (CMS/HCC)  Assessment & Plan  S/p ablation 2006  In NSR     Coronary artery disease involving native coronary artery of native heart without angina pectoris  Assessment & Plan  Coronary CT angiogram done [facility] June 2021 mild nonobstructive disease aortic root was 4.1 on that study   As outpt not on therapy cannot tolerate aspirin or statins no symptoms of angina  Started on ASA for stroke prevention  [place] [facility] [place] [facility] [place]      Essential hypertension  Assessment & Plan  Currently not on any meds and BP stable     Mixed hyperlipidemia  Assessment & Plan  Per pt did not tolerate meds as outpt  Will monitor off medication     * Vertigo  Assessment & Plan  Suspect BPV but need to r/o CVA  Pt ademently declining MRI brain stating she is willing to get it as outpt in open MRI- explained reason why pt needs MRI and she knows and undeerstands the importance   Will order carotid US  Neuro checks  Neuro consult  ASA   PT/OT  Obs tele   IF workup neg will need outpt ENT w/up vs vestibular rehab as outpt         VTE Assessment: Padua    Code Status: Full Code  Estimated discharge date: 6/29/2023     This patient note has been dictated using speech recognition software. Inadvertent speech recognition errors should be disregarded.  Jeana Sharif, DO  6/27/2023

## 2023-06-27 NOTE — ED PROVIDER NOTES
Emergency Medicine Note  HPI   HISTORY OF PRESENT ILLNESS     89yo F w/ PMH CAD, HTN, HLD, pulmonary nodules who presents to ED via EMS from her doctor's office for vertigo and being unsteady on her feet. Pt reports she woke up at 5am today with a bad headache and feeling dizzy. States all day, headache got worse and dizziness felt like she couldn't walk steadily and was off balance. Went to her pcp's office who was worried about her having a stroke and sent her via ambulance for evaluation. Pt states she is not back to baseline but symptoms have definitely improved signifiacntly.             Patient History   PAST HISTORY     Reviewed from Nursing Triage:  Tobacco  Meds  Med Hx  Surg Hx  Fam Hx  Soc Hx      Past Medical History:   Diagnosis Date   • Abnormal ECG    • Coronary artery disease    • COVID-19 December / January   • Dilated aortic root (CMS/HCC)    • Hypertension    • Lipid disorder    • Mitral regurgitation    • Pulmonary nodules 2012       Past Surgical History:   Procedure Laterality Date   • CARDIAC ELECTROPHYSIOLOGY MAPPING AND ABLATION     • CATARACT EXTRACTION, BILATERAL     • HYSTERECTOMY         Family History   Problem Relation Age of Onset   • Hyperlipidemia Biological Mother    • Diabetes Biological Father    • Hyperlipidemia Biological Father    • [place]'s disease Biological Father    • [place]'s disease Biological Sister    • Multiple sclerosis Biological Brother    • Heart disease Father's Brother    • Stroke Father's Brother    • Stroke Mother's Sister    • Cystic fibrosis Niece        Social History     Tobacco Use   • Smoking status: Never     Passive exposure: Never   • Smokeless tobacco: Never   Substance Use Topics   • Alcohol use: No   • Drug use: Defer         Review of Systems   REVIEW OF SYSTEMS     Review of Systems   Constitutional: Negative for fever.   Respiratory: Negative for shortness of breath.    Cardiovascular: Negative for chest pain.   Gastrointestinal:  "Negative for abdominal pain, diarrhea, nausea and vomiting.   Genitourinary: Negative for difficulty urinating.   Musculoskeletal: Negative for back pain.   Neurological: Positive for dizziness, weakness (\"in my legs\") and headaches. Negative for light-headedness and numbness.         VITALS     ED Vitals    Date/Time Temp Pulse Resp BP SpO2 [facility]   06/27/23 1730 -- 64 18 173/80 -- JS   06/27/23 1630 -- 68 20 160/88 96 % SS   06/27/23 1619 36.8 °C (98.2 °F) 64 15 -- 97 % SS        Pulse Ox %: 97 % (06/27/23 1804)  Pulse Ox Interpretation: Normal (06/27/23 1804)           Physical Exam   PHYSICAL EXAM     Physical Exam  Vitals and nursing note reviewed.   Constitutional:       General: She is not in acute distress.     Appearance: Normal appearance. She is normal weight. She is not ill-appearing or toxic-appearing.   HENT:      Head: Normocephalic and atraumatic.   Eyes:      Extraocular Movements: Extraocular movements intact.      Conjunctiva/sclera: Conjunctivae normal.   Cardiovascular:      Rate and Rhythm: Normal rate and regular rhythm.      Pulses: Normal pulses.      Heart sounds: Normal heart sounds.   Pulmonary:      Effort: Pulmonary effort is normal. No respiratory distress.      Breath sounds: Normal breath sounds.   Abdominal:      General: Abdomen is flat.      Palpations: Abdomen is soft.      Tenderness: There is no abdominal tenderness.   Musculoskeletal:         General: No swelling. Normal range of motion.      Cervical back: Normal range of motion and neck supple.   Skin:     General: Skin is warm and dry.      Capillary Refill: Capillary refill takes less than 2 seconds.   Neurological:      General: No focal deficit present.      Mental Status: She is alert and oriented to person, place, and time. Mental status is at baseline.      Cranial Nerves: No cranial nerve deficit.      Motor: No weakness.   Psychiatric:         Mood and Affect: Mood normal.         Behavior: Behavior normal.         " Thought Content: Thought content normal.         Judgment: Judgment normal.           PROCEDURES     Procedures     DATA     Results     Procedure Component Value Units Date/Time    HS Troponin (with 2 hour reflex) [[id number]]  (Normal) Collected: 06/27/23 1624    Specimen: Blood, Venous Updated: 06/27/23 1703     High Sens Troponin I 5.4 pg/mL     Basic metabolic panel [[id number]]  (Normal) Collected: 06/27/23 1624    Specimen: Blood, Venous Updated: 06/27/23 1658     Sodium 138 mEQ/L      Potassium 4.2 mEQ/L      Comment: Results obtained on plasma. Plasma Potassium values may be up to 0.4 mEQ/L less than serum values. The differences may be greater for patients with high platelet or white cell counts.        Chloride 103 mEQ/L      CO2 30 mEQ/L      BUN 25 mg/dL      Creatinine 0.8 mg/dL      Glucose 91 mg/dL      Calcium 9.3 mg/dL      eGFR >60.0 mL/min/1.73m*2      Anion Gap 5 mEQ/L     UA with reflex culture [[id number]]  (Normal) Collected: 06/27/23 1624    Specimen: Urine, Clean Catch Updated: 06/27/23 1637    Narrative:      The following orders were created for panel order UA with reflex culture.  Procedure                               Abnormality         Status                     ---------                               -----------         ------                     UA Reflex to Culture (Ma...[[id number]]  Normal              Final result                 Please view results for these tests on the individual orders.    UA Reflex to Culture (Macroscopic) [[id number]]  (Normal) Collected: 06/27/23 1624    Specimen: Urine, Clean Catch Updated: 06/27/23 1637     Color, Urine Colorless     Clarity, Urine Clear     Specific Gravity, Urine 1.012     pH, Urine 7.5     Leukocyte Esterase Negative     Comment: Results can be falsely negative due to high specific gravity, some antibiotics, glucose >3 g/dl, or WBC other than neutrophils.        Nitrite, Urine Negative     Protein, Urine Negative     Glucose, Urine  Negative mg/dL      Ketones, Urine Negative mg/dL      Urobilinogen, Urine 0.2 EU/dL      Bilirubin, Urine Negative mg/dL      Blood, Urine Negative     Comment: The sensitivity of the occult blood test is equivalent to approximately 4 intact RBC/HPF.       CBC and differential [[id number]]  (Abnormal) Collected: 06/27/23 1624    Specimen: Blood, Venous Updated: 06/27/23 1636     WBC 6.37 K/uL      RBC 3.75 M/uL      Hemoglobin 11.5 g/dL      Hematocrit 36.1 %      MCV 96.3 fL      MCH 30.7 pg      MCHC 31.9 g/dL      RDW 13.0 %      Platelets 199 K/uL      MPV 10.6 fL      Differential Type Auto     nRBC 0.0 %      Immature Granulocytes 0.2 %      Neutrophils 66.2 %      Lymphocytes 25.3 %      Monocytes 8.0 %      Eosinophils 0.0 %      Basophils 0.3 %      Immature Granulocytes, Absolute 0.01 K/uL      Neutrophils, Absolute 4.22 K/uL      Lymphocytes, Absolute 1.61 K/uL      Monocytes, Absolute 0.51 K/uL      Eosinophils, Absolute 0.00 K/uL      Basophils, Absolute 0.02 K/uL           Imaging Results          CT HEAD WITHOUT IV CONTRAST (Final result)  Result time 06/27/23 18:30:57    Final result                 Impression:    IMPRESSION: No acute intracranial abnormality.    COMMENT: Noncontrast CT of the head was performed.  One or more dose reduction  techniques (e.g., Automated exposure control, adjustment of the mA and/or kV  according to the patient size, use of iterative reconstruction technique)  utilized for this examination.    There is prominence of the ventricles and sulci compatible with age appropriate  volume loss.  Mild patchy periventricular and subcortical white matter  hypoattenuation identified, nonspecific but likely sequela of mild chronic  microangiopathy.  There is no evidence of acute intracranial hemorrhage, large  acute territorial infarct, extra-axial collection, or mass-effect.  Ventricles  are midline without evidence of hydrocephalus. Partially empty sella noted.    Visualized  paranasal sinuses and mastoid air cells are clear.    Intracranial atherosclerotic calcifications noted.    No calvarial fracture or sizable scalp hematoma.             Narrative:    CLINICAL HISTORY: vertigo, headache, unsteady on feet    Comparison: No relevant prior studies                               X-RAY CHEST 1 VIEW (Final result)  Result time 06/27/23 16:40:47    Final result                 Impression:    IMPRESSION:  No active disease in the chest.    COMMENT:    Tubes/Lines: None.    Lungs/Pleura: Mild bibasilar scarring and/or subsegmental atelectasis without  consolidation, sizable pleural effusion, or pneumothorax in this semierect AP  portable chest x-ray.    Cardiomediastinal silhouette: Cardiomegaly and slightly tortuous calcified  thoracic aorta as well as mitral annular calcification noted.    Regional Soft Tissue/Osseous Structures: Mild scoliotic curvature.                 Narrative:    CLINICAL HISTORY: vertigo  Comparison: Chest x-ray dated 11/09/05.                                ECG 12 lead    (Results Pending)       Scoring tools                                  ED Course & MDM   MDM / ED COURSE / CLINICAL IMPRESSION / DISPO     MDM    ED Course as of 06/27/23 2226 Tue Jun 27, 2023   1652 Hemoglobin(!): 11.5 [ADAM]   1652 X-RAY CHEST 1 VIEW  IMPRESSION:  No active disease in the chest. [ADAM]   1841 CT HEAD WITHOUT IV CONTRAST  IMPRESSION: No acute intracranial abnormality. [ADAM]   1841 Reviewed plan with pt and daughter. Paged [facility] [place]. Ordered ASa [ADAM]   1921 Spoke to Dr. Sharif [facility] [place] [ADAM]      ED Course User Index  [ADAM] Tamika Piña PA C     Clinical Impression      Vertigo     _________________     ED Disposition   Admit / Observation                   Tamika Piña PA C  06/27/23 2226

## 2023-06-28 ENCOUNTER — APPOINTMENT (OUTPATIENT)
Dept: CARDIOLOGY | Facility: HOSPITAL | Age: 88
Setting detail: OBSERVATION
DRG: 149 | End: 2023-06-28
Attending: HOSPITALIST
Payer: MEDICARE

## 2023-06-28 ENCOUNTER — APPOINTMENT (OUTPATIENT)
Dept: RADIOLOGY | Facility: HOSPITAL | Age: 88
Setting detail: OBSERVATION
DRG: 149 | End: 2023-06-28
Attending: HOSPITALIST
Payer: MEDICARE

## 2023-06-28 LAB
ANION GAP SERPL CALC-SCNC: 7 MEQ/L (ref 3–15)
ATRIAL RATE: 70
BUN SERPL-MCNC: 19 MG/DL (ref 7–25)
CALCIUM SERPL-MCNC: 8.9 MG/DL (ref 8.6–10.3)
CHLORIDE SERPL-SCNC: 103 MEQ/L (ref 98–107)
CO2 SERPL-SCNC: 30 MEQ/L (ref 21–31)
CREAT SERPL-MCNC: 0.7 MG/DL (ref 0.6–1.2)
ERYTHROCYTE [DISTWIDTH] IN BLOOD BY AUTOMATED COUNT: 12.8 % (ref 11.7–14.4)
EST. AVERAGE GLUCOSE BLD GHB EST-MCNC: 111 MG/DL
GFR SERPL CREATININE-BSD FRML MDRD: >60 ML/MIN/1.73M*2
GLUCOSE SERPL-MCNC: 95 MG/DL (ref 70–99)
HBA1C MFR BLD: 5.5 %
HCT VFR BLDCO AUTO: 36.4 % (ref 35–45)
HGB BLD-MCNC: 11.9 G/DL (ref 11.8–15.7)
MCH RBC QN AUTO: 31.2 PG (ref 28–33.2)
MCHC RBC AUTO-ENTMCNC: 32.7 G/DL (ref 32.2–35.5)
MCV RBC AUTO: 95.3 FL (ref 83–98)
P AXIS: 93
PDW BLD AUTO: 10.2 FL (ref 9.4–12.3)
PLATELET # BLD AUTO: 189 K/UL (ref 150–369)
POTASSIUM SERPL-SCNC: 3.8 MEQ/L (ref 3.5–5.1)
PR INTERVAL: 186
QRS DURATION: 140
QT INTERVAL: 464
QTC CALCULATION(BAZETT): 501
R AXIS: 57
RBC # BLD AUTO: 3.82 M/UL (ref 3.93–5.22)
SODIUM SERPL-SCNC: 140 MEQ/L (ref 136–145)
T WAVE AXIS: 20
VENTRICULAR RATE: 70
WBC # BLD AUTO: 5.63 K/UL (ref 3.8–10.5)

## 2023-06-28 PROCEDURE — 80048 BASIC METABOLIC PNL TOTAL CA: CPT | Performed by: HOSPITALIST

## 2023-06-28 PROCEDURE — G0378 HOSPITAL OBSERVATION PER HR: HCPCS

## 2023-06-28 PROCEDURE — 85027 COMPLETE CBC AUTOMATED: CPT | Performed by: HOSPITALIST

## 2023-06-28 PROCEDURE — 70450 CT HEAD/BRAIN W/O DYE: CPT | Mod: MG

## 2023-06-28 PROCEDURE — 93880 EXTRACRANIAL BILAT STUDY: CPT

## 2023-06-28 PROCEDURE — 63700000 HC SELF-ADMINISTRABLE DRUG: Performed by: HOSPITALIST

## 2023-06-28 PROCEDURE — 200200 PR NO CHARGE: Performed by: HOSPITALIST

## 2023-06-28 PROCEDURE — 97162 PT EVAL MOD COMPLEX 30 MIN: CPT | Mod: GP,KX

## 2023-06-28 PROCEDURE — 97535 SELF CARE MNGMENT TRAINING: CPT | Mod: GO

## 2023-06-28 PROCEDURE — 36415 COLL VENOUS BLD VENIPUNCTURE: CPT | Performed by: HOSPITALIST

## 2023-06-28 PROCEDURE — 97166 OT EVAL MOD COMPLEX 45 MIN: CPT | Mod: GO

## 2023-06-28 PROCEDURE — 99232 SBSQ HOSP IP/OBS MODERATE 35: CPT | Performed by: HOSPITALIST

## 2023-06-28 PROCEDURE — 83036 HEMOGLOBIN GLYCOSYLATED A1C: CPT | Performed by: HOSPITALIST

## 2023-06-28 PROCEDURE — 80061 LIPID PANEL: CPT | Performed by: HOSPITALIST

## 2023-06-28 PROCEDURE — 97530 THERAPEUTIC ACTIVITIES: CPT | Mod: GP,KX

## 2023-06-28 RX ORDER — MECLIZINE HYDROCHLORIDE 25 MG/1
25 TABLET ORAL 3 TIMES DAILY PRN
Qty: 15 TABLET | Refills: 0 | Status: SHIPPED | OUTPATIENT
Start: 2023-06-28 | End: 2024-04-02

## 2023-06-28 RX ORDER — MECLIZINE HYDROCHLORIDE 25 MG/1
25 TABLET ORAL 3 TIMES DAILY
Status: DISCONTINUED | OUTPATIENT
Start: 2023-06-28 | End: 2023-06-30 | Stop reason: HOSPADM

## 2023-06-28 RX ORDER — LORAZEPAM 0.5 MG/1
0.5 TABLET ORAL ONCE
Status: COMPLETED | OUTPATIENT
Start: 2023-06-28 | End: 2023-06-28

## 2023-06-28 RX ORDER — NAPROXEN SODIUM 220 MG/1
81 TABLET, FILM COATED ORAL DAILY
Status: DISCONTINUED | OUTPATIENT
Start: 2023-06-28 | End: 2023-06-30 | Stop reason: HOSPADM

## 2023-06-28 RX ORDER — NAPROXEN SODIUM 220 MG/1
81 TABLET, FILM COATED ORAL DAILY
Qty: 30 TABLET | Refills: 0 | Status: SHIPPED | OUTPATIENT
Start: 2023-06-29 | End: 2024-04-02

## 2023-06-28 RX ADMIN — ACETAMINOPHEN 650 MG: 650 SOLUTION ORAL at 21:15

## 2023-06-28 RX ADMIN — ASPIRIN 81 MG 81 MG: 81 TABLET ORAL at 10:24

## 2023-06-28 RX ADMIN — LORAZEPAM 0.5 MG: 0.5 TABLET ORAL at 21:12

## 2023-06-28 RX ADMIN — MECLIZINE HYDROCHLORIDE 25 MG: 25 TABLET ORAL at 21:12

## 2023-06-28 ASSESSMENT — COGNITIVE AND FUNCTIONAL STATUS - GENERAL
STANDING UP FROM CHAIR USING ARMS: 3 - A LITTLE
AFFECT: WFL
AFFECT: WFL
DRESSING REGULAR LOWER BODY CLOTHING: 3 - A LITTLE
MOVING TO AND FROM BED TO CHAIR: 3 - A LITTLE
WALKING IN HOSPITAL ROOM: 3 - A LITTLE
STANDING UP FROM CHAIR USING ARMS: 3 - A LITTLE
EATING MEALS: 4 - NONE
HELP NEEDED FOR BATHING: 3 - A LITTLE
WALKING IN HOSPITAL ROOM: 3 - A LITTLE
HELP NEEDED FOR PERSONAL GROOMING: 3 - A LITTLE
TOILETING: 3 - A LITTLE
CLIMB 3 TO 5 STEPS WITH RAILING: 3 - A LITTLE
MOVING TO AND FROM BED TO CHAIR: 3 - A LITTLE
DRESSING REGULAR UPPER BODY CLOTHING: 3 - A LITTLE
CLIMB 3 TO 5 STEPS WITH RAILING: 3 - A LITTLE

## 2023-06-28 ASSESSMENT — ENCOUNTER SYMPTOMS
HEADACHES: 1
DIARRHEA: 0
NAUSEA: 1
SHORTNESS OF BREATH: 0
AGITATION: 0
CHEST TIGHTNESS: 0
DYSURIA: 0
FATIGUE: 0
SORE THROAT: 0
ABDOMINAL DISTENTION: 0
DIZZINESS: 1
FREQUENCY: 1
LIGHT-HEADEDNESS: 0
PHOTOPHOBIA: 0
BACK PAIN: 0
CHILLS: 0
WOUND: 0
VOMITING: 0
NECK PAIN: 1
PALPITATIONS: 0

## 2023-06-28 NOTE — DISCHARGE SUMMARY
Hospital Medicine Service -  Inpatient Discharge Summary        BRIEF OVERVIEW   Patient: Kathy Clayton (5/24/1933)    Admitting Provider: Jeana Sharif DO  Attending Provider: Varsha Burnette,* Attending phys phone: [phone]    PCP: Arpan Casillas [phone]    Admission Date: 6/27/2023  Discharge Date: 6/30/2023     DISCHARGE DIAGNOSES      Primary Discharge Diagnosis  Vertigo    Secondary Discharge Diagnoses  Active Hospital Problems    Diagnosis Date Noted   • Vertigo 06/27/2023   • Gastroesophageal reflux disease without esophagitis 05/16/2019   • SVT (supraventricular tachycardia) (CMS/[facility]) 04/01/2018   • Mixed hyperlipidemia 04/01/2018   • Essential hypertension 04/01/2018   • Coronary artery disease involving native coronary artery of native heart without angina pectoris 04/01/2018      Resolved Hospital Problems   No resolved problems to display.       Problem List on Day of Discharge  Gastroesophageal reflux disease without esophagitis  Assessment & Plan  C/w PPI     SVT (supraventricular tachycardia) (CMS/[facility])  Assessment & Plan  S/p ablation 2006  In NSR     Coronary artery disease involving native coronary artery of native heart without angina pectoris  Assessment & Plan  Coronary CT angiogram done [facility] June 2021 mild nonobstructive disease aortic root was 4.1 on that study   As outpt not on therapy cannot tolerate aspirin or statins no symptoms of angina  Started on ASA for stroke prevention  Pt follows with [facility]. [facility] at [facility]     Essential hypertension  Assessment & Plan  Currently not on any meds and BP stable     Mixed hyperlipidemia  Assessment & Plan  Per pt did not tolerate meds as outpt  Will monitor off medication     * Vertigo  Assessment & Plan  Suspect BPV but need to r/o CVA  Pt ademently declining MRI brain stating she is willing to get it as outpt in open MRI- explained reason why pt needs MRI and she knows and undeerstands  the importance   carotid US done pending read  ASA   PT/OT: cleared for home  Meclizine prn      SUMMARY OF HOSPITALIZATION      Presenting Problem/History of Present Illness  FROM HPI:  coronary artery disease not on therapy due to intolerance, history of SVT status post ablation, coronary artery disease not on aspirin or statin due to intolerance, hyperlipidemia who presents with dizziness starting in the morning associated with headache.  She states that she is felt off balance and a little nauseated.  Her p.o. intake has been decreased all day.  She also states that her dizziness seems to get worse when she turns her head side to side.  She is an occasional beeping sound out of her right ear but she states that this is not abnormal for her.  She denies any trauma.  She went to her PCPs office today who sent her to the ER for rule out of stroke.  She denies any fevers chills chest pain shortness of breath cough or headaches.  She has had no diarrhea.     In ER they did CT head which was negative for stroke.  Aspirin was given and admitted for stroke work-up.     Discussed CODE STATUS with patient she states that at this time she wishes to be a full code but would not want to be sustained on a ventilator.  Attempted to call patient's daughter Candace to give her an update but there is no pickup.  Left message.    Exam on Day of Discharge  Physical Exam  GENERAL APPEARANCE  Awake, alert    MUCUS MEMBRANES  Moist    LUNGS  CTAB, no w/r/r    CHEST  RRR, no m/g/r    ABDOMEN  Soft, NT, ND, +BS    EXTREMITIES  No c/c/e    SKIN  No obvious rash    HEENT  Anicteric    NEURO  Moving all extremities, Ox3, coherent, no dysarthria        Consults During Admission  IP CONSULT TO NEUROLOGY    DISCHARGE MEDICATIONS          Medication List      START taking these medications    aspirin 81 mg chewable tablet  Take 1 tablet (81 mg total) by mouth daily.  Dose: 81 mg  Notes to patient: Take tomorrow morning 6/30.      meclizine 25  mg tablet  Commonly known as: ANTIVERT  Take 1 tablet (25 mg total) by mouth 3 (three) times a day as needed for dizziness.  Dose: 25 mg        CONTINUE taking these medications    carboxymethylcellulose 0.5 % dropperette  Commonly known as: REFRESH PLUS  Administer 1 drop into both eyes 3 (three) times a day as needed for dry eyes.  Dose: 1 drop  Notes to patient: Take as needed for dry eyes.      cholecalciferol (vitamin D3) 1,000 unit (25 mcg) tablet  Take 1 tablet by mouth daily.  Dose: 1 tablet  Notes to patient: Take tomorrow morning 6/30.     clobetasoL 0.05 % ointment  Commonly known as: TEMOVATE  Apply 1 application topically 2 (two) times a week (Mon, Thu).  Dose: 1 application.  Notes to patient: Take tonight 6/29.     diclofenac sodium 1.5 % drops  Apply topically.  Notes to patient: Resume home regimen.      flaxseed oiL 1,000 mg capsule  Take 1 capsule by mouth as needed.  Dose: 1 capsule     folic acid 400 mcg tablet  Commonly known as: FOLVITE  Take 1 tablet by mouth daily.  Dose: 1 tablet  Notes to patient: Take tomorrow morning 6/30.     hydrocortisone 2.5 % cream  APPLY A THIN LAYER TWICE A DAY TO RASH ON FACE UNTIL CLEAR THEN USE AS NEEDED FOR FLARE     Lactobac no.41-Bifidobact no.7 70 mg (3 billion cell) capsule  Take 1 capsule by mouth daily.  Dose: 1 capsule  Notes to patient: Take tomorrow morning 6/30.     lansoprazole 30 mg disintegrating tablet  Commonly known as: PREVACID SOLUTAB  Take 30 mg by mouth daily.  Dose: 30 mg  Notes to patient: Take tomorrow morning 6/30.     LORazepam 0.5 mg tablet  Commonly known as: ATIVAN  Take 0.5 mg by mouth nightly as needed for anxiety.  Dose: 0.5 mg  Notes to patient: Take as needed for anxiety.      MILK OF MAGNESIA CONCENTRATED ORAL  Take by mouth once.  Notes to patient: Resume home regimen.      multivit-min-FA-lycopen-lutein tablet  Take 1 tablet by mouth daily.  Dose: 1 tablet  Notes to patient: Take tomorrow morning 6/29.     potassium  gluconate 595 mg (99 mg) tablet  Take 1 tablet by mouth daily.  Dose: 1 tablet  Notes to patient: Take tomorrow morning 6/29.     simethicone 125 mg capsule  Commonly known as: MYLICON,GAS-X  Take 125 mg by mouth daily.  Dose: 125 mg  Notes to patient: Take tomorrow morning 6/30.     thiamine 50 mg tablet  Take 50 mg by mouth daily.  Dose: 50 mg  Notes to patient: Take tomorrow morning 6/30.     TYLENOL EXTRA STRENGTH ORAL  Take 500 mg by mouth daily.  Dose: 500 mg  Notes to patient: Take tonight 6/29.     vitamin B12-folic acid 500-400 mcg tablet  Take 1 tablet by mouth daily.  Dose: 1 tablet  Notes to patient: Take tomorrow morning 6/30.              PROCEDURES / LABS / IMAGING      Operative Procedures  n/a    Pertinent Labs  Results from last 7 days   Lab Units 06/28/23  0324 06/27/23  1624   WBC K/uL 5.63 6.37   HEMOGLOBIN g/dL 11.9 11.5*   HEMATOCRIT % 36.4 36.1   PLATELETS K/uL 189 199    Results from last 7 days   Lab Units 06/28/23  0324 06/27/23  1624   SODIUM mEQ/L 140 138   POTASSIUM mEQ/L 3.8 4.2   CHLORIDE mEQ/L 103 103   CO2 mEQ/L 30 30   BUN mg/dL 19 25   CREATININE mg/dL 0.7 0.8   GLUCOSE mg/dL 95 91                                          Microbiology Results     ** No results found for the last 720 hours. **         Results from last 7 days   Lab Units 06/28/23  0324   CHOLESTEROL mg/dL 209*   TRIGLYCERIDES mg/dL 122   HDL mg/dL 61*   LDL CALC mg/dL 124*         No results found for: COVID19    Pertinent Imaging  CT HEAD WITHOUT IV CONTRAST    Result Date: 6/28/2023  IMPRESSION: No acute intracranial hemorrhage, large vascular territorial infarct, or mass effect.     CT HEAD WITHOUT IV CONTRAST    Result Date: 6/27/2023  IMPRESSION: No acute intracranial abnormality. COMMENT: Noncontrast CT of the head was performed.  One or more dose reduction techniques (e.g., Automated exposure control, adjustment of the mA and/or kV according to the patient size, use of iterative reconstruction technique)  utilized for this examination. There is prominence of the ventricles and sulci compatible with age appropriate volume loss.  Mild patchy periventricular and subcortical white matter hypoattenuation identified, nonspecific but likely sequela of mild chronic microangiopathy.  There is no evidence of acute intracranial hemorrhage, large acute territorial infarct, extra-axial collection, or mass-effect.  Ventricles are midline without evidence of hydrocephalus. Partially empty sella noted. Visualized paranasal sinuses and mastoid air cells are clear. Intracranial atherosclerotic calcifications noted. No calvarial fracture or sizable scalp hematoma.    X-RAY CHEST 1 VIEW    Result Date: 6/27/2023  IMPRESSION: No active disease in the chest. COMMENT: Tubes/Lines: None. Lungs/Pleura: Mild bibasilar scarring and/or subsegmental atelectasis without consolidation, sizable pleural effusion, or pneumothorax in this semierect AP portable chest x-ray. Cardiomediastinal silhouette: Cardiomegaly and slightly tortuous calcified thoracic aorta as well as mitral annular calcification noted. Regional Soft Tissue/Osseous Structures: Mild scoliotic curvature.       OUTPATIENT  FOLLOW-UP / REFERRALS / PENDING TESTS        Outpatient Follow-Up Appointments            [facility] in [place]    In 9 months Matias Willard MD [facility] in [place]          Referrals  Orders Placed This Encounter   Procedures   • Ambulatory referral to Physical Therapy     Standing Status:   Future     Standing Expiration Date:   12/28/2023     Referral Priority:   Routine     Referral Type:   Physical Therapy     Referral Reason:   Evaluate and Treat     Number of Visits Requested:   10       Test Results Pending at Discharge  Unresulted Labs (From admission, onward)    None        DISCHARGE DISPOSITION AND DESTINATION      Disposition: Home Health Care -  [facility]  Destination: [facility]                            Code Status At Discharge: Full Code    Physician Order for Life-Sustaining Treatment Document Status      No documents found

## 2023-06-28 NOTE — PLAN OF CARE
Problem: Adult Inpatient Plan of Care  Goal: Plan of Care Review  Outcome: Progressing  Flowsheets (Taken 6/28/2023 9165)  Progress: improving  Outcome Evaluation: OT eval completed. Rec d/c home w/ (A) and  OT vs oupt  Plan of Care Reviewed With: patient     Problem: Self-Care Deficit  Goal: Improved Ability to Complete Activities of Daily Living  Outcome: Progressing

## 2023-06-28 NOTE — ASSESSMENT & PLAN NOTE
Suspect BPV but need to r/o CVA  Pt ademently declining MRI brain stating she is willing to get it as outpt in open MRI- explained reason why pt needs MRI and she knows and undeerstands the importance   carotid US done pending read  ASA   PT/OT: cleared for home  Meclizine prn

## 2023-06-28 NOTE — PLAN OF CARE
Problem: Adult Inpatient Plan of Care  Goal: Plan of Care Review  Outcome: Progressing  Flowsheets (Taken 6/28/2023 0446)  Progress: improving  Outcome Evaluation: PT se completed  Plan of Care Reviewed With: patient

## 2023-06-28 NOTE — PROGRESS NOTES
Occupational Therapy -  Initial Evaluation     Patient: Kathy Clayton  Location: [facility] Clinical Decision Unit G107  MRN: [id number]  Today's date: 6/28/2023    HISTORY OF PRESENT ILLNESS     Kathy is a 89 y.o. female admitted on 6/27/2023 with Vertigo [R42]  Acute cerebrovascular insufficiency [I67.81]. Principal problem is Vertigo.    Past Medical History  Kathy has a past medical history of Abnormal ECG, Coronary artery disease, COVID-19 (December / January), Dilated aortic root (CMS/HCC), Hypertension, Lipid disorder, Mitral regurgitation, and Pulmonary nodules (2012).    History of Present Illness   Kathy Clayton is a 89 y.o. female with a past medical history of coronary artery disease not on therapy due to intolerance, history of SVT status post ablation, coronary artery disease not on aspirin or statin due to intolerance, hyperlipidemia who presents with dizziness starting in the morning associated with headache.      PRIOR LEVEL OF FUNCTION AND LIVING ENVIRONMENT     Prior Level of Function    Flowsheet Row Most Recent Value   Dominant Hand right   Ambulation assistive equipment   Transferring assistive equipment   Toileting independent   Bathing independent   Dressing independent   Eating independent   Prior Level of Function Comment IND w/ ADLs, use of rollator   Assistive Device/Animal Currently Used at Home cane, straight, walker, front-wheeled, shower chair        Prior Living Environment    Flowsheet Row Most Recent Value   People in Home child(monica), adult, other (see comments)  [son in law]   Current Living Arrangements home   Home Accessibility not wheelchair accessible, ramp to enter home, stairs within home (Group)   Living Environment Comment Pt lives with dtr and PEDRO PABLO in 2SH, 5 MEG, Full bath with tub shower avail on 1F, FF 2 F bed and WIS.        Occupational Profile    Flowsheet Row Most Recent Value   Environmental Supports and Barriers Supportive dtr who works  during the day, but [employer] avail        VITALS AND PAIN     OT Vitals    Date/Time Pulse SpO2 Pt Activity O2 Therapy BP BP Location BP Method Pt Position [facility]   06/28/23 0913 71 94 % At rest None (Room air) 166/88 Right upper arm Automatic Sitting VV   06/28/23 0915 74 -- -- -- 158/80 Right upper arm Automatic Standing VV      OT Pain    Date/Time Pain Type Rating: Rest Rating: Activity [facility]   06/28/23 0913 Pain Assessment;Pain Reassessment 0 - no pain 0 - no pain VV        Objective   OBJECTIVE     Start time:  0911  End time:  0941  Session Length: 30 min  Mode of Treatment: occupational therapy, co-treatment (skilled overlap w/ PT for expedited dispo planning)    General Observations  Patient received  (r'cved seated EOB). She was agreeable to therapy, no issues or concerns identified by nurse prior to session.      Precautions:                OT Eval and Treat - 06/28/23 0911        Cognition    Orientation Status oriented x 4     Affect/Mental Status WFL     Follows Commands follows one-step commands     Cognitive Function executive function deficit     Executive Function Deficit problem-solving/reasoning     Comment, Cognition Overall pleasant and cooperative t/o session. Single step commands. Overall WFL cognition but at termination of session mildly confused vs dcr problem-solving with request for bed controller despite being in recliner. Required incr time and education for explanation.        Vision Assessment/Intervention    Vision Assessment corrective lenses full-time   trifocals but not present in hospital at this time.       Upper Extremity Assessment    UE Assessment ROM and Strength WFL        Motor Skills    Coordination WFL     Comment, Motor Skills some arthritic changes noted to BUE hands but FMC WFL        Bed Mobility    Comment r'cved seated EOB at initiation of session        Mobility Belt    Mobility Belt Used for All Out of Bed Activity yes        Sit/Stand Transfer    Surface edge of bed;chair  with arm rests     [place] close supervision     Safety/Cues increased time to complete;verbal cues;hand placement;sequencing;technique     Assistive Device walker, front-wheeled     Transfer Comments From EOB - CLS + incr time + effortful. Later to recliner - CLS with cues for safe prep posture, hand placement, and rw management.        Toilet Transfer    Transfer Technique sit/stand     [place] close supervision     Safety/Cues increased time to complete;verbal cues;hand placement     Assistive Device grab bars/safety frame     Comment to/from standard heigh toilet - CLS and incr time to complete. Min cues but overall good hand placement and use of toilet seat for guided and controlled eccentric control        Functional Mobility    Distance hallway     Functional Mobility [place] close supervision     Safety/Cues increased time to complete     Assistive Device walker, front-wheeled     Functional Mobility Comments CLS for func mobility w/ RW. No overt LOB noted. Slow and effortful. Chair follow for safety in case of fatigue.        Grooming    Tasks washes, rinses and dries hands     [place] close supervision     Safety/Cues proper use of assistive device     Position sink side;supported standing     Comment Cues for safe RW management to sink - CLS for safety with standing grooming tasks. Fair standing tolerance and balance        Toileting    Tasks perform bladder hygiene;adjust/manage clothing     [place] minimum assist (75% or more patient effort)     Position supported standing     Setup Assistance obtain supplies     Comment MIN A x1 for (A) w/ clothing management. Set-up for toilet paper, able to complete marylou hygiene in supported standing w/ RW        Balance    Static Sitting Balance WFL;sitting, edge of bed     Dynamic Sitting Balance mild impairment;sitting, edge of bed     Sit to Stand Dynamic Balance mild impairment;supported     Static Standing Balance mild  impairment;supported     Dynamic Standing Balance mild impairment;supported     Comment, Balance CLS for safe OOB activity w/ RW. No overt LOB noted. INcr time and effortful at times - dcr activity tolerance, balance, and strength        Impairments/Safety Issues    Impairments Affecting Function balance;endurance/activity tolerance;pain;range of motion (ROM);strength                               Education Documentation  Self-Care, taught by Ann Rascon OT at 6/28/2023  3:19 PM.  Learner: Patient  Readiness: Acceptance  Method: Explanation  Response: Needs Reinforcement, Verbalizes Understanding  Comment: OT POC/role, safety and tech w/ func transfers and mobility w/ RW, safety w/ ADLs, econ/pacing, and d/c planning        Session Outcome  Patient upright, in chair at end of session, chair alarm on, all needs met, call light in reach, personal items in reach. Nursing notified about patient's performance, patient's position, and patient's response to therapy/activity.    AM-PAC™ - ADL (Current Function)     Putting on/taking off regular lower body clothing 3 - A Little   Bathing 3 - A Little   Toileting 3 - A Little   Putting on/taking off regular upper body clothing 3 - A Little   Help for taking care of personal grooming 3 - A Little   Eating meals 4 - None   AM-PAC™ ADL Score 19      ASSESSMENT AND PLAN     Progress Summary  OT eval completed. Pt is a 89 y.o. female adm w/ dizziness, mild drop in BP from sitting to standing, however not significant and (-) orthostatics during therapy sesion. Denied worsening lightheadedness/dizziness while OOB, just mild headache. CLS for STS, func mobility w/ RW, toilet transfer, and standing hand grooming tasks. MIN A x1 for clothing management during toileting. Pt limited by dcr balance, activity tolerance, strength, and arthritic pain. OT to cont w/ POC in acute setting. Rec d/c home w/ (A) for all ADLs and func mobility and HH OT. Pt reports she has been going to outpt prior  to adm - spoke with CM regarding HH vs outpt.    Patient/Family Therapy Goal Statement: To return home and resume outpt vs HH    OT Plan    Flowsheet Row Most Recent Value   Rehab Potential good, to achieve stated therapy goals at 06/28/2023 0911   Therapy Frequency 3 times/wk at 06/28/2023 0911   Planned Therapy Interventions activity tolerance training, adaptive equipment training, BADL retraining, functional balance retraining, occupation/activity based interventions, patient/caregiver education/training, transfer/mobility retraining at 06/28/2023 0911          OT Discharge Recommendations    Flowsheet Row Most Recent Value   OT Recommended Discharge Disposition home with assistance, home with home health at 06/28/2023 0911   Anticipated Equipment Needs At Discharge (OT) dressing equipment, shower chair, walker, front-wheeled at 06/28/2023 0911               OT Goals    Flowsheet Row Most Recent Value   Bed Mobility Goal 1    Activity/Assistive Device bed mobility activities, all at 06/28/2023 0911   [place] modified independence at 06/28/2023 0911   Time Frame by discharge at 06/28/2023 0911   Progress/Outcome goal ongoing at 06/28/2023 0911   Transfer Goal 1    Activity/Assistive Device sit-to-stand/stand-to-sit, toilet at 06/28/2023 0911   [place] supervision required at 06/28/2023 0911   Time Frame by discharge at 06/28/2023 0911   Progress/Outcome goal ongoing at 06/28/2023 0911   Dressing Goal 1    Activity/Adaptive Equipment lower body dressing at 06/28/2023 0911   [place] set-up required, supervision required at 06/28/2023 0911   Time Frame by discharge at 06/28/2023 0911   Progress/Outcome goal ongoing at 06/28/2023 0911   Toileting Goal 1    Activity/Assistive Device toileting skills, all at 06/28/2023 0911   [place] set-up required, supervision required at 06/28/2023 0911   Time Frame by discharge at 06/28/2023 0911   Progress/Outcome goal ongoing at 06/28/2023 0911

## 2023-06-28 NOTE — PLAN OF CARE
Care Coordination Admission Assessment Note    General Information:  Readmission Within the last 30 days: no previous admission in last 30 days  Does patient have a :    Patient-Specific Goals (include timeframe): return home w/ family and resume OP    Living Arrangements:  Arrived From: home  Current Living Arrangements: home  People in Home: child(monica), adult, other (see comments) (son in law)  Home Accessibility: not wheelchair accessible, ramp to enter home, stairs within home (Group)  Living Arrangement Comments: 2SH, 5 MEG with L rail going up, full bath 1st fl and B&B 2nd fl    Housing Stability and Financial Resources (SDOH):  In the last 12 months, was there a time when you were not able to pay the mortgage or rent on time?: No  In the last 12 months, how many places have you lived?: 1  In the last 12 months, was there a time when you did not have a steady place to sleep or slept in a shelter (including now)?: No  How hard is it for you to pay for the very basics like food, housing, medical care, and heating?: Not hard at all    Functional Status Prior to Admission:   Assistive Device/Animal Currently Used at Home: cane, straight, walker, front-wheeled, shower chair  Functional Status Comments: independent with DME, family does driving  IADL Comments: independent with DME     Supports and Services:  Current Outpatient/Agency/Support Group: none  Type of Current Home Care Services:    History of home care episode or rehab stay:      Discharge Needs Assessment:   Concerns to be Addressed: care coordination/care conferences, discharge planning  Current Discharge Risk: other (see comments) (age)  Anticipated Changes Related to Illness: none    Patient/Family Anticipated Discharge Plan:  Patient/Family Anticipates Transition To: home with family  Patient/Family Anticipated Services at Transition: rehabilitation services, outpatient care    Connection to Community  Not applicable      Patient  Choice:   Offered/Gave Vendor List: other (see comments)  Patient's Choice of Community Agency(s): Pt is known and active with OP PT at [facility] in [place], [place]       Anticipated Discharge Plan:  Met with patient. Provided education and contact information for Care Coordination services.: yes  Anticipated Discharge Disposition: home with outpatient services (Pt attends OP PT at [facility] [place], [place])     Transportation Needs (SDOH):  Transportation Concerns: no car  Transportation Anticipated: family or friend will provide  Is Out of Hospital DNR needed at discharge?: no    In the past 12 months, has lack of transportation kept you from medical appointments or from getting medications?: No  In the past 12 months, has lack of transportation kept you from meetings, work, or from getting things needed for daily living?: No    Concerns - comments:       Met w/ pt at the bedside.    Confirmed Pharmacy: [facility]    Pt resides with her dtr the past several years who has a home down the street from her home.    Pts home setup is for her dtrs since she lives there full time and does not stay in her own home.    Discussed PT/OT recommendations of continuing with OP PT vs HC.  Pt prefers to continue with her OP PT program and defers a referral to HC at this time.  Family has notified the site pt is in OBS in the hospital.      DCP: To home w/ resumption of OP PT at [facility] in [facility] following thru transition to home for needs/supports.

## 2023-06-28 NOTE — PLAN OF CARE
Plan of Care Review  Plan of Care Reviewed With: patient  Progress: improving  Outcome Evaluation: pt resting in chair. pt seen by PT/OT today. US carotids complete. pt to go for repeat CT head at 1500. possible d/c after resulted.

## 2023-06-28 NOTE — PROGRESS NOTES
Physical Therapy -  Initial Evaluation     Patient: Kathy Clayton  Location: [facility] Clinical Decision Unit G107  MRN: [id number]  Today's date: 6/28/2023    HISTORY OF PRESENT ILLNESS     Kathy is a 89 y.o. female admitted on 6/27/2023 with Vertigo [R42]  Acute cerebrovascular insufficiency [I67.81]. Principal problem is Vertigo.    Past Medical History  Kathy has a past medical history of Abnormal ECG, Coronary artery disease, COVID-19 (December / January), Dilated aortic root (CMS/HCC), Hypertension, Lipid disorder, Mitral regurgitation, and Pulmonary nodules (2012).    History of Present Illness   Kathy Clayton is a 89 y.o. female with a past medical history of coronary artery disease not on therapy due to intolerance, history of SVT status post ablation, coronary artery disease not on aspirin or statin due to intolerance, hyperlipidemia who presents with dizziness starting in the morning associated with headache.      PRIOR LEVEL OF FUNCTION AND LIVING ENVIRONMENT     Prior Level of Function    Flowsheet Row Most Recent Value   Dominant Hand right   Ambulation assistive equipment   Transferring assistive equipment   Toileting independent   Bathing independent   Dressing independent   Eating independent   Prior Level of Function Comment IND w/ ADLs, use of rollator   Assistive Device/Animal Currently Used at Home cane, straight, walker, front-wheeled, shower chair        Prior Living Environment    Flowsheet Row Most Recent Value   People in Home child(monica), adult, other (see comments)  [son in law]   Current Living Arrangements home   Home Accessibility not wheelchair accessible, ramp to enter home, stairs within home (Group)   Living Environment Comment Pt lives with dtr and PEDRO PABLO in 2SH, 5 MEG, Full bath with tub shower avail on 1F, FF 2 F bed and WIS.        VITALS AND PAIN     Objective   OBJECTIVE     Start time:  0917  End time:  0941  Session Length: 24 min  Mode of Treatment:  co-treatment, physical therapy (to expedite dc)    General Observations  Patient received  (sitting EOB). She was agreeable to therapy, no issues or concerns identified by nurse prior to session.      Precautions: fall              PT Eval and Treat - 06/28/23 0389        Cognition    Affect/Mental Status WFL        Sensory Assessment    Sensory Assessment sensation intact, lower extremities        Lower Extremity Assessment    LE Assessment ROM and Strength WFL        Motor Skills    Coordination heel to shin;WFL;bilateral        Mobility Belt    Mobility Belt Used for All Out of Bed Activity yes        Sit/Stand Transfer    Surface edge of bed     [place] supervision     Safety/Cues technique;sequencing;preparatory posture;proper use of assistive device;hand placement     Assistive Device walker, front-wheeled     Transfer Comments pt denies any lightheadedness, dizziness with initial sit to stand        Gait Training    [place], Gait close supervision     Safety/Cues proper use of assistive device;technique;preparatory posture;sequencing;hand placement     Assistive Device walker, front-wheeled     Distance in Feet 200 feet     Pattern step-through     Deviations/Abnormal Patterns gait speed decreased;step length decreased;manuel decreased;stride length decreased     Comment (Gait/Stairs) pt reports increased fatigue during ambulation. denies any dizziness, lightheadedness        Stairs Training    [place], Stairs close supervision     Safety/Cues sequencing;proper use of assistive device;maintaining precautions;preparatory posture;hand placement     Assistive Device railing     Number of Stairs 5     Ascending Stairs Technique step-to-step     Descending Stairs Technique step-to-step     Comment heavy reliance on HR for BUE support        Balance    Static Sitting Balance mild impairment     Dynamic Sitting Balance mild impairment     Sit to Stand Dynamic Balance mild impairment     Static  Standing Balance mild impairment     Dynamic Standing Balance mild impairment                               Education Documentation  Rehabilitation Therapy, taught by Иван Kennedy, PT at 6/28/2023  3:20 PM.  Learner: Patient  Readiness: Acceptance  Method: Explanation  Response: Needs Reinforcement  Comment: role of PT, POC, safe mobility techniques        Session Outcome  Patient reclined, in chair at end of session, chair alarm on, all needs met, call light in reach, personal items in reach. Nursing notified about patient's performance.    AM-PAC™ - Mobility (Current Function)     Turning form your back to your side while in flat bed without using bedrails 3 - A Little   Moving from lying on your back to sitting on the side of a flat bed without using bedrails 3 - A Little   Moving to and from a bed to a chair 3 - A Little   Standing up from a chair using your arms 3 - A Little   To walk in a hospital room 3 - A Little   Climbing 3-5 steps with a railing 3 - A Little   AM-PAC™ Mobility Score 18      ASSESSMENT AND PLAN     Progress Summary  pt ambulating in hallways with RW, completing stairclimbing without need of assistance. denies dizziness throughout evaluation    Patient/Family Therapy Goals Statement: home at dc    PT Plan    Flowsheet Row Most Recent Value   Therapy Frequency daily at 06/28/2023 1459   Planned Therapy Interventions balance training, bed mobility training, gait training, stair training, transfer training at 06/28/2023 1459          PT Discharge Recommendations    Flowsheet Row Most Recent Value   PT Recommended Discharge Disposition home with home health, home with assistance at 06/28/2023 1459   Anticipated Equipment Needs at Discharge (PT) other (see comments), none  [has RW] at 06/28/2023 1459   Patient/Family Therapy Goals Statement home at dc at 06/28/2023 1459               PT Goals    Flowsheet Row Most Recent Value   Bed Mobility Goal 1    Activity/Assistive Device bed mobility  activities, all at 06/28/2023 1459   Hopkins modified independence at 06/28/2023 1459   Time Frame by discharge at 06/28/2023 1459   Progress/Outcome goal ongoing at 06/28/2023 1459   Transfer Goal 1    Activity/Assistive Device all transfers at 06/28/2023 1459   [place] modified independence at 06/28/2023 1459   Time Frame by discharge at 06/28/2023 1459   Progress/Outcome goal ongoing at 06/28/2023 1459   Gait Training Goal 1    Activity/Assistive Device gait (walking locomotion) at 06/28/2023 1459   [place] modified independence at 06/28/2023 1459   Distance 200 at 06/28/2023 1459   Time Frame by discharge at 06/28/2023 1459   Progress/Outcome goal ongoing at 06/28/2023 1459   Stairs Goal 1    Activity/Assistive Device stairs, all skills at 06/28/2023 1459   [place] modified independence at 06/28/2023 1459   Number of Stairs 5 at 06/28/2023 1459   Time Frame by discharge at 06/28/2023 1459   Progress/Outcome goal ongoing at 06/28/2023 1459

## 2023-06-28 NOTE — ASSESSMENT & PLAN NOTE
Coronary CT angiogram done [facility] June 2021 mild nonobstructive disease aortic root was 4.1 on that study   As outpt not on therapy cannot tolerate aspirin or statins no symptoms of angina  Started on ASA for stroke prevention  Pt follows with Dr. Willard [facility] at [facility]

## 2023-06-28 NOTE — PLAN OF CARE
Problem: Adult Inpatient Plan of Care  Goal: Plan of Care Review  Outcome: Progressing  Flowsheets (Taken 6/28/2023 5855)  Progress: improving  Outcome Evaluation: NIH=0, neuro checks intact. Patient reports dizziness with change in position. Patient ambulated in room with walker with this RN. Reported mild dizziness and leg weakness. NSR on monitor, vital signs within parameters. Patient to see neurology and PT/OT today and go for carotid ultrasound today.  Plan of Care Reviewed With: patient

## 2023-06-28 NOTE — HOSPITAL COURSE
Kathy is a 89 y.o. female admitted on 6/27/2023 with Vertigo [R42]  Acute cerebrovascular insufficiency [I67.81]. Principal problem is Vertigo.    Past Medical History  Kathy has a past medical history of Abnormal ECG, Coronary artery disease, COVID-19 (December / January), Dilated aortic root (CMS/HCC), Hypertension, Lipid disorder, Mitral regurgitation, and Pulmonary nodules (2012).    History of Present Illness   Kathy Clayton is a 89 y.o. female with a past medical history of coronary artery disease not on therapy due to intolerance, history of SVT status post ablation, coronary artery disease not on aspirin or statin due to intolerance, hyperlipidemia who presents with dizziness starting in the morning associated with headache.

## 2023-06-28 NOTE — CONSULTS
Neurology Consult Note    I was asked to see Kathy Clayton who is a 89 y.o. female history of hypertension hyperlipidemia CAD presents with episodes of nonvertiginous as well as true vertiginous lightheadedness without rotational component she did report a right ear clicking she has had similar symptoms before has seen ENT she denies any sensorimotor cognitive complaints also feels a general lethargy in the emergency department her blood pressure is 173/80 symptoms have moderated denies otalgia or tinnitus except for some occasional atypical sounds in her right ear.  Noncontrast head CT shows no territorial infarct mass or hemorrhage.  Patient declines MRI.  She feels better except for generalized weakness at this time.  Does see an ENT in [place].    Review of Systems  All other systems reviewed and negative except as noted in the HPI.    Allergies: Adenosine, Anesthesia s/i-40  [propofol], Cephalosporins, Desvenlafaxine succinate, Dexbrompheniramine, Diltiazem, Escitalopram oxalate, Ezetimibe, Fluoxetine hcl, Lactase, Metoprolol, Mirtazapine, Penicillins, Plant stanol leonila, Sertraline hcl, Statins-hmg-coa reductase inhibitors, and Sulfa (sulfonamide antibiotics)    Current Facility-Administered Medications   Medication Dose Route Frequency Provider Last Rate Last Admin   • acetaminophen (TYLENOL) tablet 650 mg  650 mg oral q4h PRN Jeana Sharif DO        Or   • acetaminophen (TYLENOL) suppository 650 mg  650 mg rectal q4h PRN Jeana Sharif, DO        Or   • acetaminophen (TYLENOL) 650 mg/20.3 mL solution 650 mg  650 mg oral q4h PRN Jeana Sharif DO       • aspirin chewable tablet 81 mg  81 mg oral Daily Varsha Burnette MD   81 mg at 06/28/23 1024   • carboxymethylcellulose (REFRESH PLUS) 0.5 % ophthalmic dropperette 1 drop  1 drop Both Eyes 3x daily PRN Jeana Sharif DO       • cholecalciferol (vitamin D3) tablet 1,000 Units  1,000 Units oral Daily Jeana Sharif, DO       •  glucose chewable tablet 16-32 g of dextrose  16-32 g of dextrose oral PRN Jeana Sharif, DO        Or   • dextrose 40 % oral gel 15-30 g of dextrose  15-30 g of dextrose oral PRN Jeana Sharif, DO        Or   • glucagon (GLUCAGEN) injection 1 mg  1 mg intramuscular PRN Jeana Sharif, DO        Or   • dextrose 50 % in water (D50) injection 12.5 g  25 mL intravenous PRN Jeana Sharif, DO       • folic acid (FOLVITE) tablet 400 mcg  400 mcg oral Daily Jeana Sharif, DO       • hydrocortisone 2.5 % cream 1 Application  1 Application Topical 2x daily PRN Jeana Sharif DO       • [Provider Managed Hold] LORazepam (ATIVAN) tablet 0.5 mg  0.5 mg oral Nightly PRN Jeana Sharif DO       • meclizine (ANTIVERT) tablet 25 mg  25 mg oral TID Varsha Burnette MD       • pantoprazole (PROTONIX) tablet,delayed release (DR/EC) 40 mg  40 mg oral Daily Jeana Sharif, DO       • simethicone (MYLICON) chewable tablet 160 mg  160 mg oral 4x daily PRN Jeana Sharif DO       • thiamine (VITAMIN B1) tablet 50 mg  50 mg oral Daily Jeana Sharif, DO           Medical History:   Past Medical History:   Diagnosis Date   • Abnormal ECG    • Coronary artery disease    • COVID-19 December / January   • Dilated aortic root (CMS/HCC)    • Hypertension    • Lipid disorder    • Mitral regurgitation    • Pulmonary nodules 2012       Surgical History:   Past Surgical History:   Procedure Laterality Date   • CARDIAC ELECTROPHYSIOLOGY MAPPING AND ABLATION     • CATARACT EXTRACTION, BILATERAL     • HYSTERECTOMY         Social History:   Social History     Socioeconomic History   • Marital status:      Spouse name: None   • Number of children: None   • Years of education: None   • Highest education level: None   Tobacco Use   • Smoking status: Never     Passive exposure: Never   • Smokeless tobacco: Never   Substance and Sexual Activity   • Alcohol use: No   • Drug use: Defer   • Sexual activity: Defer   Social  History Narrative    Retired -  in [employer]    lives with daughter     Social Determinants of Health     Food Insecurity: No Food Insecurity (2023)    Hunger Vital Sign    • Worried About Running Out of Food in the Last Year: Never true    • Ran Out of Food in the Last Year: Never true       Family History:   Family History   Problem Relation Age of Onset   • Hyperlipidemia Biological Mother    • Diabetes Biological Father    • Hyperlipidemia Biological Father    • [place]'s disease Biological Father    • [place]'s disease Biological Sister    • Multiple sclerosis Biological Brother    • Heart disease Father's Brother    • Stroke Father's Brother    • Stroke Mother's Sister    • Cystic fibrosis Niece        Objective     Temperature: Temp (24hrs), Av.6 °C (97.9 °F), Min:36.4 °C (97.5 °F), Max:36.8 °C (98.3 °F)     BP Max:  Systolic (24hrs), Av , Min:125 , Max:174      BP Humphreys:  Diastolic (24hrs), Av, Min:68, Max:88    Recent:    Patient Vitals for the past 4 hrs:   BP Temp Temp src Pulse Resp SpO2   23 1103 125/68 36.4 °C (97.5 °F) Oral 66 18 95 %   23 0900 (!) 161/83 36.8 °C (98.3 °F) Oral 73 18 93 %         Physical/Neurologic Exam  Normal cephalic atraumatic.  Very pleasant euthymic well-groomed organized historian spelling and logic normal the mental status exam reveals intact language, comprehension, repetition, fluency, and executive function.  PPE not in place oculomotor versions are full smooth pursuit saccade and convergence normal tongue midline without fasciculation or gross atrophy.  Muscles of mastication, power full.  V1 through V3 symmetric.  Motor exam, no pronator drift.  Normal bulk and tone.  Power is 5/5 in the upper and lower extremities.  Sensory exam, intact primary modalities.  Gait not walked the deep tendon reflexes are symmetrical 1+ throughout.    Coordination exam shows no dysmetria or tremor, finger-to-nose unremarkable.    Labs  Lab  Results   Component Value Date    WBC 5.63 06/28/2023    HGB 11.9 06/28/2023    HCT 36.4 06/28/2023     06/28/2023    CHOL 209 (H) 06/28/2023    TRIG 122 06/28/2023    HDL 61 (H) 06/28/2023     06/28/2023    K 3.8 06/28/2023     06/28/2023    CREATININE 0.7 06/28/2023    BUN 19 06/28/2023    CO2 30 06/28/2023    HGBA1C 5.5 06/28/2023     No results found for: ESR, CRP  Radiology Imaging    XR CHEST 1 VW    Narrative  CLINICAL HISTORY: vertigo  Comparison: Chest x-ray dated 11/09/05.    --    Impression  No active disease in the chest.    COMMENT:    Tubes/Lines: None.    Lungs/Pleura: Mild bibasilar scarring and/or subsegmental atelectasis without  consolidation, sizable pleural effusion, or pneumothorax in this semierect AP  portable chest x-ray.    Cardiomediastinal silhouette: Cardiomegaly and slightly tortuous calcified  thoracic aorta as well as mitral annular calcification noted.    Regional Soft Tissue/Osseous Structures: Mild scoliotic curvature.      CT HEAD WITHOUT IV CONTRAST    Result Date: 6/27/2023  Narrative: CLINICAL HISTORY: vertigo, headache, unsteady on feet Comparison: No relevant prior studies     Impression: IMPRESSION: No acute intracranial abnormality. COMMENT: Noncontrast CT of the head was performed.  One or more dose reduction techniques (e.g., Automated exposure control, adjustment of the mA and/or kV according to the patient size, use of iterative reconstruction technique) utilized for this examination. There is prominence of the ventricles and sulci compatible with age appropriate volume loss.  Mild patchy periventricular and subcortical white matter hypoattenuation identified, nonspecific but likely sequela of mild chronic microangiopathy.  There is no evidence of acute intracranial hemorrhage, large acute territorial infarct, extra-axial collection, or mass-effect.  Ventricles are midline without evidence of hydrocephalus. Partially empty sella noted. Visualized  paranasal sinuses and mastoid air cells are clear. Intracranial atherosclerotic calcifications noted. No calvarial fracture or sizable scalp hematoma.    X-RAY CHEST 1 VIEW    Result Date: 6/27/2023  Narrative: CLINICAL HISTORY: vertigo Comparison: Chest x-ray dated 11/09/05.     Impression: IMPRESSION: No active disease in the chest. COMMENT: Tubes/Lines: None. Lungs/Pleura: Mild bibasilar scarring and/or subsegmental atelectasis without consolidation, sizable pleural effusion, or pneumothorax in this semierect AP portable chest x-ray. Cardiomediastinal silhouette: Cardiomegaly and slightly tortuous calcified thoracic aorta as well as mitral annular calcification noted. Regional Soft Tissue/Osseous Structures: Mild scoliotic curvature.     Recent Results (from the past 24 hour(s))   ECG 12 lead    Collection Time: 06/27/23  4:11 PM   Result Value Ref Range    Ventricular rate 70     Atrial rate 70     MI Interval 186     QRS duration 140     QT Interval 464     QTC Calculation(Bazett) 501     P Axis 93     R Axis 57     T Wave Axis 20    CBC and differential    Collection Time: 06/27/23  4:24 PM   Result Value Ref Range    WBC 6.37 3.80 - 10.50 K/uL    RBC 3.75 (L) 3.93 - 5.22 M/uL    Hemoglobin 11.5 (L) 11.8 - 15.7 g/dL    Hematocrit 36.1 35.0 - 45.0 %    MCV 96.3 83.0 - 98.0 fL    MCH 30.7 28.0 - 33.2 pg    MCHC 31.9 (L) 32.2 - 35.5 g/dL    RDW 13.0 11.7 - 14.4 %    Platelets 199 150 - 369 K/uL    MPV 10.6 9.4 - 12.3 fL    Differential Type Auto     nRBC 0.0 <=0.0 %    Immature Granulocytes 0.2 %    Neutrophils 66.2 %    Lymphocytes 25.3 %    Monocytes 8.0 %    Eosinophils 0.0 %    Basophils 0.3 %    Immature Granulocytes, Absolute 0.01 0.00 - 0.08 K/uL    Neutrophils, Absolute 4.22 1.70 - 7.00 K/uL    Lymphocytes, Absolute 1.61 1.20 - 3.50 K/uL    Monocytes, Absolute 0.51 0.28 - 0.80 K/uL    Eosinophils, Absolute 0.00 (L) 0.04 - 0.36 K/uL    Basophils, Absolute 0.02 0.01 - 0.10 K/uL   Basic metabolic panel     Collection Time: 06/27/23  4:24 PM   Result Value Ref Range    Sodium 138 136 - 145 mEQ/L    Potassium 4.2 3.5 - 5.1 mEQ/L    Chloride 103 98 - 107 mEQ/L    CO2 30 21 - 31 mEQ/L    BUN 25 7 - 25 mg/dL    Creatinine 0.8 0.6 - 1.2 mg/dL    Glucose 91 70 - 99 mg/dL    Calcium 9.3 8.6 - 10.3 mg/dL    eGFR >60.0 >=60.0 mL/min/1.73m*2    Anion Gap 5 3 - 15 mEQ/L   HS Troponin (with 2 hour reflex)    Collection Time: 06/27/23  4:24 PM   Result Value Ref Range    High Sens Troponin I 5.4 <15.0 pg/mL   UA Reflex to Culture (Macroscopic)    Collection Time: 06/27/23  4:24 PM    Specimen: Urine, Clean Catch   Result Value Ref Range    Color, Urine Colorless Yellow, Colorless    Clarity, Urine Clear Clear    Specific Gravity, Urine 1.012 1.005 - 1.030    pH, Urine 7.5 4.5 - 8.0    Leukocyte Esterase Negative Negative    Nitrite, Urine Negative Negative    Protein, Urine Negative Negative    Glucose, Urine Negative Negative mg/dL    Ketones, Urine Negative Negative mg/dL    Urobilinogen, Urine 0.2 <2.0 EU/dL EU/dL    Bilirubin, Urine Negative Negative mg/dL    Blood, Urine Negative Negative   HIGH SENSITIVE TROPONIN I (NO REFLEX)    Collection Time: 06/27/23  7:30 PM   Result Value Ref Range    High Sens Troponin I 6.1 <15.0 pg/mL   Basic metabolic panel    Collection Time: 06/28/23  3:24 AM   Result Value Ref Range    Sodium 140 136 - 145 mEQ/L    Potassium 3.8 3.5 - 5.1 mEQ/L    Chloride 103 98 - 107 mEQ/L    CO2 30 21 - 31 mEQ/L    BUN 19 7 - 25 mg/dL    Creatinine 0.7 0.6 - 1.2 mg/dL    Glucose 95 70 - 99 mg/dL    Calcium 8.9 8.6 - 10.3 mg/dL    eGFR >60.0 >=60.0 mL/min/1.73m*2    Anion Gap 7 3 - 15 mEQ/L   Lipid panel    Collection Time: 06/28/23  3:24 AM   Result Value Ref Range    Triglycerides 122 <150 mg/dL    Cholesterol 209 (H) <=200 mg/dL    HDL 61 (H) <55 mg/dL    LDL Calculated 124 (H) <=100 mg/dL    Non-HDL, Calculated 148 mg/dL    RISK 3.4 <=5.0   Hemoglobin A1C    Collection Time: 06/28/23  3:24 AM   Result  Value Ref Range    Hemoglobin A1C 5.5 <5.7 %    Estimated Ave Glucose 111 mg/dL   CBC    Collection Time: 06/28/23  3:24 AM   Result Value Ref Range    WBC 5.63 3.80 - 10.50 K/uL    RBC 3.82 (L) 3.93 - 5.22 M/uL    Hemoglobin 11.9 11.8 - 15.7 g/dL    Hematocrit 36.4 35.0 - 45.0 %    MCV 95.3 83.0 - 98.0 fL    MCH 31.2 28.0 - 33.2 pg    MCHC 32.7 32.2 - 35.5 g/dL    RDW 12.8 11.7 - 14.4 %    Platelets 189 150 - 369 K/uL    MPV 10.2 9.4 - 12.3 fL     Microbiology Results     ** No results found for the last 720 hours. 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

## 2023-06-29 LAB
BSA FOR ECHO PROCEDURE: 1.74 M2
LEFT CCA DIST DIAS: 15.99 CM/S
LEFT CCA DIST SYS: 51.52 CM/S
LEFT CCA MID DIAS: 17.6 CM/S
LEFT CCA MID SYS: 54.75 CM/S
LEFT CCA PROX DIAS: 12.76 CM/S
LEFT CCA PROX SYS: 43.45 CM/S
LEFT ECA DIAS: 0 CM/S
LEFT ECA SYS: 37.23 CM/S
LEFT ICA DIST DIAS: 25.37 CM/S
LEFT ICA DIST SYS: 71.93 CM/S
LEFT ICA MID DIAS: 12.81 CM/S
LEFT ICA MID SYS: 43.87 CM/S
LEFT ICA PROX DIAS: 14.37 CM/S
LEFT ICA PROX SYS: 43.44 CM/S
LEFT ICA/CCA SYS: 1.4
LEFT VERTEBRAL DIAS: 32.3 CM/S
LEFT VERTEBRAL SYS: 122.94 CM/S
LT ECA PROX EDV: 0 CM/S
LT ECA PROX PSV: 37.23 CM/S
LT VERTEBRAL PROX EDV: 32.3 CM/S
LT VERTEBRAL PROX PSV: 122.94 CM/S
RIGHT CCA DIST DIAS: 11.61 CM/S
RIGHT CCA DIST SYS: 49.43 CM/S
RIGHT CCA MID DIAS: 12.91 CM/S
RIGHT CCA MID SYS: 50.74 CM/S
RIGHT CCA PROX DIAS: 11.61 CM/S
RIGHT CCA PROX SYS: 53.35 CM/S
RIGHT ECA DIAS: 0 CM/S
RIGHT ECA SYS: 46.23 CM/S
RIGHT ICA DIST DIAS: 18.75 CM/S
RIGHT ICA DIST SYS: 63.56 CM/S
RIGHT ICA MID DIAS: 14.74 CM/S
RIGHT ICA MID SYS: 47.89 CM/S
RIGHT ICA PROX DIAS: 7.76 CM/S
RIGHT ICA PROX SYS: 27.82 CM/S
RIGHT ICA/CCA SYS: 1.29
RIGHT VERTEBRAL DIAS: 13.7 CM/S
RIGHT VERTEBRAL SYS: 46.22 CM/S
RT ECA PROC EDV: 0 CM/S
RT VERTEBRAL PROX EDV: 13.7 CM/S

## 2023-06-29 PROCEDURE — 63700000 HC SELF-ADMINISTRABLE DRUG: Performed by: HOSPITALIST

## 2023-06-29 PROCEDURE — G0378 HOSPITAL OBSERVATION PER HR: HCPCS

## 2023-06-29 PROCEDURE — 97530 THERAPEUTIC ACTIVITIES: CPT | Mod: GP,KX

## 2023-06-29 PROCEDURE — 99233 SBSQ HOSP IP/OBS HIGH 50: CPT | Performed by: HOSPITALIST

## 2023-06-29 PROCEDURE — 97116 GAIT TRAINING THERAPY: CPT | Mod: GP,KX

## 2023-06-29 PROCEDURE — 97530 THERAPEUTIC ACTIVITIES: CPT | Mod: GO

## 2023-06-29 PROCEDURE — 97535 SELF CARE MNGMENT TRAINING: CPT | Mod: GO

## 2023-06-29 RX ADMIN — MECLIZINE HYDROCHLORIDE 25 MG: 25 TABLET ORAL at 08:21

## 2023-06-29 RX ADMIN — MECLIZINE HYDROCHLORIDE 25 MG: 25 TABLET ORAL at 13:45

## 2023-06-29 RX ADMIN — MECLIZINE HYDROCHLORIDE 25 MG: 25 TABLET ORAL at 20:19

## 2023-06-29 RX ADMIN — ASPIRIN 81 MG 81 MG: 81 TABLET ORAL at 08:21

## 2023-06-29 RX ADMIN — PANTOPRAZOLE SODIUM 40 MG: 40 TABLET, DELAYED RELEASE ORAL at 08:21

## 2023-06-29 ASSESSMENT — COGNITIVE AND FUNCTIONAL STATUS - GENERAL
WALKING IN HOSPITAL ROOM: 3 - A LITTLE
HELP NEEDED FOR PERSONAL GROOMING: 3 - A LITTLE
CLIMB 3 TO 5 STEPS WITH RAILING: 3 - A LITTLE
MOVING TO AND FROM BED TO CHAIR: 3 - A LITTLE
AFFECT: WFL
AFFECT: WFL
WALKING IN HOSPITAL ROOM: 3 - A LITTLE
DRESSING REGULAR LOWER BODY CLOTHING: 2 - A LOT
DRESSING REGULAR UPPER BODY CLOTHING: 3 - A LITTLE
STANDING UP FROM CHAIR USING ARMS: 3 - A LITTLE
HELP NEEDED FOR BATHING: 2 - A LOT
CLIMB 3 TO 5 STEPS WITH RAILING: 2 - A LOT
MOVING TO AND FROM BED TO CHAIR: 3 - A LITTLE
EATING MEALS: 4 - NONE
TOILETING: 3 - A LITTLE
STANDING UP FROM CHAIR USING ARMS: 3 - A LITTLE

## 2023-06-29 NOTE — PROGRESS NOTES
Physical Therapy -  Daily Treatment/Progress Note     Patient: Kathy Clayton  Location: [facility] Clinical Decision Unit G107  MRN: [id number]  Today's date: 6/29/2023    HISTORY OF PRESENT ILLNESS     Kathy is a 89 y.o. female admitted on 6/27/2023 with Vertigo [R42]  Acute cerebrovascular insufficiency [I67.81]. Principal problem is Vertigo.    Past Medical History  Kathy has a past medical history of Abnormal ECG, Coronary artery disease, COVID-19 (December / January), Dilated aortic root (CMS/HCC), Hypertension, Lipid disorder, Mitral regurgitation, and Pulmonary nodules (2012).    History of Present Illness   Kathy Clayton is a 89 y.o. female with a past medical history of coronary artery disease not on therapy due to intolerance, history of SVT status post ablation, coronary artery disease not on aspirin or statin due to intolerance, hyperlipidemia who presents with dizziness starting in the morning associated with headache.      PRIOR LEVEL OF FUNCTION AND LIVING ENVIRONMENT     Prior Level of Function    Flowsheet Row Most Recent Value   Dominant Hand right   Ambulation assistive equipment   Transferring assistive equipment   Toileting independent   Bathing independent   Dressing independent   Eating independent   Prior Level of Function Comment IND w/ ADLs, use of rollator   Assistive Device Currently Used at Home cane, straight, walker, front-wheeled, shower chair        Prior Living Environment    Flowsheet Row Most Recent Value   People in Home child(monica), adult, other (see comments)  [son in law]   Current Living Arrangements home   Home Accessibility not wheelchair accessible, ramp to enter home, stairs within home (Group)   Living Environment Comment Pt lives with dtr and PEDRO PABLO in 2SH, 5 MEG, Full bath with tub shower avail on 1F, FF 2 F bed and WIS.        VITALS AND PAIN     PT Vitals    Date/Time Pulse HR Source SpO2 Pt Activity O2 Therapy BP BP Location BP Method Pt  Position [facility]   06/29/23 1422 72 Monitor 95 % At rest None (Room air) 117/70 Right upper arm Automatic Sitting SD   06/29/23 1427 -- -- -- -- -- 117/69 Right upper arm Automatic Standing SD   06/29/23 1432 -- -- -- -- -- 124/73 Right upper arm Automatic Sitting SD   06/29/23 1443 76 Monitor 96 % At rest None (Room air) 121/70 Right upper arm Automatic Sitting SD      PT Pain    Date/Time Pain Type Rating: Rest Rating: Activity [facility]   06/29/23 1423 Pain Assessment;Pain Reassessment 0 - no pain 0 - no pain VV        Objective   OBJECTIVE     Start time:  1420  End time:  1443  Session Length: 23 min  Mode of Treatment: physical therapy, co-treatment (expedite d/c)    General Observations  Patient received in chair, upright. She was agreeable to therapy, no issues or concerns identified by nurse prior to session. Pt reports lightheadedness, dizziness, fogginess, heavy eyes, and weak legs feeling unsteady when standing.    Precautions: fall              PT Eval and Treat - 06/29/23 1420        Cognition    Orientation Status oriented x 3     Affect/Mental Status WFL     Follows Commands follows one-step commands;over 90% accuracy     Comment, Cognition Pt pleasant during session, cooperative w/ treatment.        Bed Mobility    Comment not assessed, pt received OOB        Mobility Belt    Mobility Belt Used for All Out of Bed Activity yes        Sit/Stand Transfer    Surface chair with arm rests     [place] close supervision     Safety/Cues increased time to complete;hand placement;proper use of assistive device;technique;sequencing     Assistive Device walker, front-wheeled     Transfer Comments Pt reports diziness, lightheadedness, fogginess (heavy head), and weak legs w/ each sit to stand. Pt BP did not display any significant decr, beginning measurement at 117/70 prior to stand and 117/69 once standing. Lightheadedness remained so pt was initially sat down and Cornelio were performed. When sitting, pt required VCs for  hand and body placement prior to sitting and demonstrated fair eccentric control. Pt reported improvement in fogginess/lightheadedness once sitting.        Gait Training    [place], Gait close supervision     Safety/Cues increased time to complete;gait deviations;proper use of assistive device;technique;sequencing     Assistive Device walker, front-wheeled     Distance in Feet 100 feet     Pattern step-through     Deviations/Abnormal Patterns gait speed decreased;stride length decreased;step length decreased;manuel decreased     Comment (Gait/Stairs) Pt reports lightheadedness w/ no incr or decr during amb. Prior to amb, BP measured as 124/73. Post activity pt BP was 121/70. Pt reports legs feeling weak during amb but reported that they began to feel stronger the more she ambulated. Pt required significant inr in time to perform.        Stairs Training    Comment not assessed d/t lightheadedness and pt tolerance        Balance    Static Sitting Balance mild impairment;sitting in chair     Dynamic Sitting Balance mild impairment;sitting in chair     Sit to Stand Dynamic Balance mild impairment;supported     Static Standing Balance mild impairment;supported     Dynamic Standing Balance mild impairment;supported     Comment, Balance Pt was CLS for all standing balance activities w/ RW. Pt reports feeling unsteady when amb and that legs feel overall weak althought pt reports that improved as she amb.        Lower Extremity (Therapeutic Exercise)    Exercise Position/Type seated     General Exercise ankle pumps;LAQ (long arc quad)     Reps and Sets 1x10     Comment Pt educated on importance of performance of Cornelio to help maintain strength and mobility        Impairments/Safety Issues    Impairments Affecting Function balance;endurance/activity tolerance;other (see comments)   lightheadedness                       Education Documentation  Signs/Symptoms, taught by Lisa Hilton at 6/29/2023  3:11 PM.  Learner:  Patient  Readiness: Acceptance  Method: Explanation  Response: Verbalizes Understanding  Comment: role of PT, POC, d/c planning, performance of Cornelio, proper AD use, safe mobility techniques    Risk Factors, taught by Lisa Hilton at 6/29/2023  3:11 PM.  Learner: Patient  Readiness: Acceptance  Method: Explanation  Response: Verbalizes Understanding  Comment: role of PT, POC, d/c planning, performance of Cornelio, proper AD use, safe mobility techniques        Session Outcome  Patient in chair, upright at end of session, chair alarm on, all needs met, call light in reach, personal items in reach. Nursing notified about change in vital signs, patient's response to therapy/activity, patient's performance, and patient's position.    AM-PAC™ - Mobility (Current Function)     Turning form your back to your side while in flat bed without using bedrails 3 - A Little   Moving from lying on your back to sitting on the side of a flat bed without using bedrails 3 - A Little   Moving to and from a bed to a chair 3 - A Little   Standing up from a chair using your arms 3 - A Little   To walk in a hospital room 3 - A Little   Climbing 3-5 steps with a railing 2 - A Lot   AM-PAC™ Mobility Score 17      ASSESSMENT AND PLAN     Progress Summary  During today's session, pt reported feeling lightheaded and foggy when standing, w/ heavy eyes and weak legs making her feel unsteady. Pt initial /70 decr to 117/69 when standing, not demonstrating orthostatic hypotension. Prior to amb, pt BP measured as 124/73 and decr to 121/70 s/p activity. Pt reported improvements in leg weakness during amb but lightheadness remained unchanged throughout. Pt performed all activities w/ CLS. Pt required significant incr in time to perform sit to stand w/ multiple rocking motions forward for momentum and would have potentially benefited from minAx1 to perform successfully w/ decr time. D/t pt's current functional mobility, current home set up, and assist  level at home, PT recs d/c home w/ assistance/home health w/ all functional mobility and ADLs. If not obtainable, PT recs d/c to SNF.    Patient/Family Therapy Goals Statement: home at dc    PT Plan    Flowsheet Row Most Recent Value   Therapy Frequency daily at 06/28/2023 1459   Planned Therapy Interventions balance training, bed mobility training, gait training, stair training, transfer training at 06/28/2023 1459          PT Discharge Recommendations    Flowsheet Row Most Recent Value   PT Recommended Discharge Disposition home with assistance, home with home health, skilled nursing facility at 06/29/2023 1420   Anticipated Equipment Needs at Discharge (PT) other (see comments), none  [has RW] at 06/28/2023 1459               PT Goals    Flowsheet Row Most Recent Value   Bed Mobility Goal 1    Activity/Assistive Device bed mobility activities, all at 06/28/2023 1459   Sun modified independence at 06/28/2023 1459   Time Frame by discharge at 06/28/2023 1459   Progress/Outcome continuing progress toward goal at 06/29/2023 1420   Transfer Goal 1    Activity/Assistive Device all transfers at 06/28/2023 1459   Sun modified independence at 06/28/2023 1459   Time Frame by discharge at 06/28/2023 1459   Progress/Outcome continuing progress toward goal at 06/29/2023 1420   Gait Training Goal 1    Activity/Assistive Device gait (walking locomotion) at 06/28/2023 1459   Sun modified independence at 06/28/2023 1459   Distance 200 at 06/28/2023 1459   Time Frame by discharge at 06/28/2023 1459   Progress/Outcome continuing progress toward goal at 06/29/2023 1420   Stairs Goal 1    Activity/Assistive Device stairs, all skills at 06/28/2023 1459   Sun modified independence at 06/28/2023 1459   Number of Stairs 5 at 06/28/2023 1459   Time Frame by discharge at 06/28/2023 1459   Progress/Outcome continuing progress toward goal at 06/29/2023 1420

## 2023-06-29 NOTE — PLAN OF CARE
Plan of Care Review  Plan of Care Reviewed With: patient  Progress: improving  Outcome Evaluation: Pt continues with dizziness today and feeling like her legs are heavy/weak. PT worked with pt, recommending home with home health vs. SNF. Plan of care reviewed with pt and daughter.

## 2023-06-29 NOTE — NURSING NOTE
"Care plan-Pt was going to d/c home yesterday. Feeling \"shaky and weak\" BP running high. Pt was able to eat had mild knee pain relieved with Tylenol.  Ativan given felt better, slept well. Pt will get open MRI out pt. AAOx4  "

## 2023-06-29 NOTE — PROGRESS NOTES
Referral per CM for SN via [facility].  Reviewing chart for home care criteria/ will assess and contact if able to arrange for services.

## 2023-06-29 NOTE — PROGRESS NOTES
Spoke with daughter, Candace, who confirmed visit address as [address] [place] [address]-patient will be staying with her daughter, not at her own address down the street.  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  She stated she believes discharge will be 6/30, and will be available to assist her mother.   [facility] will contact within 24 h of discharge to make visit arrangment.

## 2023-06-29 NOTE — PROGRESS NOTES
Hospital Medicine Service -  Daily Progress Note       SUBJECTIVE   Interval History:   Dizziness better     OBJECTIVE      Vital signs in last 24 hours:  Temp:  [36.4 °C (97.6 °F)-36.7 °C (98 °F)] 36.7 °C (98 °F)  Heart Rate:  [59-78] 76  Resp:  [18] 18  BP: (117-159)/(66-88) 121/70  No intake or output data in the 24 hours ending 06/29/23 1546    PHYSICAL EXAMINATION      Physical Exam  GENERAL APPEARANCE  Awake, alert    MUCUS MEMBRANES  Moist    LUNGS  CTAB, no w/r/r    CHEST  RRR, no m/g/r    ABDOMEN  Soft, NT, ND, +BS    EXTREMITIES  No c/c/e    SKIN  No obvious rash    HEENT  Anicteric    NEURO  Moving all extremities, Ox3, coherent, no dysarthria           LINES, CATHETERS, DRAINS, AIRWAYS, AND WOUNDS   Lines, Drains, and Airways:  Wounds (agree with documentation and present on admission):  External Urinary Catheter Female (one size) (Active)   Number of days: 2         Comments:      LABS / IMAGING / TELE      Labs  Results from last 7 days   Lab Units 06/28/23  0324 06/27/23  1624   WBC K/uL 5.63 6.37   HEMOGLOBIN g/dL 11.9 11.5*   HEMATOCRIT % 36.4 36.1   PLATELETS K/uL 189 199    Results from last 7 days   Lab Units 06/28/23  0324 06/27/23  1624   SODIUM mEQ/L 140 138   POTASSIUM mEQ/L 3.8 4.2   CHLORIDE mEQ/L 103 103   CO2 mEQ/L 30 30   BUN mg/dL 19 25   CREATININE mg/dL 0.7 0.8   GLUCOSE mg/dL 95 91                                          Microbiology Results     ** No results found for the last 720 hours. **               No results found for: COVID19    Imaging  Ultrasound carotid bilateral    Result Date: 6/29/2023  Procedure: The extracranial cervical portions of the carotid and vertebral arteries were analyzed utilizing B mode, imaging with grey scale, color flow and spectral analysis of the doppler pulse curves. Right The extracranial carotid vessels are defined. There is no obstruction on transverse imaging. Non elevated flow velocities are seen in the common and internal carotid  arteries. Vertebral artery flow is antegrade. Left The extracranial carotid vessels are defined. There is no obstruction on transverse imaging. Non elevated flow velocities are seen in the common and internal carotid arteries. Vertebral artery flow is antegrade. Impression: No hemodynamically significant stenosis identified in the visualized carotid circulation.     CT HEAD WITHOUT IV CONTRAST    Result Date: 6/28/2023  CLINICAL HISTORY: Stroke follow-up. STUDY: CT examination of the head was performed without the administration of intravenous contrast. Sagittal and coronal reformations  were rendered from axial source images, and all images were reviewed in bone and soft tissue windows. CT DOSE:  One or more dose reduction techniques (e.g. automated exposure control, adjustment of the mA and/or kV according to patient size, use of iterative reconstruction technique) utilized for this examination. COMPARISON: CT head dated 6/27/2023. COMMENT: Brain parenchyma demonstrates maintenance of gray-white matter differentiation.  No acute intracranial parenchymal hemorrhage, mass effect, midline shift, or extra-axial fluid collection.  No large vascular territorial infarct.  Ventricles, basal cisterns and cortical sulci are prominent, consistent with involutional changes.  Hypodensity in the periventricular and subcortical white matter, consistent with microangiopathic changes. Atherosclerotic vascular calcifications are noted. Visualized paranasal sinuses and mastoid air cells are clear bilaterally. The calvarium is unremarkable. Partially visualized orbits are within normal limits. Extracranial soft tissues are normal.     IMPRESSION: No acute intracranial hemorrhage, large vascular territorial infarct, or mass effect.     CT HEAD WITHOUT IV CONTRAST    Result Date: 6/27/2023  CLINICAL HISTORY: vertigo, headache, unsteady on feet Comparison: No relevant prior studies     IMPRESSION: No acute intracranial abnormality.  COMMENT: Noncontrast CT of the head was performed.  One or more dose reduction techniques (e.g., Automated exposure control, adjustment of the mA and/or kV according to the patient size, use of iterative reconstruction technique) utilized for this examination. There is prominence of the ventricles and sulci compatible with age appropriate volume loss.  Mild patchy periventricular and subcortical white matter hypoattenuation identified, nonspecific but likely sequela of mild chronic microangiopathy.  There is no evidence of acute intracranial hemorrhage, large acute territorial infarct, extra-axial collection, or mass-effect.  Ventricles are midline without evidence of hydrocephalus. Partially empty sella noted. Visualized paranasal sinuses and mastoid air cells are clear. Intracranial atherosclerotic calcifications noted. No calvarial fracture or sizable scalp hematoma.    X-RAY CHEST 1 VIEW    Result Date: 6/27/2023  CLINICAL HISTORY: vertigo Comparison: Chest x-ray dated 11/09/05.     IMPRESSION: No active disease in the chest. COMMENT: Tubes/Lines: None. Lungs/Pleura: Mild bibasilar scarring and/or subsegmental atelectasis without consolidation, sizable pleural effusion, or pneumothorax in this semierect AP portable chest x-ray. Cardiomediastinal silhouette: Cardiomegaly and slightly tortuous calcified thoracic aorta as well as mitral annular calcification noted. Regional Soft Tissue/Osseous Structures: Mild scoliotic curvature.         ASSESSMENT AND PLAN      Gastroesophageal reflux disease without esophagitis  Assessment & Plan  C/w PPI     SVT (supraventricular tachycardia) (CMS/HCC)  Assessment & Plan  S/p ablation 2006  In NSR     Coronary artery disease involving native coronary artery of native heart without angina pectoris  Assessment & Plan  Coronary CT angiogram done [facility] June 2021 mild nonobstructive disease aortic root was 4.1 on that study   As outpt not on therapy cannot  tolerate aspirin or statins no symptoms of angina  Started on ASA for stroke prevention  Pt follows with [facility]. Willard [facility] at [facility]     Essential hypertension  Assessment & Plan  Currently not on any meds and BP stable     Mixed hyperlipidemia  Assessment & Plan  Per pt did not tolerate meds as outpt  Will monitor off medication     * Vertigo  Assessment & Plan  Suspect BPV but need to r/o CVA  Pt ademently declining MRI brain stating she is willing to get it as outpt in open MRI- explained reason why pt needs MRI and she knows and undeerstands the importance   carotid US done pending read  ASA   PT/OT: cleared for home  Meclizine prn         VTE Assessment: [place]    VTE Prophylaxis:  Current anticoagulants:    •None      Code Status: Full Code      Estimated Discharge Date: 6/28/2023       Disposition Planning: medically stable for discharge, daughter works late may not be able to pick her up     Varsha Squires MD  6/29/2023

## 2023-06-29 NOTE — PLAN OF CARE
Care Coordination Discharge Plan Summary    Admission Assessment Summary    General Information  Readmission Within the last 30 days: no previous admission in last 30 days  Does patient have a :    Patient-Specific Goals (include timeframe): return home w/ family and resume OP    Living Arrangements  Arrived From: home  Current Living Arrangements: home  People in Home: child(monica), adult, other (see comments) (son in law)  Home Accessibility: not wheelchair accessible, ramp to enter home, stairs within home (Group)  Living Arrangement Comments: 2SH, 5 MEG with L rail going up, full bath 1st fl and B&B 2nd fl    Social Determinants of Health - Screenings  Housing Stability  In the last 12 months, was there a time when you were not able to pay the mortgage or rent on time?: No  In the last 12 months, how many places have you lived?: 1  In the last 12 months, was there a time when you did not have a steady place to sleep or slept in a shelter (including now)?: No  Financial Resource Strain  How hard is it for you to pay for the very basics like food, housing, medical care, and heating?: Not hard at all  Transportation Needs  In the past 12 months, has lack of transportation kept you from medical appointments or from getting medications?: No  In the past 12 months, has lack of transportation kept you from meetings, work, or from getting things needed for daily living?: No    Functional Status Prior to Admission  Assistive Device/Animal Currently Used at Home: cane, straight, walker, front-wheeled, shower chair  Functional Status Comments: independent with DME, family does driving  IADL Comments: independent with DME    Discharge Needs Assessment    Concerns to be Addressed: care coordination/care conferences, discharge planning  Current Discharge Risk: other (see comments) (age)  Anticipated Changes Related to Illness: none    Discharge Plan Summary    Patient Choice  Offered/Gave Vendor List:  "yes  Patient's Choice of Community Agency(s): Patient is active with OP PT at [facility] in [place], [place] where she wishes to resume services post-discharge.  Patient and/or patient guardian/advocate was made aware of their right to choose a provider. A list of eligible providers was presented and reviewed with the patient and/or patient guardian/advocate in written and/or verbal form. The list delineates providers in the patient’s desired geographic area who are participating in the Medicare program and/or providers contracted with the patient’s primary insurance. The Medicare list and quality ratings were obtained from the Medicare.gov [medicare.gov] website.    Discharge Plan:  Disposition/Destination: Home  /         Connection to Community  Not applicable    Community Resources:      Discharge Transportation:  Is Out of Hospital DNR needed at Discharge: no  Does patient need discharge transport? No        DCP:  Patient to return home to family post-discharge.  She will also resume OP PT at [facility].  MD to provide OP script.  Family to transport when med cleared.     Addendum:  Pt and dtr Candace spoken to over the phone to discuss patient's discharge plan.  Patient disclosed to the OT Rep feeling week and wanting to discharge to SNF.  However, patient in \"observation\" status and does not meet the qualifications for SNF.  Nor is the family in position to privately pay for rehab.  Dtr is able to take the patient home in the AM with HHC ([facility]) and supervise patient for the next 5 days.  Referral sent to [facility] for RN & PT services.  Patient to likely d/c tomorrow.  Dtr to pickup back to home.       "

## 2023-06-29 NOTE — PROGRESS NOTES
Occupational Therapy -  Daily Treatment/Progress Note     Patient: Kathy Clayton  Location: [facility] Clinical Decision Unit G107  MRN: [id number]  Today's date: 6/29/2023    HISTORY OF PRESENT ILLNESS     Kathy is a 89 y.o. female admitted on 6/27/2023 with Vertigo [R42]  Acute cerebrovascular insufficiency [I67.81]. Principal problem is Vertigo.    Past Medical History  Kathy has a past medical history of Abnormal ECG, Coronary artery disease, COVID-19 (December / January), Dilated aortic root (CMS/HCC), Hypertension, Lipid disorder, Mitral regurgitation, and Pulmonary nodules (2012).    History of Present Illness   Kathy Clayton is a 89 y.o. female with a past medical history of coronary artery disease not on therapy due to intolerance, history of SVT status post ablation, coronary artery disease not on aspirin or statin due to intolerance, hyperlipidemia who presents with dizziness starting in the morning associated with headache.      PRIOR LEVEL OF FUNCTION AND LIVING ENVIRONMENT     Prior Level of Function    Flowsheet Row Most Recent Value   Dominant Hand right   Ambulation assistive equipment   Transferring assistive equipment   Toileting independent   Bathing independent   Dressing independent   Eating independent   Prior Level of Function Comment IND w/ ADLs, use of rollator   Assistive Device Currently Used at Home cane, straight, walker, front-wheeled, shower chair        Prior Living Environment    Flowsheet Row Most Recent Value   People in Home child(monica), adult, other (see comments)  [son in law]   Current Living Arrangements home   Home Accessibility not wheelchair accessible, ramp to enter home, stairs within home (Group)   Living Environment Comment Pt lives with dtr and [employer] PABLO in 2SH, 5 MEG, Full bath with tub shower avail on 1F, FF 2 F bed and WIS.        Occupational Profile    Flowsheet Row Most Recent Value   Environmental Supports and Barriers Supportive dtr who works  during the day, but [employer] avail        VITALS AND PAIN     OT Vitals    Date/Time Pulse HR Source SpO2 Pt Activity O2 Therapy BP BP Location BP Method Pt Position [facility]   06/29/23 1422 72 Monitor 95 % At rest None (Room air) 117/70 Right upper arm Automatic Sitting SD   06/29/23 1427 -- -- -- -- -- 117/69 Right upper arm Automatic Standing SD   06/29/23 1432 -- -- -- -- -- 124/73 Right upper arm Automatic Sitting SD   06/29/23 1443 76 Monitor 96 % At rest None (Room air) 121/70 Right upper arm Automatic Sitting SD      OT Pain    Date/Time Pain Type Rating: Rest Rating: Activity [facility]   06/29/23 1423 Pain Assessment;Pain Reassessment 0 - no pain 0 - no pain VV        Objective   OBJECTIVE     Start time:  1421  End time:  1450  Session Length: 29 min  Mode of Treatment: occupational therapy, co-treatment (Skilled overlap with PT for clarification of dispo recs for poss d/c today)    General Observations  Patient received upright, in chair. She was agreeable to therapy, no issues or concerns identified by nurse prior to session.      Precautions: fall              OT Eval and Treat - 06/29/23 1421        Cognition    Orientation Status oriented x 3     Affect/Mental Status WFL     Follows Commands follows one-step commands     Cognitive Function WFL     Comment, Cognition Pleasant and cooperative t/o session        Bed Mobility    Comment r'cved OOB on arrival        Mobility Belt    Mobility Belt Used for All Out of Bed Activity yes        Sit/Stand Transfer    Surface chair with arm rests     [place] close supervision     Safety/Cues increased time to complete     Assistive Device walker, front-wheeled     Transfer Comments To/from recliner. CLS, no cues required for safe hand placement, but slow and effortful rise. Reports feeling as if she is going to loss her balance posteriorly - required min steadying (A) upon standing and incr time to swtich from armrests to RW. C/o persisten lightheadedness despite BP stable  and no drop noted. See vitals for details.        Toilet Transfer    Transfer Technique sit/stand     [place] close supervision     Safety/Cues increased time to complete     Assistive Device grab bars/safety frame;walker, front-wheeled     Comment to/from standard height toilet. CLS + incr time and effort. Pt able to use R side GB and reach back for toilet seat for safe controlled descent. INcr effort for push to stand with same hand placement.        Functional Mobility    Distance hallway     Functional Mobility [place] close supervision     Safety/Cues increased time to complete     Assistive Device walker, front-wheeled     Functional Mobility Comments CLS for func mobility w/ RW. No overt LOB noted. Slow and effortful. Chair follow for safety. C/o persistent lighteheadedness t/o but no worsening and no drop in BP noted. C/o BLE weakness and feeling far from her baseline        Grooming    Tasks washes, rinses and dries hands     [place] close supervision     Position unsupported standing     Comment CLS for safe hand grooming tasks , cues for safe RW management. Fair standing tolerance/balance        Toileting    Tasks perform bladder hygiene;adjust/manage clothing     [place] close supervision;minimum assist (75% or more patient effort)     Position supported standing     Comment Set-up for toilet paper, MIN Ax1 for clothing management, CLS for standing marylou hygiene following voiding        Balance    Static Sitting Balance WFL;sitting in chair     Dynamic Sitting Balance WFL;sitting in chair     Sit to Stand Dynamic Balance mild impairment;supported     Static Standing Balance mild impairment;supported     Dynamic Standing Balance mild impairment;supported     Comment, Balance CLS for safe OOB activity w/ RW + chair follow for safety. Incr time and effortful t/o session. No overt LOB noted. C/o BLE weakness, persistent lightheadedness without drop or change in BP. Grossly deconditioned         Impairments/Safety Issues    Impairments Affecting Function balance;endurance/activity tolerance;strength                                  Session Outcome  Patient reclined, in chair, leg(s) elevated at end of session, chair alarm on, all needs met, call light in reach, personal items in reach. Nursing notified about patient's performance, patient's position, and patient's response to therapy/activity.    AM-PAC™ - ADL (Current Function)     Putting on/taking off regular lower body clothing 2 - A Lot   Bathing 2 - A Lot   Toileting 3 - A Little   Putting on/taking off regular upper body clothing 3 - A Little   Help for taking care of personal grooming 3 - A Little   Eating meals 4 - None   AM-PAC™ ADL Score 17      ASSESSMENT AND PLAN     Progress Summary  OT tx session completed. Pt required CLS + incr time + effort for STS, func mobility w/ RW, toilet transfer, and toileting/grooming tasks. MIN A for clothing management during toileting. Chair follow for safety OOB. No overt LOB noted. Limited by persistent c/o lightheadedness despite stable BP and c/o BLE weakness. Pt gross deconditioning - limited activity tolerance, balance, and strength. OT to cont w/ POC in acute setting. Cont rec d/c home w/ (A) for all ADLs and func mobiltiy w/ RW + HH OT. If unable to have necessary (A) at home rec d/c SNF for cont skilled services and return to OF    Patient/Family Therapy Goal Statement: To return home and resume outpt vs HH    OT Plan    Flowsheet Row Most Recent Value   Rehab Potential good, to achieve stated therapy goals at 06/28/2023 0911   Therapy Frequency 3 times/wk at 06/28/2023 0911   Planned Therapy Interventions activity tolerance training, adaptive equipment training, BADL retraining, functional balance retraining, occupation/activity based interventions, patient/caregiver education/training, transfer/mobility retraining at 06/28/2023 0911          OT Discharge Recommendations    Flowsheet Row Most  Recent Value   OT Recommended Discharge Disposition home with assistance, home with home health  [(A) for all ADLs/mob , if not SNF] at 06/29/2023 1421   Anticipated Equipment Needs At Discharge (OT) dressing equipment, shower chair, walker, front-wheeled at 06/28/2023 0911               OT Goals    Flowsheet Row Most Recent Value   Bed Mobility Goal 1    Activity/Assistive Device bed mobility activities, all at 06/28/2023 0911   [place] modified independence at 06/28/2023 0911   Time Frame by discharge at 06/28/2023 0911   Progress/Outcome goal ongoing at 06/28/2023 0911   Transfer Goal 1    Activity/Assistive Device sit-to-stand/stand-to-sit, toilet at 06/28/2023 0911   [place] supervision required at 06/28/2023 0911   Time Frame by discharge at 06/28/2023 0911   Progress/Outcome goal ongoing at 06/28/2023 0911   Dressing Goal 1    Activity/Adaptive Equipment lower body dressing at 06/28/2023 0911   [place] set-up required, supervision required at 06/28/2023 0911   Time Frame by discharge at 06/28/2023 0911   Progress/Outcome goal ongoing at 06/28/2023 0911   Toileting Goal 1    Activity/Assistive Device toileting skills, all at 06/28/2023 0911   [place] set-up required, supervision required at 06/28/2023 0911   Time Frame by discharge at 06/28/2023 0911   Progress/Outcome goal ongoing at 06/28/2023 0911

## 2023-06-29 NOTE — PLAN OF CARE
Care Coordination Discharge Plan Note     Discharge Needs Assessment  Concerns to be Addressed: care coordination/care conferences, discharge planning  Current Discharge Risk: dependent with mobility/activities of daily living, other (see comments) (Age)    Anticipated Discharge Plan  Anticipated Discharge Disposition: home with home health  Type of Home Care Services: nursing, home PT        Patient Choice  Offered/Gave Vendor List: yes  Patient's Choice of Community Agency(s): Patient family open to [facility]    Patient and/or patient guardian/advocate was made aware of their right to choose a provider. A list of eligible providers was presented and reviewed with the patient and/or patient guardian/advocate in written and/or verbal form. The list delineates providers in the patient’s desired geographic area who are participating in the Medicare program and/or providers contracted with the patient’s primary insurance. The Medicare list and quality ratings were obtained from the Medicare.gov [medicare.gov] website.    Discharge Barriers   Barriers to Discharge: None  ---------------------------------------------------------------------------------------------------------------------    Interdisciplinary Discharge Plan Review:  Participants:social work/services, nursing    Concerns Comments:      Discharge Plan:   Disposition/Destination: Home Health Care - [facility] / Home  Discharge Facility:    Community Resources:      Discharge Transportation:  Is Out of Hospital DNR needed at Discharge: no  Does patient need discharge transport? No

## 2023-06-30 VITALS
DIASTOLIC BLOOD PRESSURE: 75 MMHG | HEIGHT: 64 IN | WEIGHT: 147.8 LBS | TEMPERATURE: 97.4 F | HEART RATE: 67 BPM | BODY MASS INDEX: 25.23 KG/M2 | RESPIRATION RATE: 16 BRPM | OXYGEN SATURATION: 94 % | SYSTOLIC BLOOD PRESSURE: 139 MMHG

## 2023-06-30 PROCEDURE — 99239 HOSP IP/OBS DSCHRG MGMT >30: CPT | Performed by: HOSPITALIST

## 2023-06-30 PROCEDURE — 63700000 HC SELF-ADMINISTRABLE DRUG: Performed by: HOSPITALIST

## 2023-06-30 PROCEDURE — 12000000 HC ROOM AND CARE MED/SURG

## 2023-06-30 PROCEDURE — G0378 HOSPITAL OBSERVATION PER HR: HCPCS

## 2023-06-30 RX ADMIN — ACETAMINOPHEN 650 MG: 650 SOLUTION ORAL at 12:03

## 2023-06-30 RX ADMIN — PANTOPRAZOLE SODIUM 40 MG: 40 TABLET, DELAYED RELEASE ORAL at 08:29

## 2023-06-30 RX ADMIN — MECLIZINE HYDROCHLORIDE 25 MG: 25 TABLET ORAL at 08:29

## 2023-06-30 RX ADMIN — ASPIRIN 81 MG 81 MG: 81 TABLET ORAL at 08:29

## 2023-06-30 ASSESSMENT — COGNITIVE AND FUNCTIONAL STATUS - GENERAL
MOVING TO AND FROM BED TO CHAIR: 3 - A LITTLE
STANDING UP FROM CHAIR USING ARMS: 3 - A LITTLE
CLIMB 3 TO 5 STEPS WITH RAILING: 2 - A LOT
WALKING IN HOSPITAL ROOM: 3 - A LITTLE

## 2023-06-30 NOTE — PLAN OF CARE
Care Coordination Discharge Plan Summary    Admission Assessment Summary    General Information  Readmission Within the last 30 days: no previous admission in last 30 days  Does patient have a :    Patient-Specific Goals (include timeframe): return home w/ family and resume OP    Living Arrangements  Arrived From: home  Current Living Arrangements: home  People in Home: child(monica), adult, other (see comments) (son in law)  Home Accessibility: not wheelchair accessible, ramp to enter home, stairs within home (Group)  Living Arrangement Comments: 2SH, 5 MEG with L rail going up, full bath 1st fl and B&B 2nd fl    Social Determinants of Health - Screenings  Housing Stability  In the last 12 months, was there a time when you were not able to pay the mortgage or rent on time?: No  In the last 12 months, how many places have you lived?: 1  In the last 12 months, was there a time when you did not have a steady place to sleep or slept in a shelter (including now)?: No  Financial Resource Strain  How hard is it for you to pay for the very basics like food, housing, medical care, and heating?: Not hard at all  Transportation Needs  In the past 12 months, has lack of transportation kept you from medical appointments or from getting medications?: No  In the past 12 months, has lack of transportation kept you from meetings, work, or from getting things needed for daily living?: No    Functional Status Prior to Admission  Assistive Device/Animal Currently Used at Home: cane, straight, walker, front-wheeled, shower chair  Functional Status Comments: independent with DME, family does driving  IADL Comments: independent with DME    Discharge Needs Assessment    Concerns to be Addressed: care coordination/care conferences, discharge planning  Current Discharge Risk: dependent with mobility/activities of daily living, other (see comments) (Age)  Anticipated Changes Related to Illness: inability to care for self, other  (see comments) (Needs close supervision)    Discharge Plan Summary    Patient Choice  Offered/Gave Vendor List: yes  Patient's Choice of Community Agency(s): Patient family open to [facility]  Patient and/or patient guardian/advocate was made aware of their right to choose a provider. A list of eligible providers was presented and reviewed with the patient and/or patient guardian/advocate in written and/or verbal form. The list delineates providers in the patient’s desired geographic area who are participating in the Medicare program and/or providers contracted with the patient’s primary insurance. The Medicare list and quality ratings were obtained from the Medicare.gov [medicare.gov] website.    Discharge Plan:  Disposition/Destination: Home  / Home       Connection to Community  Not applicable    Community Resources:      Discharge Transportation:  Is Out of Hospital DNR needed at Discharge: no  Does patient need discharge transport? No         SW met w/ [facility] and [facility] confirmed they are following on d/c.    DCP: home w/ [facility]  [facility] referral in and following

## 2023-06-30 NOTE — PLAN OF CARE
Problem: Adult Inpatient Plan of Care  Goal: Plan of Care Review  Outcome: Progressing  Flowsheets (Taken 6/30/2023 8985)  Progress: improving  Outcome Evaluation: VSS. Denies dizziness overnight. OOB with assist x1. Possible D/C today  Plan of Care Reviewed With: patient   Plan of Care Review  Plan of Care Reviewed With: patient  Progress: improving  Outcome Evaluation: VSS. Denies dizziness overnight. OOB with assist x1. Possible D/C today

## 2023-06-30 NOTE — UM PHYSICIAN REVIEW NOTE
Post discharge review.    Patient admitted 6/27 with vertigo, improved and dc order placed on 6/28.  Daughter unable to pick her up.  Patient remained in house until 6/30, had intermittent dizziness but ultimately deemed to be medically stable for dc.    Discussed case with [facility]alus.  Outpatient services were appropriate given medical stability for dc on 6/28 and potential delay in discharge due to pickup.    Millie Grewal, DO

## 2023-06-30 NOTE — NURSING NOTE
Discharge order placed. Discharge instructions reviewed with pt and all questions answered. Pt discharged home via private vehicle.

## 2023-07-01 NOTE — PROGRESS NOTES
Hospital Medicine Service -  Daily Progress Note       SUBJECTIVE   Interval History:   Dizziness still present  Cleared by PT for home     OBJECTIVE      Vital signs in last 24 hours:     No intake or output data in the 24 hours ending 07/01/23 1220    PHYSICAL EXAMINATION      Physical Exam  GENERAL APPEARANCE  Awake, alert    MUCUS MEMBRANES  Moist    LUNGS  CTAB, no w/r/r    CHEST  RRR, no m/g/r    ABDOMEN  Soft, NT, ND, +BS    EXTREMITIES  No c/c/e    SKIN  No obvious rash    HEENT  Anicteric    NEURO  Moving all extremities, Ox3, coherent, no dysarthria           LINES, CATHETERS, DRAINS, AIRWAYS, AND WOUNDS   Lines, Drains, and Airways:  Wounds (agree with documentation and present on admission):  External Urinary Catheter Female (one size) (Active)   Number of days: 2         Comments:      LABS / IMAGING / TELE      Labs  Results from last 7 days   Lab Units 06/28/23  0324 06/27/23  1624   WBC K/uL 5.63 6.37   HEMOGLOBIN g/dL 11.9 11.5*   HEMATOCRIT % 36.4 36.1   PLATELETS K/uL 189 199    Results from last 7 days   Lab Units 06/28/23  0324 06/27/23  1624   SODIUM mEQ/L 140 138   POTASSIUM mEQ/L 3.8 4.2   CHLORIDE mEQ/L 103 103   CO2 mEQ/L 30 30   BUN mg/dL 19 25   CREATININE mg/dL 0.7 0.8   GLUCOSE mg/dL 95 91                                          Microbiology Results     ** No results found for the last 720 hours. **               No results found for: COVID19    Imaging  Ultrasound carotid bilateral    Result Date: 6/29/2023  Procedure: The extracranial cervical portions of the carotid and vertebral arteries were analyzed utilizing B mode, imaging with grey scale, color flow and spectral analysis of the doppler pulse curves. Right The extracranial carotid vessels are defined. There is no obstruction on transverse imaging. Non elevated flow velocities are seen in the common and internal carotid arteries. Vertebral artery flow is antegrade. Left The extracranial carotid vessels are defined. There  is no obstruction on transverse imaging. Non elevated flow velocities are seen in the common and internal carotid arteries. Vertebral artery flow is antegrade. Impression: No hemodynamically significant stenosis identified in the visualized carotid circulation.     CT HEAD WITHOUT IV CONTRAST    Result Date: 6/28/2023  CLINICAL HISTORY: Stroke follow-up. STUDY: CT examination of the head was performed without the administration of intravenous contrast. Sagittal and coronal reformations  were rendered from axial source images, and all images were reviewed in bone and soft tissue windows. CT DOSE:  One or more dose reduction techniques (e.g. automated exposure control, adjustment of the mA and/or kV according to patient size, use of iterative reconstruction technique) utilized for this examination. COMPARISON: CT head dated 6/27/2023. COMMENT: Brain parenchyma demonstrates maintenance of gray-white matter differentiation.  No acute intracranial parenchymal hemorrhage, mass effect, midline shift, or extra-axial fluid collection.  No large vascular territorial infarct.  Ventricles, basal cisterns and cortical sulci are prominent, consistent with involutional changes.  Hypodensity in the periventricular and subcortical white matter, consistent with microangiopathic changes. Atherosclerotic vascular calcifications are noted. Visualized paranasal sinuses and mastoid air cells are clear bilaterally. The calvarium is unremarkable. Partially visualized orbits are within normal limits. Extracranial soft tissues are normal.     IMPRESSION: No acute intracranial hemorrhage, large vascular territorial infarct, or mass effect.     CT HEAD WITHOUT IV CONTRAST    Result Date: 6/27/2023  CLINICAL HISTORY: vertigo, headache, unsteady on feet Comparison: No relevant prior studies     IMPRESSION: No acute intracranial abnormality. COMMENT: Noncontrast CT of the head was performed.  One or more dose reduction techniques (e.g., Automated  exposure control, adjustment of the mA and/or kV according to the patient size, use of iterative reconstruction technique) utilized for this examination. There is prominence of the ventricles and sulci compatible with age appropriate volume loss.  Mild patchy periventricular and subcortical white matter hypoattenuation identified, nonspecific but likely sequela of mild chronic microangiopathy.  There is no evidence of acute intracranial hemorrhage, large acute territorial infarct, extra-axial collection, or mass-effect.  Ventricles are midline without evidence of hydrocephalus. Partially empty sella noted. Visualized paranasal sinuses and mastoid air cells are clear. Intracranial atherosclerotic calcifications noted. No calvarial fracture or sizable scalp hematoma.    X-RAY CHEST 1 VIEW    Result Date: 6/27/2023  CLINICAL HISTORY: vertigo Comparison: Chest x-ray dated 11/09/05.     IMPRESSION: No active disease in the chest. COMMENT: Tubes/Lines: None. Lungs/Pleura: Mild bibasilar scarring and/or subsegmental atelectasis without consolidation, sizable pleural effusion, or pneumothorax in this semierect AP portable chest x-ray. Cardiomediastinal silhouette: Cardiomegaly and slightly tortuous calcified thoracic aorta as well as mitral annular calcification noted. Regional Soft Tissue/Osseous Structures: Mild scoliotic curvature.         ASSESSMENT AND PLAN      Gastroesophageal reflux disease without esophagitis  Assessment & Plan  C/w PPI     SVT (supraventricular tachycardia) (CMS/HCC)  Assessment & Plan  S/p ablation 2006  In NSR     Coronary artery disease involving native coronary artery of native heart without angina pectoris  Assessment & Plan  Coronary CT angiogram done [facility] June 2021 mild nonobstructive disease aortic root was 4.1 on that study   As outpt not on therapy cannot tolerate aspirin or statins no symptoms of angina  Started on ASA for stroke prevention  [place]   [facility] [place] [facility]     Essential hypertension  Assessment & Plan  Currently not on any meds and BP stable     Mixed hyperlipidemia  Assessment & Plan  Per pt did not tolerate meds as outpt  Will monitor off medication     * Vertigo  Assessment & Plan  Suspect BPV but need to r/o CVA  Pt ademently declining MRI brain stating she is willing to get it as outpt in open MRI- explained reason why pt needs MRI and she knows and undeerstands the importance   carotid US done pending read  ASA   PT/OT: cleared for home  Meclizine prn       VTE Assessment: Padua    VTE Prophylaxis:  Current anticoagulants:    •None      Code Status: Prior      Estimated Discharge Date: 6/30/2023       Disposition Planning: medically stable for discharge     Varsha Squires MD

## 2023-07-06 NOTE — UM PHYSICIAN REVIEW NOTE
Utilization Secondary Review Note      Patient Name: Kathy Clayton      MRN: [id number]  Insurance: MEDICARE  Admission date: 6/27/2023  Initial order:    Inpatient  Planned admission: No  Post Discharge Review: Yes            Outpatient services are appropriate for this 89 y.o. female who presented with vertigo. Patient was stable for discharge after 1 midnight, however daughter was unable to pick her up.     Ania Gross,   7/6/2023

## 2023-08-01 LAB
CHOLEST SERPL-MCNC: 209 MG/DL
HDLC SERPL-MCNC: 61 MG/DL
HDLC SERPL: 3.4 {RATIO}
LDLC SERPL CALC-MCNC: 124 MG/DL
NONHDLC SERPL-MCNC: 148 MG/DL
TRIGL SERPL-MCNC: 122 MG/DL

## 2024-02-21 ENCOUNTER — APPOINTMENT (RX ONLY)
Dept: URBAN - METROPOLITAN AREA CLINIC 374 | Facility: CLINIC | Age: 89
Setting detail: DERMATOLOGY
End: 2024-02-21

## 2024-02-21 DIAGNOSIS — L21.8 OTHER SEBORRHEIC DERMATITIS: ICD-10-CM | Status: UNCHANGED

## 2024-02-21 DIAGNOSIS — D485 NEOPLASM OF UNCERTAIN BEHAVIOR OF SKIN: ICD-10-CM

## 2024-02-21 DIAGNOSIS — L30.8 OTHER SPECIFIED DERMATITIS: ICD-10-CM | Status: INADEQUATELY CONTROLLED

## 2024-02-21 PROBLEM — D48.5 NEOPLASM OF UNCERTAIN BEHAVIOR OF SKIN: Status: ACTIVE | Noted: 2024-02-21

## 2024-02-21 PROCEDURE — ? PRESCRIPTION MEDICATION MANAGEMENT

## 2024-02-21 PROCEDURE — ? PRESCRIPTION

## 2024-02-21 PROCEDURE — 99213 OFFICE O/P EST LOW 20 MIN: CPT

## 2024-02-21 PROCEDURE — ? RECOMMENDATIONS

## 2024-02-21 PROCEDURE — ? PHOTO-DOCUMENTATION

## 2024-02-21 RX ORDER — KETOCONAZOLE 20 MG/G
CREAM TOPICAL BID
Qty: 60 | Refills: 1 | Status: ERX | COMMUNITY
Start: 2024-02-21

## 2024-02-21 RX ORDER — TRIAMCINOLONE ACETONIDE 1 MG/G
CREAM TOPICAL BID
Qty: 80 | Refills: 2 | Status: ERX | COMMUNITY
Start: 2024-02-21

## 2024-02-21 RX ADMIN — KETOCONAZOLE: 20 CREAM TOPICAL at 00:00

## 2024-02-21 RX ADMIN — TRIAMCINOLONE ACETONIDE: 1 CREAM TOPICAL at 00:00

## 2024-02-21 ASSESSMENT — LOCATION DETAILED DESCRIPTION DERM
LOCATION DETAILED: SUPERIOR THORACIC SPINE
LOCATION DETAILED: RIGHT MEDIAL SCLERA
LOCATION DETAILED: RIGHT INFERIOR CENTRAL MALAR CHEEK

## 2024-02-21 ASSESSMENT — LOCATION ZONE DERM
LOCATION ZONE: TRUNK
LOCATION ZONE: CONJUNCTIVA
LOCATION ZONE: FACE

## 2024-02-21 ASSESSMENT — LOCATION SIMPLE DESCRIPTION DERM
LOCATION SIMPLE: RIGHT EYE
LOCATION SIMPLE: UPPER BACK
LOCATION SIMPLE: RIGHT CHEEK

## 2024-02-21 NOTE — PROCEDURE: PRESCRIPTION MEDICATION MANAGEMENT
Detail Level: Zone
Render In Strict Bullet Format?: No
Discontinue Regimen: Halobetasol
Initiate Treatment: TAC 0.1% cream apply a thin layer QDAY to AA of back
Discontinue Regimen: Hydrocortisone
Initiate Treatment: Ketoconazole 2 % topical cream Apply a thin layer BID to rash of face until cleared then use PRN flare

## 2024-03-19 NOTE — PROGRESS NOTES
Cardiology Note    Arpna Casillas DO Lillian E Forrest is a 89 y.o. female 5/24/1933     She is here today with her daughter  She returns for follow-up and echocardiogram  She is followed for mildly dilated aortic root, nonobstructive CAD, mitral valve prolapse remote history of SVT with ablation, mixed hyperlipidemia      She has dilated aortic root 4.2 cm seen on coronary CTA 2021 stable from 2018  echo today April 2024 shows  Mitral valve prolapse with moderate mitral regurgitation  Echocardiogram today 2020 4 aortic root stable 4.2 cm is developed mild aortic stenosis mean gradient of 8 preserved ejection fraction  She has nonobstructive CAD diagnosed by coronary CT angiogram at [facility] in 2021  Remote history of SVT with ablation 2006  Mixed hyperlipidemia does not tolerate any oral medications for this and declines injectable medication she has multiple drug intolerances      Lab work March 2024  Potassium 4.3 creatinine 0.83  Cholesterol 210 LDL cholesterol 122 HDL 56  proBNP mildly elevated 900 appropriate for age  Hemoglobin 11.9  Thyroids normal                    Lab work   March 2024  Glucose 95, K 4.3  BUN 24, Cr 0.83  A1C 5.6  HDL 56,     CBC normal  TSH 3.24  Vit B12 4106    April 2023  Creatinine 0.91 potassium 4.4  Cholesterol 215 triglycerides 95 LDL cholesterol 135  CBC normal           Patient Active Problem List    Diagnosis Date Noted    Vertigo 06/27/2023    Paresthesias 10/14/2019    Gastroesophageal reflux disease without esophagitis 05/16/2019    Arthritis 04/09/2018    Mixed hyperlipidemia 04/01/2018    Essential hypertension 04/01/2018    Coronary artery disease involving native coronary artery of native heart without angina pectoris 04/01/2018    Dilated aortic root (CMS/HCC) 04/01/2018    SVT (supraventricular tachycardia) (CMS/HCC) 04/01/2018    Pulmonary nodule 04/01/2018    RBBB 04/01/2018    MVP (mitral valve prolapse) 04/01/2018        Allergy  Adenosine, Anesthesia s/i-40  [propofol], Cephalosporins, Desvenlafaxine succinate, Dexbrompheniramine, Diltiazem, Escitalopram oxalate, Ezetimibe, Fluoxetine hcl, Lactase, Metoprolol, Mirtazapine, Penicillins, Plant stanol leonila, Sertraline hcl, Statins-hmg-coa reductase inhibitors, and Sulfa (sulfonamide antibiotics)      MED LIST  Current Outpatient Medications   Medication Sig Dispense Refill    acetaminophen (TYLENOL EXTRA STRENGTH ORAL) Take 500 mg by mouth daily.      aspirin 81 mg chewable tablet Take 1 tablet (81 mg total) by mouth daily. 30 tablet 0    carboxymethylcellulose (REFRESH PLUS) 0.5 % dropperette Administer 1 drop into both eyes 3 (three) times a day as needed for dry eyes.      cholecalciferol, vitamin D3, 1,000 unit (25 mcg) tablet Take 1 tablet by mouth daily.      clobetasoL (TEMOVATE) 0.05 % ointment Apply 1 application topically 2 (two) times a week (Mon, Thu).      CYANOCOBALAMIN/FOLIC ACID (VITAMIN B12-FOLIC ACID) 500-400 mcg tablet Take 1 tablet by mouth daily.      diclofenac sodium 1.5 % drops Apply topically.      flaxseed oil 1,000 mg capsule Take 1 capsule by mouth as needed.        folic acid (FOLVITE) 400 mcg tablet Take 1 tablet by mouth daily.      hydrocortisone 2.5 % cream APPLY A THIN LAYER TWICE A DAY TO RASH ON FACE UNTIL CLEAR THEN USE AS NEEDED FOR FLARE      lansoprazole (PREVACID SOLUTAB) 30 mg disintegrating tablet Take 30 mg by mouth daily.      LORazepam (ATIVAN) 0.5 mg tablet Take 0.5 mg by mouth nightly as needed for anxiety.      magnesium hydroxide (MILK OF MAGNESIA CONCENTRATED ORAL) Take by mouth once.      meclizine (ANTIVERT) 25 mg tablet Take 1 tablet (25 mg total) by mouth 3 (three) times a day as needed for dizziness. 15 tablet 0    multivit-min-FA-lycopen-lutein tablet Take 1 tablet by mouth daily.      potassium gluconate 595 mg (99 mg) tablet Take 1 tablet by mouth daily.      simethicone (MYLICON,GAS-X) 125 mg capsule Take 125 mg by  "mouth daily.      triamcinolone (KENALOG) 0.1 % cream APPLY A THIN LAYER TO AFFECTED AREA S) OF BACK ONCE DAILY      Lactobac no.41-Bifidobact no.7 70 mg (3 billion cell) capsule Take 1 capsule by mouth daily.      thiamine 50 mg tablet Take 50 mg by mouth daily.       No current facility-administered medications for this visit.        ROS          Lab Results   Component Value Date    WBC 5.63 06/28/2023    HGB 11.9 06/28/2023    HCT 36.4 06/28/2023     06/28/2023    CHOL 209 (H) 06/28/2023    TRIG 122 06/28/2023    HDL 61 06/28/2023    LDLCALC 124 (H) 06/28/2023     06/28/2023    K 3.8 06/28/2023     06/28/2023    CREATININE 0.7 06/28/2023    BUN 19 06/28/2023    CO2 30 06/28/2023    HGBA1C 5.6 03/19/2024       Lab Results   Component Value Date    GLUCOSE 95 06/28/2023    CALCIUM 8.9 06/28/2023     06/28/2023    K 3.8 06/28/2023    CO2 30 06/28/2023     06/28/2023    BUN 19 06/28/2023    CREATININE 0.7 06/28/2023       Objective   Vitals:    04/02/24 1601   BP: 130/80   BP Location: Left upper arm   Patient Position: Sitting   Weight: 66.7 kg (147 lb)   Height: 1.6 m (5' 3\")          Physical Exam  Constitutional:       General: She is not in acute distress.     Appearance: She is well-developed.   HENT:      Head: Normocephalic and atraumatic.      Nose: Nose normal.   Eyes:      General: No scleral icterus.     Conjunctiva/sclera: Conjunctivae normal.   Neck:      Vascular: No JVD.   Cardiovascular:      Rate and Rhythm: Normal rate and regular rhythm.      Pulses: Intact distal pulses.      Heart sounds: Normal heart sounds. No murmur heard.     No friction rub. No gallop.      Comments: 2/6 systolic murmur  Pulmonary:      Effort: Pulmonary effort is normal. No respiratory distress.      Breath sounds: No stridor. No wheezing or rales.   Chest:      Chest wall: No tenderness.   Abdominal:      Tenderness: There is no abdominal tenderness.   Musculoskeletal:         General: No " deformity.      Comments: Uses walker   Skin:     General: Skin is warm and dry.   Neurological:      Mental Status: She is alert and oriented to person, place, and time.       Imaging    CAT SCAN     CTA 3/17 AOR ROOT 5.0 PULM NODULES NO [id number]  CTA 3/17 aorga 3.9 lll pulm nodules stable from 201`12    CAT scan October 2018 aortic root 4  Thyroid nodule  Right upper lobe tubular nodule  No change 2016     Cat scan chest 9/19 rll infection? Small nodules aorta 4. u fo p    Cat scan head 5/19 small vessel disease    Cat scan dec 2020 4.2 pulm nodules stable felt to be bronchiolitis      STRESS  pharm nuc neg 9/16        ECHO    Echo April 2024   Left Ventricle: Normal ventricle size. Mild concentric left ventricular hypertrophy. Preserved systolic function. Estimated EF 60-65%. Wall motion appears grossly normal. Normal septal motion. Indeterminate diastolic filling pattern.    Aorta: Mild dilatation of the aortic root at 4.20cm. Mild dilatation of the ascending aorta at 4.30cm.  No significant change from study of 2018    Right Ventricle: Normal ventricle size. Normal systolic function.    Left Atrium: Moderately dilated atrium.    Right Atrium: Mildly dilated atrium.    Aortic Valve: Tricuspid valve.  Calcification present. Mild regurgitation. Mild stenosis. Peak velocity = 2.67 m/s. Mean gradient = 8.00 mmHg. Calculated area by cont eq = 0.94 cm2. Calculated dimensionless index = 0.42.    Mitral Valve: Moderate calcification of the posterior leaflet. Moderate mitral annular calcification. Mild to moderate regurgitation. Mild stenosis. Mean gradient = 3.00.    Tricuspid Valve: Structure is grossly normal. Moderate regurgitation. Estimated RVSP = 47 mmHg.    Pulmonic Valve: Grossly normal structure. Mild regurgitation.    IVC/SVC: Inferior vena cava is dilated (>2.1cm). Inferior vena cava collapses <50% during inspiration.    Pericardium: No evidence of pericardial effusion.    Compared to study of 2023 patient  has developed mild aortic stenosis    VASCULAR  Carotid 10/18 mild disease      CAT SCAN  Cat scan9/19 aorta 4 rll inflammation  CAT scan December 2020 1 aortic root 4.1 cm pulmonary nodule stable bronchiolitis coronary calcifications thyroid cysts    Cor cta 6/2021  Mild CAD [facility] aortic root 4.1 cm mac  CORONARY ANGIOGRAPHY:   RIGHT CORONARY ARTERY: Scattered calcified plaque with no significant stenosis. The conus branch arises separately from the right sinus of Valsalva, an anatomic variant.   The LAD and circumflex individually originate off the left sinus of Valsalva, an anatomic variant.   LEFT ANTERIOR DESCENDING: Scattered mixed calcified and noncalcified plaque with no significant stenosis.   LEFT CIRCUMFLEX: Scattered calcified plaque or significant stenosis. The SA gonzález artery arises from the circumflex coronary artery.     The coronary arteries are codominant.     FUNCTIONAL ANALYSIS   Functional analysis was not performed as this study was acquired using prospective triggering to reduce radiation dose.     ADDITIONAL FINDINGS:   *  Mild enlargement of the aortic root to 4.1 cm.   *  Dilated right atrium. Severe mitral annular calcifications. Calcifications of the aortic valve, root and descending thoracic aorta.   *  Bibasilar subsegmental atelectasis. Interval decrease in clustered micronodules in the anterior right upper lobe, in keeping with sequela from bronchiolitis (series 301, images 6-7).         ELECTROPHYSIOLOGY  SVT ablation 2006 April 2024 right bundle branch block no change  EKG  j right bundle branch block inferior T wave inversions no change            Assessment/Plan:  Dilated aortic root (CMS/HCC)  Echocardiogram today April 2024 stable 4.2 cm preserved ejection fraction mild aortic stenosis new from previous year mean gradient of 8 preserved ejection fraction MVP with moderate mitral regurgitation pulmonary artery pressure elevated at 47 stable  findings      Coronary artery disease involving native coronary artery of native heart without angina pectoris  Nonobstructive CAD coronary CTA in 2021    MVP (mitral valve prolapse)  Moderate mitral regurgitation with moderate pulmonary hypertension echo April 2024 declines diuretic therapy    SVT (supraventricular tachycardia) (CMS/HCC)  Ablation 2006    Mixed hyperlipidemia  LDL cholesterol 120 intolerant of multiple oral medication declines injectables       History of valvular disease extensive mitral annular calcification mild to moderate MR thoracic aneurysm with ex aneurysm stable since 2017 last measured measured today echo April 2024 stable 4.2 cm  She is developed mild aortic stenosis mean gradient 8 mild aortic insufficiency MVP moderate regurgitation preserved ejection fraction     she has nonobstructive CAD coronary CT angiogram June 2021  She has hyperlipidemia intolerant to all medications declines injectables  She is intolerant to multiple medications we talked about diuretic therapy for intermittent edema with her severe moderate regurgitation but she has side effects GI and declined.    She is basically on no cardiac medications   follow-up in 1 year with echocardiogram keeping an eye on her aortic root size and mild aortic stenosis      This letter was generated using speech recognition software.  Please excuse any typographical errors.        Matias Willard MD [facility]   4/2/2024  Arpan Sethi DO

## 2024-04-02 ENCOUNTER — OFFICE VISIT (OUTPATIENT)
Dept: CARDIOLOGY | Facility: CLINIC | Age: 89
End: 2024-04-02
Payer: MEDICARE

## 2024-04-02 ENCOUNTER — HOSPITAL ENCOUNTER (OUTPATIENT)
Dept: CARDIOLOGY | Facility: CLINIC | Age: 89
Discharge: HOME | End: 2024-04-02
Attending: INTERNAL MEDICINE
Payer: MEDICARE

## 2024-04-02 VITALS
WEIGHT: 147 LBS | BODY MASS INDEX: 26.05 KG/M2 | SYSTOLIC BLOOD PRESSURE: 130 MMHG | HEIGHT: 63 IN | DIASTOLIC BLOOD PRESSURE: 80 MMHG

## 2024-04-02 DIAGNOSIS — I10 ESSENTIAL HYPERTENSION: ICD-10-CM

## 2024-04-02 DIAGNOSIS — I47.10 SVT (SUPRAVENTRICULAR TACHYCARDIA) (CMS/HCC): ICD-10-CM

## 2024-04-02 DIAGNOSIS — I25.10 CORONARY ARTERY DISEASE INVOLVING NATIVE CORONARY ARTERY OF NATIVE HEART WITHOUT ANGINA PECTORIS: ICD-10-CM

## 2024-04-02 DIAGNOSIS — I77.810 DILATED AORTIC ROOT (CMS/HCC): ICD-10-CM

## 2024-04-02 DIAGNOSIS — E78.2 MIXED HYPERLIPIDEMIA: ICD-10-CM

## 2024-04-02 DIAGNOSIS — I34.1 MVP (MITRAL VALVE PROLAPSE): ICD-10-CM

## 2024-04-02 DIAGNOSIS — I25.10 CORONARY ARTERY DISEASE INVOLVING NATIVE CORONARY ARTERY OF NATIVE HEART WITHOUT ANGINA PECTORIS: Primary | ICD-10-CM

## 2024-04-02 LAB
AORTIC ROOT ANNULUS: 4.2 CM
AORTIC VALVE MEAN VELOCITY: 1.37 M/S
AORTIC VALVE VELOCITY TIME INTEGRAL: 45.2 CM
ASCENDING AORTA: 4.3 CM
ATRIAL RATE: 61
AV MEAN GRADIENT: 8 MMHG
AV PEAK GRADIENT: 29 MMHG
AV PEAK VELOCITY-S: 2.67 M/S
AV REG PEAK VEL: 2.9 M/S
AV REGURGITATION PRESSURE HALF TIME: 654 MS
AV VALVE AREA: 0.5 CM2
AV VELOCITY RATIO: 0.42
AVA (VTI): 0.94 CM2
CUSP SEPARATION: 1.4 CM
DOP CALC LVOT STROKE VOLUME: 42.65 CM3
E WAVE DECELERATION TIME: 294 MS
E/A RATIO: 0.9
E/E' RATIO: 11.5
E/LAT E' RATIO: 12
EDV (BP): 62.8 CM3
EF (A4C): 59.8 %
EF A2C: 66.9 %
EJECTION FRACTION: 62.9 %
EST RIGHT VENT SYSTOLIC PRESSURE BY TRICUSPID REGURGITATION JET: 47 MMHG
ESV (BP): 23.3 CM3
FRACTIONAL SHORTENING: 32.97 %
INTERVENTRICULAR SEPTUM: 1.16 CM
LA ESV (BP): 65.3 CM3
LA/AORTA RATIO: 0.81
LAAS-AP2: 22.8 CM2
LAAS-AP4: 23.9 CM2
LAD 2D: 3.4 CM
LALD A4C: 6.27 CM
LALD A4C: 6.72 CM
LAV-S: 61.8 CM3
LEFT ATRIUM VOLUME: 64.9 CM3
LEFT INTERNAL DIMENSION IN SYSTOLE: 2.44 CM
LEFT VENTRICLE DIASTOLIC VOLUME: 74.3 CM3
LEFT VENTRICLE SYSTOLIC VOLUME: 29.8 CM3
LEFT VENTRICULAR INTERNAL DIMENSION IN DIASTOLE: 3.64 CM
LEFT VENTRICULAR POSTERIOR WALL IN END DIASTOLE: 1.26 CM
LV DIASTOLIC VOLUME: 53.1 CM3
LV ESV (APICAL 2 CHAMBER): 17.6 CM3
LVAD-AP2: 20.9 CM2
LVAD-AP4: 24.5 CM2
LVAS-AP2: 10.3 CM2
LVAS-AP4: 13.6 CM2
LVLD-AP2: 6.75 CM
LVLD-AP4: 6.78 CM
LVLS-AP2: 5.1 CM
LVLS-AP4: 5.28 CM
LVOT 2D: 1.7 CM
LVOT A: 2.27 CM2
LVOT MG: 1 MMHG
LVOT MV: 0.54 M/S
LVOT PEAK VELOCITY: 0.75 M/S
LVOT PG: 2 MMHG
LVOT VTI: 18.8 CM
MITRAL VALVE MEAN INFLOW VELOCITY: 4.36 M/S
MR VELOCITY: 5.68 M/S
MR VTI: 209 CM
MV E'TISSUE VEL-LAT: 0.08 M/S
MV E'TISSUE VEL-MED: 0.08 M/S
MV MEAN GRADIENT: 3 MMHG
MV PEAK A VEL: 1.05 M/S
MV PEAK E VEL: 0.96 M/S
MV PEAK GRADIENT: 6 MMHG
MV STENOSIS PRESSURE HALF TIME: 87 MS
MV VALVE AREA BY CONTINUITY EQUATION: 1.03 CM2
MV VALVE AREA P 1/2 METHOD: 2.53 CM2
MV VTI: 41.6 CM
P AXIS: 112
POSTERIOR WALL: 1.26 CM
PR INTERVAL: 184
PV PEAK GRADIENT: 3 MMHG
PV PV: 0.9 M/S
QRS DURATION: 144
QT INTERVAL: 486
QTC CALCULATION(BAZETT): 489
R AXIS: 34
RAP: 15 MMHG
RVOT VMAX: 0.54 M/S
SEPTAL TISSUE DOPPLER FREE WALL LATE DIA VELOCITY (APICAL 4 CHAMBER VIEW): 0.13 M/S
T WAVE AXIS: 3
TR MAX PG: 31.58 MMHG
TRICUSPID VALVE PEAK REGURGITATION VELOCITY: 2.81 M/S
VENTRICULAR RATE: 61

## 2024-04-02 PROCEDURE — 99214 OFFICE O/P EST MOD 30 MIN: CPT | Performed by: INTERNAL MEDICINE

## 2024-04-02 PROCEDURE — 93306 TTE W/DOPPLER COMPLETE: CPT | Performed by: INTERNAL MEDICINE

## 2024-04-02 PROCEDURE — 93000 ELECTROCARDIOGRAM COMPLETE: CPT | Performed by: INTERNAL MEDICINE

## 2024-04-02 RX ORDER — TRIAMCINOLONE ACETONIDE 1 MG/G
CREAM TOPICAL
COMMUNITY
Start: 2024-02-21

## 2024-04-02 NOTE — ASSESSMENT & PLAN NOTE
Moderate mitral regurgitation with moderate pulmonary hypertension echo April 2024 declines diuretic therapy

## 2024-04-02 NOTE — ASSESSMENT & PLAN NOTE
Echocardiogram today April 2024 stable 4.2 cm preserved ejection fraction mild aortic stenosis new from previous year mean gradient of 8 preserved ejection fraction MVP with moderate mitral regurgitation pulmonary artery pressure elevated at 47 stable findings

## 2024-05-16 NOTE — ASSESSMENT & PLAN NOTE
Ablation 2006 no clinical recurrence   EKG:   Sinus Rhythm   rate 67  -RSR(V1) -nondiagnostic.  PROBABLY NORMAL     Treatment regimen is effective.       Lab Results   Component Value Date/Time     05/09/2024 08:15 AM    K 4.5 05/09/2024 08:15 AM     05/09/2024 08:15 AM    CO2 22 05/09/2024 08:15 AM    BUN 25 05/09/2024 08:15 AM    CREATININE 0.86 05/09/2024 08:15 AM    GLUCOSE 117 05/09/2024 08:15 AM    CALCIUM 9.7 05/09/2024 08:15 AM    LABGLOM 74 05/09/2024 08:15 AM    LABGLOM 55 11/09/2023 08:19 AM    LABGLOM 77 04/21/2022 11:22 AM      Hypertension Medications       Antihypertensive Combinations       benazepril-hydrochlorthiazide (LOTENSIN HCT) 20-12.5 MG per tablet Take 1 tablet by mouth daily

## 2025-04-07 ENCOUNTER — OFFICE VISIT (OUTPATIENT)
Dept: CARDIOLOGY | Facility: CLINIC | Age: 89
End: 2025-04-07
Payer: MEDICARE

## 2025-04-07 ENCOUNTER — HOSPITAL ENCOUNTER (OUTPATIENT)
Dept: CARDIOLOGY | Facility: CLINIC | Age: 89
Discharge: HOME | End: 2025-04-07
Attending: INTERNAL MEDICINE
Payer: MEDICARE

## 2025-04-07 VITALS
WEIGHT: 152 LBS | DIASTOLIC BLOOD PRESSURE: 65 MMHG | SYSTOLIC BLOOD PRESSURE: 120 MMHG | HEIGHT: 63 IN | BODY MASS INDEX: 26.93 KG/M2

## 2025-04-07 VITALS
SYSTOLIC BLOOD PRESSURE: 114 MMHG | WEIGHT: 152 LBS | HEART RATE: 73 BPM | DIASTOLIC BLOOD PRESSURE: 70 MMHG | BODY MASS INDEX: 26.93 KG/M2 | OXYGEN SATURATION: 93 % | HEIGHT: 63 IN

## 2025-04-07 DIAGNOSIS — I34.1 MVP (MITRAL VALVE PROLAPSE): ICD-10-CM

## 2025-04-07 DIAGNOSIS — I77.810 DILATED AORTIC ROOT (CMS/HCC): ICD-10-CM

## 2025-04-07 DIAGNOSIS — I47.10 SVT (SUPRAVENTRICULAR TACHYCARDIA) (CMS/HCC): ICD-10-CM

## 2025-04-07 DIAGNOSIS — I25.10 CORONARY ARTERY DISEASE INVOLVING NATIVE CORONARY ARTERY OF NATIVE HEART WITHOUT ANGINA PECTORIS: Primary | ICD-10-CM

## 2025-04-07 LAB
AORTIC ROOT ANNULUS: 4.2 CM
AORTIC VALVE MEAN VELOCITY: 1.6 M/S
AORTIC VALVE VELOCITY TIME INTEGRAL: 53.9 CM
ASCENDING AORTA: 4.2 CM
ATRIAL RATE: 67
AV MEAN GRADIENT: 11 MMHG
AV PEAK GRADIENT: 21 MMHG
AV PEAK VELOCITY-S: 2.28 M/S
AV VALVE AREA INDEX: 1.01
AV VALVE AREA: 1.42 CM2
AV VELOCITY RATIO: 0.56
AVA (VTI): 1.77 CM2
BSA FOR ECHO PROCEDURE: 1.75 M2
DOP CALC LVOT STROKE VOLUME: 95.14 ML
E WAVE DECELERATION TIME: 316 MS
E/A RATIO: 1.1
E/E' RATIO: 20.5
E/LAT E' RATIO: 20.2
EDV (BP): 73.7 CM3
EF (A4C): 69.8 %
EF A2C: 73.1 %
EJECTION FRACTION: 71.2 %
ESV (BP): 21.2 CM3
FRACTIONAL SHORTENING: 29.11 %
INTERVENTRICULAR SEPTUM: 1.41 CM
LA ESV (BP): 169 CM3
LA ESV INDEX (A2C): 90.86 CM3/M2
LA ESV INDEX (BP): 96.57 CM3/M2
LA/AORTA RATIO: 1.05
LAAS-AP2: 39.9 CM2
LAAS-AP4: 35.2 CM2
LAD 2D: 4.4 CM
LAL MED-LAT (A4C): 6.63 CM
LAV-S: 159 CM3
LEFT ATRIAL LENGTH SUPERIOR-INFERIOR (APICAL 2-CHAMBER VIEW): 8 CM
LEFT ATRIUM VOLUME INDEX: 85.14 CM3/M2
LEFT ATRIUM VOLUME: 149 CM3
LEFT INTERNAL DIMENSION IN SYSTOLE: 3.19 CM (ref 2.4–3.63)
LEFT VENTRICLE DIASTOLIC VOLUME INDEX: 42 CM3/M2
LEFT VENTRICLE DIASTOLIC VOLUME: 73.5 CM3
LEFT VENTRICLE SYSTOLIC VOLUME INDEX: 12.69 CM3/M2
LEFT VENTRICLE SYSTOLIC VOLUME: 22.2 CM3
LEFT VENTRICULAR INTERNAL DIMENSION IN DIASTOLE: 4.5 CM (ref 4.05–5.62)
LEFT VENTRICULAR POSTERIOR WALL IN END DIASTOLE: 1.45 CM (ref 0.53–0.98)
LV DIASTOLIC VOLUME: 65.7 CM3
LV ESV (APICAL 2 CHAMBER): 17.6 CM3
LVAD-AP2: 24.9 CM2
LVAD-AP4: 24.2 CM2
LVAS-AP2: 11 CM2
LVAS-AP4: 11.6 CM2
LVCI: 1.9 L/MIN/M2
LVCO: 3.3 L/MIN
LVEDVI(A2C): 37.54 CM3/M2
LVEDVI(BP): 42.11 CM3/M2
LVESVI(A2C): 10.06 CM3/M2
LVESVI(BP): 12.11 CM3/M2
LVLD-AP2: 7.5 CM
LVLD-AP4: 6.61 CM
LVLS-AP2: 5.77 CM
LVLS-AP4: 5 CM
LVOT 2D: 2 CM
LVOT A: 3.14 CM2
LVOT MG: 4 MMHG
LVOT MV: 0.95 M/S
LVOT PEAK VELOCITY: 1.31 M/S
LVOT PG: 7 MMHG
LVOT STROKE VOLUME INDEX: 54.37 ML/M2
LVOT VTI: 30.3 CM
MLH CV ECHO AVA INDEX VELOCITY RATIO: 0.8
MV E'TISSUE VEL-LAT: 0.06 M/S
MV E'TISSUE VEL-MED: 0.06 M/S
MV PEAK A VEL: 1.08 M/S
MV PEAK E VEL: 1.21 M/S
MV STENOSIS PRESSURE HALF TIME: 93 MS
MV VALVE AREA BY CONTINUITY EQUATION: 2 CM2
MV VALVE AREA P 1/2 METHOD: 2.37 CM2
MV VTI: 47.6 CM
P AXIS: 88
POSTERIOR WALL: 1.45 CM
PR INTERVAL: 190
PULMONARY REGURGITATION LATE DIASTOLIC VELOCITY: 0.98 M/S
PV PEAK GRADIENT: 3 MMHG
PV PV: 0.93 M/S
QRS DURATION: 138
QT INTERVAL: 466
QTC CALCULATION(BAZETT): 492
R AXIS: 76
RVOT VMAX: 0.82 M/S
RVOT VTI: 18.2 CM
SEPTAL TISSUE DOPPLER FREE WALL LATE DIA VELOCITY (APICAL 4 CHAMBER VIEW): 0.1 M/S
T WAVE AXIS: 19
TR MAX PG: 36.72 MMHG
TRICUSPID VALVE PEAK REGURGITATION VELOCITY: 3.03 M/S
VENTRICULAR RATE: 67
Z-SCORE OF LEFT VENTRICULAR DIMENSION IN END DIASTOLE: -0.58
Z-SCORE OF LEFT VENTRICULAR DIMENSION IN END SYSTOLE: 0.61
Z-SCORE OF LEFT VENTRICULAR POSTERIOR WALL IN END DIASTOLE: 3.63

## 2025-04-07 PROCEDURE — 93306 TTE W/DOPPLER COMPLETE: CPT | Performed by: STUDENT IN AN ORGANIZED HEALTH CARE EDUCATION/TRAINING PROGRAM

## 2025-04-07 PROCEDURE — 99214 OFFICE O/P EST MOD 30 MIN: CPT | Performed by: STUDENT IN AN ORGANIZED HEALTH CARE EDUCATION/TRAINING PROGRAM

## 2025-04-07 PROCEDURE — G2211 COMPLEX E/M VISIT ADD ON: HCPCS | Performed by: STUDENT IN AN ORGANIZED HEALTH CARE EDUCATION/TRAINING PROGRAM

## 2025-04-07 PROCEDURE — 93000 ELECTROCARDIOGRAM COMPLETE: CPT | Performed by: STUDENT IN AN ORGANIZED HEALTH CARE EDUCATION/TRAINING PROGRAM

## 2025-04-07 ASSESSMENT — ENCOUNTER SYMPTOMS
HEMATURIA: 0
FATIGUE: 0
STRIDOR: 0
PALPITATIONS: 0
COLOR CHANGE: 0
SHORTNESS OF BREATH: 0
LIGHT-HEADEDNESS: 0
DIAPHORESIS: 0

## 2025-04-07 NOTE — PROGRESS NOTES
Robinson Nagel MD, [facility]  Non-invasive Cardiology    [facility]  [facility] [place]  [address] [place] [address]     [facility]  [address]  [address], [address]  [place] [address]    [phone]    [facility]       2025        Patient Name: Kathy Clayton  Account:          [id number]  :               1933        Dear Arpan Casillas, DO:     I had the pleasure of seeing your patient, Kathy Clayton, on 2025. As you know, she is a 89 y.o. female with medical history as described below.     HPI  89 y.o. 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 At today's visit the patient reports feeling well.  she denies having any chest pains, shortness of breath, or other anginal equivalents. she also denies having any palpitations or fast irregular heartbeats nor any episodes of presyncope/syncope.  she remains compliant with all her medications and has no major complaints for today.       CARDIOVASCULAR STUDIES:     Lab Results   Component Value Date    CHOL 209 (H) 2023    TRIG 122 2023    HDL 61 2023    LDLCALC 124 (H) 2023    NONHDLCALC 148 2023       ECG: Independently reviewed:      ECHOCARDIOGRAM:    Results for orders placed during the hospital encounter of 25    Transthoracic echo (TTE) complete    Interpretation Summary    Left Ventricle: Normal ventricle size. Moderate concentric left ventricular hypertrophy. Estimated EF 60-65%. Wall motion appears grossly normal. Indeterminate diastolic filling pattern.    Right Ventricle: Normal ventricle size. Normal systolic function.    Left Atrium: Severely dilated atrium.    Right Atrium: Moderately dilated atrium.    Aortic Valve: Tricuspid valve.  Calcification present with reduced excursion. Mild  regurgitation. Mild stenosis. The peak aortic velocity was measured in the apical view. Peak velocity = 2.28 m/s. Mean gradient = 11.00 mmHg. Calculated area by cont eq = 1.77 cm2. Calculated dimensionless index = 0.56.    Mitral Valve: Grossly normal leaflet structure. Severe mitral annular calcification. Mild to moderate regurgitation. Mild stenosis.    Tricuspid Valve: Structure is grossly normal. Mild to moderate regurgitation.    Pulmonic Valve: Grossly normal structure. Trace regurgitation.    Pericardium: Pericardium not well visualized.    IVC/SVC: Inferior vena cava not well visualized.    Aorta: Dilatation of the aortic root. Dilatation of the ascending aorta.    Dilated aortic root measured approximately 4.2 cm at the sinuses of Valsalva and 4.2 cm at the ascending aorta.    No significant change compared to prior study from 4/2/2024.        Results for orders placed during the hospital encounter of 04/02/24    Transthoracic echo (TTE) complete    Interpretation Summary    Left Ventricle: Normal ventricle size. Mild concentric left ventricular hypertrophy. Preserved systolic function. Estimated EF 60-65%. Wall motion appears grossly normal. Normal septal motion. Indeterminate diastolic filling pattern.    Aorta: Mild dilatation of the aortic root at 4.20cm. Mild dilatation of the ascending aorta at 4.30cm.  No significant change from study of 2018    Right Ventricle: Normal ventricle size. Normal systolic function.    Left Atrium: Moderately dilated atrium.    Right Atrium: Mildly dilated atrium.    Aortic Valve: Tricuspid valve.  Calcification present. Mild regurgitation. Mild stenosis. Peak velocity = 2.67 m/s. Mean gradient = 8.00 mmHg. Calculated area by cont eq = 0.94 cm2. Calculated dimensionless index = 0.42.    Mitral Valve: Moderate calcification of the posterior leaflet. Moderate mitral annular calcification. Mild to moderate regurgitation. Mild stenosis. Mean gradient = 3.00.    Tricuspid  Valve: Structure is grossly normal. Moderate regurgitation. Estimated RVSP = 47 mmHg.    Pulmonic Valve: Grossly normal structure. Mild regurgitation.    IVC/SVC: Inferior vena cava is dilated (>2.1cm). Inferior vena cava collapses <50% during inspiration.    Pericardium: No evidence of pericardial effusion.    Compared to study of 2023 patient has developed mild aortic stenosis      STRESS TESTS:     Results for orders placed in visit on 10/09/17    ECHOCARDIOGRAM STRESS TEST    Narrative  Ordered by an unspecified provider.      Cor cta 6/2021  Mild CAD [facility] aortic root 4.1 cm mac  CORONARY ANGIOGRAPHY:   RIGHT CORONARY ARTERY: Scattered calcified plaque with no significant stenosis. The conus branch arises separately from the right sinus of Valsalva, an anatomic variant.   The LAD and circumflex individually originate off the left sinus of Valsalva, an anatomic variant.   LEFT ANTERIOR DESCENDING: Scattered mixed calcified and noncalcified plaque with no significant stenosis.   LEFT CIRCUMFLEX: Scattered calcified plaque or significant stenosis. The SA gonzález artery arises from the circumflex coronary artery.     The coronary arteries are codominant.     FUNCTIONAL ANALYSIS   Functional analysis was not performed as this study was acquired using prospective triggering to reduce radiation dose.     ADDITIONAL FINDINGS:   *  Mild enlargement of the aortic root to 4.1 cm.   *  Dilated right atrium. Severe mitral annular calcifications. Calcifications of the aortic valve, root and descending thoracic aorta.   *  Bibasilar subsegmental atelectasis. Interval decrease in clustered micronodules in the anterior right upper lobe, in keeping with sequela from bronchiolitis (series 301, images 6-7).     CAROTID ULTRASOUND:  Results for orders placed during the hospital encounter of 06/27/23    Ultrasound carotid bilateral    Interpretation Summary  Procedure:  The extracranial cervical portions of the  carotid and vertebral arteries were analyzed utilizing B mode, imaging with grey scale, color flow and spectral analysis of the doppler pulse curves.    Right  The extracranial carotid vessels are defined. There is no obstruction on transverse imaging. Non elevated flow velocities are seen in the common and internal carotid arteries.  Vertebral artery flow is antegrade.  Left  The extracranial carotid vessels are defined. There is no obstruction on transverse imaging. Non elevated flow velocities are seen in the common and internal carotid arteries.  Vertebral artery flow is antegrade.    Impression:  No hemodynamically significant stenosis identified in the visualized carotid circulation.            Past Medical History:   Diagnosis Date    Abnormal ECG     Coronary artery disease     COVID-19 December / January    Dilated aortic root (CMS/HCC)     Hypertension     Lipid disorder     Mitral regurgitation     Pulmonary nodules 2012        Past Surgical History   Procedure Laterality Date    Cardiac electrophysiology mapping and ablation      Cataract extraction, bilateral      Hysterectomy          Family History   Problem Relation Name Age of Onset    Hyperlipidemia Biological Mother      Diabetes Biological Father      Hyperlipidemia Biological Father      Percival's disease Biological Father      Percival's disease Biological Sister      Multiple sclerosis Biological Brother      Heart disease Father's Brother      Stroke Father's Brother      Stroke Mother's Sister      Cystic fibrosis Niece          Social History     Socioeconomic History    Marital status:      Spouse name: None    Number of children: None    Years of education: None    Highest education level: None   Tobacco Use    Smoking status: Never     Passive exposure: Never    Smokeless tobacco: Never   Vaping Use    Vaping status: Never Used   Substance and Sexual Activity    Alcohol use: No    Drug use: Defer    Sexual activity: Defer    Social History Narrative    Retired -  in [employer]    lives with daughter     Social Drivers of Health     Financial Resource Strain: Low Risk  (6/28/2023)    Overall Financial Resource Strain (CARDIA)     Difficulty of Paying Living Expenses: Not hard at all   Food Insecurity: No Food Insecurity (6/27/2023)    Hunger Vital Sign     Worried About Running Out of Food in the Last Year: Never true     Ran Out of Food in the Last Year: Never true   Transportation Needs: No Transportation Needs (6/28/2023)    PRAPARE - Transportation     Lack of Transportation (Medical): No     Lack of Transportation (Non-Medical): No   Housing Stability: Low Risk  (6/28/2023)    Housing Stability Vital Sign     Unable to Pay for Housing in the Last Year: No     Number of Places Lived in the Last Year: 1     Unstable Housing in the Last Year: No        Current Outpatient Medications   Medication Sig Dispense Refill    acetaminophen (TYLENOL EXTRA STRENGTH ORAL) Take 500 mg by mouth daily.      aspirin 81 mg chewable tablet Take 1 tablet (81 mg total) by mouth daily. 30 tablet 0    carboxymethylcellulose (REFRESH PLUS) 0.5 % dropperette Administer 1 drop into both eyes 3 (three) times a day as needed for dry eyes.      cholecalciferol, vitamin D3, 1,000 unit (25 mcg) tablet Take 1 tablet by mouth daily.      clobetasoL (TEMOVATE) 0.05 % ointment Apply 1 application topically 2 (two) times a week (Mon, Thu).      CYANOCOBALAMIN/FOLIC ACID (VITAMIN B12-FOLIC ACID) 500-400 mcg tablet Take 1 tablet by mouth daily.      hydrocortisone 2.5 % cream APPLY A THIN LAYER TWICE A DAY TO RASH ON FACE UNTIL CLEAR THEN USE AS NEEDED FOR FLARE      lansoprazole (PREVACID SOLUTAB) 30 mg disintegrating tablet Take 30 mg by mouth daily.      LORazepam (ATIVAN) 0.5 mg tablet Take 0.5 mg by mouth nightly as needed for anxiety.      magnesium hydroxide (MILK OF MAGNESIA CONCENTRATED ORAL) Take by mouth once.      multivit-min-FA-lycopen-lutein  "tablet Take 1 tablet by mouth daily.      potassium gluconate 595 mg (99 mg) tablet Take 1 tablet by mouth daily.      simethicone (MYLICON,GAS-X) 125 mg capsule Take 125 mg by mouth daily.      thiamine 50 mg tablet Take 50 mg by mouth daily.      triamcinolone (KENALOG) 0.1 % cream APPLY A THIN LAYER TO AFFECTED AREA S) OF BACK ONCE DAILY      diclofenac sodium 1.5 % drops Apply topically.      flaxseed oil 1,000 mg capsule Take 1 capsule by mouth as needed.        folic acid (FOLVITE) 400 mcg tablet Take 1 tablet by mouth daily. (Patient not taking: Reported on 4/7/2025)      Lactobac no.41-Bifidobact no.7 70 mg (3 billion cell) capsule Take 1 capsule by mouth daily. (Patient not taking: Reported on 4/7/2025)      meclizine (ANTIVERT) 25 mg tablet Take 1 tablet (25 mg total) by mouth 3 (three) times a day as needed for dizziness. 15 tablet 0     No current facility-administered medications for this visit.        Allergies:  Adenosine, Anesthesia s/i-40  [propofol], Cephalosporins, Desvenlafaxine succinate, Dexbrompheniramine, Diltiazem, Escitalopram oxalate, Ezetimibe, Fluoxetine hcl, Lactase, Metoprolol, Mirtazapine, Penicillins, Plant stanol leonila, Sertraline hcl, Statins-hmg-coa reductase inhibitors, and Sulfa (sulfonamide antibiotics)       Review of Systems   Constitutional:  Negative for diaphoresis and fatigue.   HENT:  Negative for nosebleeds.    Respiratory:  Negative for shortness of breath and stridor.    Cardiovascular:  Negative for chest pain and palpitations.   Genitourinary:  Negative for hematuria.   Skin:  Negative for color change.   Neurological:  Negative for syncope and light-headedness.        Vitals:    04/07/25 1315   BP: 114/70   BP Location: Left upper arm   Patient Position: Sitting   Pulse: 73   SpO2: 93%   Weight: 68.9 kg (152 lb)   Height: 1.6 m (5' 3\")     Body mass index is 26.93 kg/m².     Physical Exam  Constitutional:       General: She is not in acute distress.     Appearance: " Normal appearance. She is not ill-appearing.   HENT:      Head: Normocephalic and atraumatic.      Nose: Nose normal.   Eyes:      General:         Right eye: No discharge.         Left eye: No discharge.      Conjunctiva/sclera: Conjunctivae normal.   Neck:      Vascular: No carotid bruit.   Cardiovascular:      Rate and Rhythm: Normal rate and regular rhythm.      Heart sounds: No murmur heard.     No friction rub. No gallop.   Pulmonary:      Effort: No respiratory distress.      Breath sounds: No stridor. No wheezing, rhonchi or rales.   Musculoskeletal:      Right lower leg: No edema.      Left lower leg: No edema.   Skin:     General: Skin is warm and dry.   Neurological:      Mental Status: She is alert.   Psychiatric:         Mood and Affect: Mood normal.         Behavior: Behavior normal.             Diagnosis Plan   1. Coronary artery disease involving native coronary artery of native heart without angina pectoris  [facility] MUSE ECG 12 lead (clinic performed)      2. MVP (mitral valve prolapse)  [facility] MUSE ECG 12 lead (clinic performed)      3. Dilated aortic root (CMS/HCC)  [facility] MUSE ECG 12 lead (clinic performed)      4. SVT (supraventricular tachycardia) (CMS/HCC)  [facility] MUSE ECG 12 lead (clinic performed)      I attest that this visit supports the complexity inherent to evaluation and management associated with medical care services that serve as the continuing focal point for all needed health care services and/or medical care services that are part of ongoing care related to this patient's single, serious condition or a complex condition.      ASSESSMENT AND PLAN:     89 y.o. 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     # Nonobstructive CAD (mild)  Stable, free of anginal symptomatology.  Continue aspirin 81 mg daily.  History of statin intolerance.  Patient not  interested in injectable medications.     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  No signs or symptoms concerning for volume overload.     # Dilated aorta  SoV 4.2cm and AA 4.2cm  Stable on echocardiogram from today.  Blood pressure is well-controlled.     #SVT s/p ablation (2006)  No recurrent symptoms    #HLD  Intolerance to multiple statins.  Patient not interested in injectable medications.          Return in about 6 months (around 10/7/2025).     I thank you for the opportunity to take part in the care of Kathy Clayton.  Please do not hesitate to contact me with any questions or concerns.     Sincerely,  Robinson Nagel MD  Cardiovascular Disease  4/7/2025      This note was generated using speech recognition software. Please excuse any typographical errors. Please contact me with any questions or concerns.

## 2025-04-16 ENCOUNTER — TELEPHONE (OUTPATIENT)
Dept: CARDIOLOGY | Facility: CLINIC | Age: 89
End: 2025-04-16
Payer: MEDICARE

## 2025-07-09 ENCOUNTER — APPOINTMENT (OUTPATIENT)
Dept: URBAN - METROPOLITAN AREA CLINIC 374 | Facility: CLINIC | Age: OVER 89
Setting detail: DERMATOLOGY
End: 2025-07-09

## 2025-07-09 DIAGNOSIS — L27.0 GENERALIZED SKIN ERUPTION DUE TO DRUGS AND MEDICAMENTS TAKEN INTERNALLY: ICD-10-CM | Status: RESOLVING

## 2025-07-09 PROCEDURE — ? PRESCRIPTION MEDICATION MANAGEMENT

## 2025-07-09 PROCEDURE — ? COUNSELING

## 2025-07-09 ASSESSMENT — LOCATION DETAILED DESCRIPTION DERM
LOCATION DETAILED: RIGHT ANTERIOR PROXIMAL THIGH
LOCATION DETAILED: LEFT INFERIOR ANTERIOR NECK
LOCATION DETAILED: RIGHT PROXIMAL PRETIBIAL REGION
LOCATION DETAILED: RIGHT LATERAL SUPERIOR CHEST
LOCATION DETAILED: INFERIOR THORACIC SPINE
LOCATION DETAILED: LEFT ANTERIOR PROXIMAL UPPER ARM
LOCATION DETAILED: PERIUMBILICAL SKIN
LOCATION DETAILED: LEFT LATERAL SUPERIOR CHEST
LOCATION DETAILED: RIGHT ANTERIOR DISTAL UPPER ARM
LOCATION DETAILED: LEFT PROXIMAL PRETIBIAL REGION
LOCATION DETAILED: LEFT ANTERIOR PROXIMAL THIGH

## 2025-07-09 ASSESSMENT — LOCATION SIMPLE DESCRIPTION DERM
LOCATION SIMPLE: LEFT THIGH
LOCATION SIMPLE: LEFT ANTERIOR NECK
LOCATION SIMPLE: LEFT PRETIBIAL REGION
LOCATION SIMPLE: UPPER BACK
LOCATION SIMPLE: CHEST
LOCATION SIMPLE: RIGHT UPPER ARM
LOCATION SIMPLE: LEFT UPPER ARM
LOCATION SIMPLE: RIGHT THIGH
LOCATION SIMPLE: ABDOMEN
LOCATION SIMPLE: RIGHT PRETIBIAL REGION

## 2025-07-09 ASSESSMENT — LOCATION ZONE DERM
LOCATION ZONE: ARM
LOCATION ZONE: LEG
LOCATION ZONE: TRUNK
LOCATION ZONE: NECK

## 2025-07-09 NOTE — HPI: RASH
What Type Of Note Output Would You Prefer (Optional)?: Bullet Format
Is The Patient Presenting As Previously Scheduled?: Yes
Is This A New Presentation, Or A Follow-Up?: Rash
Additional History: Patient started taking furosemide 20 mg tablets May 24th, then 2 weeks later the rash developed. PCP prescribed Betamethasone dipropionate ointment and was recommended OTC CeraVe anti itch. Patient states itchiness has resolved but there are residual redness and spots.

## 2025-07-09 NOTE — PROCEDURE: PRESCRIPTION MEDICATION MANAGEMENT
Render In Strict Bullet Format?: No
Continue Regimen: Betamethasone dipropionate (rx be PCP): apply to affected areas of neck, arms, chest, back, and legs PRN
Detail Level: Zone